# Patient Record
Sex: FEMALE | Race: WHITE | Employment: OTHER | URBAN - METROPOLITAN AREA
[De-identification: names, ages, dates, MRNs, and addresses within clinical notes are randomized per-mention and may not be internally consistent; named-entity substitution may affect disease eponyms.]

---

## 2017-03-28 ENCOUNTER — HOSPITAL ENCOUNTER (OUTPATIENT)
Dept: MAMMOGRAPHY | Age: 72
Discharge: HOME | End: 2017-03-28
Attending: FAMILY MEDICINE
Payer: MEDICARE

## 2017-03-28 DIAGNOSIS — Z12.31 VISIT FOR SCREENING MAMMOGRAM: ICD-10-CM

## 2017-03-28 PROCEDURE — 77067 SCR MAMMO BI INCL CAD: CPT

## 2017-08-24 ENCOUNTER — APPOINTMENT (RX ONLY)
Dept: URBAN - METROPOLITAN AREA CLINIC 349 | Facility: CLINIC | Age: 72
Setting detail: DERMATOLOGY
End: 2017-08-24

## 2017-08-24 DIAGNOSIS — Z85.828 PERSONAL HISTORY OF OTHER MALIGNANT NEOPLASM OF SKIN: ICD-10-CM

## 2017-08-24 DIAGNOSIS — D22 MELANOCYTIC NEVI: ICD-10-CM

## 2017-08-24 DIAGNOSIS — L21.8 OTHER SEBORRHEIC DERMATITIS: ICD-10-CM | Status: INADEQUATELY CONTROLLED

## 2017-08-24 DIAGNOSIS — L82.1 OTHER SEBORRHEIC KERATOSIS: ICD-10-CM

## 2017-08-24 PROBLEM — I10 ESSENTIAL (PRIMARY) HYPERTENSION: Status: ACTIVE | Noted: 2017-08-24

## 2017-08-24 PROBLEM — D22.5 MELANOCYTIC NEVI OF TRUNK: Status: ACTIVE | Noted: 2017-08-24

## 2017-08-24 PROBLEM — L20.84 INTRINSIC (ALLERGIC) ECZEMA: Status: ACTIVE | Noted: 2017-08-24

## 2017-08-24 PROCEDURE — ? COUNSELING

## 2017-08-24 PROCEDURE — ? PRESCRIPTION

## 2017-08-24 PROCEDURE — 99213 OFFICE O/P EST LOW 20 MIN: CPT

## 2017-08-24 RX ORDER — CLOBETASOL PROPIONATE 0.46 MG/ML
SOLUTION TOPICAL
Qty: 1 | Refills: 4 | Status: ERX | COMMUNITY
Start: 2017-08-24

## 2017-08-24 RX ADMIN — CLOBETASOL PROPIONATE: 0.46 SOLUTION TOPICAL at 14:13

## 2017-08-24 ASSESSMENT — LOCATION DETAILED DESCRIPTION DERM
LOCATION DETAILED: LEFT PROXIMAL RADIAL DORSAL FOREARM
LOCATION DETAILED: SUPERIOR THORACIC SPINE
LOCATION DETAILED: LEFT DISTAL CALF
LOCATION DETAILED: RIGHT INFERIOR MEDIAL UPPER BACK
LOCATION DETAILED: RIGHT PROXIMAL DORSAL FOREARM
LOCATION DETAILED: INFERIOR THORACIC SPINE
LOCATION DETAILED: STERNAL NOTCH
LOCATION DETAILED: RIGHT MEDIAL BREAST 3-4:00 REGION
LOCATION DETAILED: RIGHT PROXIMAL CALF
LOCATION DETAILED: LEFT SUPERIOR PARIETAL SCALP
LOCATION DETAILED: MIDDLE STERNUM

## 2017-08-24 ASSESSMENT — LOCATION SIMPLE DESCRIPTION DERM
LOCATION SIMPLE: RIGHT BREAST
LOCATION SIMPLE: RIGHT CALF
LOCATION SIMPLE: UPPER BACK
LOCATION SIMPLE: SCALP
LOCATION SIMPLE: RIGHT FOREARM
LOCATION SIMPLE: RIGHT UPPER BACK
LOCATION SIMPLE: LEFT FOREARM
LOCATION SIMPLE: CHEST
LOCATION SIMPLE: LEFT CALF

## 2017-08-24 ASSESSMENT — LOCATION ZONE DERM
LOCATION ZONE: TRUNK
LOCATION ZONE: LEG
LOCATION ZONE: ARM
LOCATION ZONE: SCALP

## 2018-04-16 PROBLEM — E66.01 OBESITY, MORBID (HCC): Status: ACTIVE | Noted: 2018-04-16

## 2018-05-03 ENCOUNTER — HOSPITAL ENCOUNTER (OUTPATIENT)
Dept: MAMMOGRAPHY | Age: 73
Discharge: HOME | End: 2018-05-03
Attending: FAMILY MEDICINE
Payer: MEDICARE

## 2018-05-03 DIAGNOSIS — Z12.31 VISIT FOR SCREENING MAMMOGRAM: ICD-10-CM

## 2018-05-03 PROCEDURE — 77063 BREAST TOMOSYNTHESIS BI: CPT

## 2019-02-19 ENCOUNTER — HOSPITAL ENCOUNTER (OUTPATIENT)
Dept: SURGERY | Age: 74
Discharge: HOME | End: 2019-02-19
Payer: MEDICARE

## 2019-02-19 ENCOUNTER — HOSPITAL ENCOUNTER (OUTPATIENT)
Dept: PHYSICAL THERAPY | Age: 74
Discharge: HOME | End: 2019-02-19
Payer: MEDICARE

## 2019-02-19 VITALS — OXYGEN SATURATION: 97 %

## 2019-02-19 DIAGNOSIS — R06.83 SNORING: Primary | ICD-10-CM

## 2019-02-19 LAB
ANION GAP SERPL CALC-SCNC: 6 MMOL/L
APPEARANCE UR: CLEAR
APTT PPP: 26.7 SEC (ref 24.7–39.8)
ATRIAL RATE: 64 BPM
BACTERIA SPEC CULT: NORMAL
BACTERIA URNS QL MICRO: 0 /HPF
BASOPHILS # BLD: 0.1 K/UL (ref 0–0.2)
BASOPHILS NFR BLD: 1 % (ref 0–2)
BILIRUB UR QL: ABNORMAL
BUN SERPL-MCNC: 22 MG/DL (ref 8–23)
CALCIUM SERPL-MCNC: 9.3 MG/DL (ref 8.3–10.4)
CALCULATED P AXIS, ECG09: 32 DEGREES
CALCULATED R AXIS, ECG10: 2 DEGREES
CALCULATED T AXIS, ECG11: 23 DEGREES
CASTS URNS QL MICRO: ABNORMAL /LPF
CHLORIDE SERPL-SCNC: 104 MMOL/L (ref 98–107)
CO2 SERPL-SCNC: 33 MMOL/L (ref 21–32)
COLOR UR: YELLOW
CREAT SERPL-MCNC: 1.18 MG/DL (ref 0.6–1)
DIAGNOSIS, 93000: NORMAL
DIFFERENTIAL METHOD BLD: NORMAL
EOSINOPHIL # BLD: 0.2 K/UL (ref 0–0.8)
EOSINOPHIL NFR BLD: 2 % (ref 0.5–7.8)
EPI CELLS #/AREA URNS HPF: ABNORMAL /HPF
ERYTHROCYTE [DISTWIDTH] IN BLOOD BY AUTOMATED COUNT: 13.5 % (ref 11.9–14.6)
GLUCOSE SERPL-MCNC: 119 MG/DL (ref 65–100)
GLUCOSE UR STRIP.AUTO-MCNC: NEGATIVE MG/DL
HCT VFR BLD AUTO: 41.6 % (ref 35.8–46.3)
HGB BLD-MCNC: 13.5 G/DL (ref 11.7–15.4)
HGB UR QL STRIP: NEGATIVE
IMM GRANULOCYTES # BLD AUTO: 0 K/UL (ref 0–0.5)
IMM GRANULOCYTES NFR BLD AUTO: 1 % (ref 0–5)
INR PPP: 1
KETONES UR QL STRIP.AUTO: NEGATIVE MG/DL
LEUKOCYTE ESTERASE UR QL STRIP.AUTO: ABNORMAL
LYMPHOCYTES # BLD: 2.1 K/UL (ref 0.5–4.6)
LYMPHOCYTES NFR BLD: 34 % (ref 13–44)
MCH RBC QN AUTO: 29.3 PG (ref 26.1–32.9)
MCHC RBC AUTO-ENTMCNC: 32.5 G/DL (ref 31.4–35)
MCV RBC AUTO: 90.2 FL (ref 79.6–97.8)
MONOCYTES # BLD: 0.6 K/UL (ref 0.1–1.3)
MONOCYTES NFR BLD: 10 % (ref 4–12)
NEUTS SEG # BLD: 3.2 K/UL (ref 1.7–8.2)
NEUTS SEG NFR BLD: 52 % (ref 43–78)
NITRITE UR QL STRIP.AUTO: NEGATIVE
NRBC # BLD: 0 K/UL (ref 0–0.2)
OTHER OBSERVATIONS,UCOM: ABNORMAL
P-R INTERVAL, ECG05: 154 MS
PH UR STRIP: 6.5 [PH] (ref 5–9)
PLATELET # BLD AUTO: 398 K/UL (ref 150–450)
PMV BLD AUTO: 9.6 FL (ref 9.4–12.3)
POTASSIUM SERPL-SCNC: 4.4 MMOL/L (ref 3.5–5.1)
PROT UR STRIP-MCNC: NEGATIVE MG/DL
PROTHROMBIN TIME: 13.3 SEC (ref 11.7–14.5)
Q-T INTERVAL, ECG07: 420 MS
QRS DURATION, ECG06: 84 MS
QTC CALCULATION (BEZET), ECG08: 433 MS
RBC # BLD AUTO: 4.61 M/UL (ref 4.05–5.2)
RBC #/AREA URNS HPF: ABNORMAL /HPF
SERVICE CMNT-IMP: NORMAL
SODIUM SERPL-SCNC: 143 MMOL/L (ref 136–145)
SP GR UR REFRACTOMETRY: 1.02 (ref 1–1.02)
UROBILINOGEN UR QL STRIP.AUTO: 4 EU/DL (ref 0.2–1)
VENTRICULAR RATE, ECG03: 64 BPM
WBC # BLD AUTO: 6.2 K/UL (ref 4.3–11.1)
WBC URNS QL MICRO: ABNORMAL /HPF

## 2019-02-19 PROCEDURE — 87641 MR-STAPH DNA AMP PROBE: CPT

## 2019-02-19 PROCEDURE — 36415 COLL VENOUS BLD VENIPUNCTURE: CPT

## 2019-02-19 PROCEDURE — 85025 COMPLETE CBC W/AUTO DIFF WBC: CPT

## 2019-02-19 PROCEDURE — 80048 BASIC METABOLIC PNL TOTAL CA: CPT

## 2019-02-19 PROCEDURE — 85610 PROTHROMBIN TIME: CPT

## 2019-02-19 PROCEDURE — 81001 URINALYSIS AUTO W/SCOPE: CPT

## 2019-02-19 PROCEDURE — 97161 PT EVAL LOW COMPLEX 20 MIN: CPT

## 2019-02-19 PROCEDURE — 93005 ELECTROCARDIOGRAM TRACING: CPT | Performed by: ANESTHESIOLOGY

## 2019-02-19 PROCEDURE — 85730 THROMBOPLASTIN TIME PARTIAL: CPT

## 2019-02-19 PROCEDURE — 77030027138 HC INCENT SPIROMETER -A

## 2019-02-19 RX ORDER — CHOLECALCIFEROL (VITAMIN D3) 125 MCG
CAPSULE ORAL
COMMUNITY

## 2019-02-19 NOTE — PERIOP NOTES
Recent Results (from the past 12 hour(s)) CBC WITH AUTOMATED DIFF Collection Time: 02/19/19  9:05 AM  
Result Value Ref Range WBC 6.2 4.3 - 11.1 K/uL  
 RBC 4.61 4.05 - 5.2 M/uL  
 HGB 13.5 11.7 - 15.4 g/dL HCT 41.6 35.8 - 46.3 % MCV 90.2 79.6 - 97.8 FL  
 MCH 29.3 26.1 - 32.9 PG  
 MCHC 32.5 31.4 - 35.0 g/dL  
 RDW 13.5 11.9 - 14.6 % PLATELET 354 632 - 218 K/uL MPV 9.6 9.4 - 12.3 FL ABSOLUTE NRBC 0.00 0.0 - 0.2 K/uL  
 DF AUTOMATED NEUTROPHILS 52 43 - 78 % LYMPHOCYTES 34 13 - 44 % MONOCYTES 10 4.0 - 12.0 % EOSINOPHILS 2 0.5 - 7.8 % BASOPHILS 1 0.0 - 2.0 % IMMATURE GRANULOCYTES 1 0.0 - 5.0 %  
 ABS. NEUTROPHILS 3.2 1.7 - 8.2 K/UL  
 ABS. LYMPHOCYTES 2.1 0.5 - 4.6 K/UL  
 ABS. MONOCYTES 0.6 0.1 - 1.3 K/UL  
 ABS. EOSINOPHILS 0.2 0.0 - 0.8 K/UL  
 ABS. BASOPHILS 0.1 0.0 - 0.2 K/UL  
 ABS. IMM. GRANS. 0.0 0.0 - 0.5 K/UL METABOLIC PANEL, BASIC Collection Time: 02/19/19  9:05 AM  
Result Value Ref Range Sodium 143 136 - 145 mmol/L Potassium 4.4 3.5 - 5.1 mmol/L Chloride 104 98 - 107 mmol/L  
 CO2 33 (H) 21 - 32 mmol/L Anion gap 6 mmol/L Glucose 119 (H) 65 - 100 mg/dL BUN 22 8 - 23 MG/DL Creatinine 1.18 (H) 0.6 - 1.0 MG/DL  
 GFR est AA 58 (L) >60 ml/min/1.73m2 GFR est non-AA 48 ml/min/1.73m2 Calcium 9.3 8.3 - 10.4 MG/DL PROTHROMBIN TIME + INR Collection Time: 02/19/19  9:05 AM  
Result Value Ref Range Prothrombin time 13.3 11.7 - 14.5 sec INR 1.0    
PTT Collection Time: 02/19/19  9:05 AM  
Result Value Ref Range aPTT 26.7 24.7 - 39.8 SEC URINALYSIS W/ RFLX MICROSCOPIC Collection Time: 02/19/19  9:05 AM  
Result Value Ref Range Color YELLOW Appearance CLEAR Specific gravity 1.023 1.001 - 1.023    
 pH (UA) 6.5 5.0 - 9.0 Protein NEGATIVE  NEG mg/dL Glucose NEGATIVE  mg/dL Ketone NEGATIVE  NEG mg/dL Bilirubin SMALL (A) NEG Blood NEGATIVE  NEG Urobilinogen 4.0 (H) 0.2 - 1.0 EU/dL Nitrites NEGATIVE  NEG Leukocyte Esterase TRACE (A) NEG    
 WBC 0-3 0 /hpf  
 RBC 0-3 0 /hpf Epithelial cells 0-3 0 /hpf Bacteria 0 0 /hpf Casts 0-3 0 /lpf Other observations RESULTS VERIFIED MANUALLY    
MSSA/MRSA SC BY PCR, NASAL SWAB Collection Time: 02/19/19 10:17 AM  
Result Value Ref Range Special Requests: NASAL Culture result:     
  SA target not detected. A MRSA NEGATIVE, SA NEGATIVE test result does not preclude MRSA or SA nasal colonization. EKG, 12 LEAD, INITIAL Collection Time: 02/19/19 10:44 AM  
Result Value Ref Range Ventricular Rate 64 BPM  
 Atrial Rate 64 BPM  
 P-R Interval 154 ms QRS Duration 84 ms Q-T Interval 420 ms QTC Calculation (Bezet) 433 ms Calculated P Axis 32 degrees Calculated R Axis 2 degrees Calculated T Axis 23 degrees Diagnosis Normal sinus rhythm Normal ECG No previous ECGs available

## 2019-02-19 NOTE — PERIOP NOTES
Recent Results (from the past 12 hour(s)) CBC WITH AUTOMATED DIFF Collection Time: 02/19/19  9:05 AM  
Result Value Ref Range WBC 6.2 4.3 - 11.1 K/uL  
 RBC 4.61 4.05 - 5.2 M/uL  
 HGB 13.5 11.7 - 15.4 g/dL HCT 41.6 35.8 - 46.3 % MCV 90.2 79.6 - 97.8 FL  
 MCH 29.3 26.1 - 32.9 PG  
 MCHC 32.5 31.4 - 35.0 g/dL  
 RDW 13.5 11.9 - 14.6 % PLATELET 163 029 - 218 K/uL MPV 9.6 9.4 - 12.3 FL ABSOLUTE NRBC 0.00 0.0 - 0.2 K/uL  
 DF AUTOMATED NEUTROPHILS 52 43 - 78 % LYMPHOCYTES 34 13 - 44 % MONOCYTES 10 4.0 - 12.0 % EOSINOPHILS 2 0.5 - 7.8 % BASOPHILS 1 0.0 - 2.0 % IMMATURE GRANULOCYTES 1 0.0 - 5.0 %  
 ABS. NEUTROPHILS 3.2 1.7 - 8.2 K/UL  
 ABS. LYMPHOCYTES 2.1 0.5 - 4.6 K/UL  
 ABS. MONOCYTES 0.6 0.1 - 1.3 K/UL  
 ABS. EOSINOPHILS 0.2 0.0 - 0.8 K/UL  
 ABS. BASOPHILS 0.1 0.0 - 0.2 K/UL  
 ABS. IMM. GRANS. 0.0 0.0 - 0.5 K/UL METABOLIC PANEL, BASIC Collection Time: 02/19/19  9:05 AM  
Result Value Ref Range Sodium 143 136 - 145 mmol/L Potassium 4.4 3.5 - 5.1 mmol/L Chloride 104 98 - 107 mmol/L  
 CO2 33 (H) 21 - 32 mmol/L Anion gap 6 mmol/L Glucose 119 (H) 65 - 100 mg/dL BUN 22 8 - 23 MG/DL Creatinine 1.18 (H) 0.6 - 1.0 MG/DL  
 GFR est AA 58 (L) >60 ml/min/1.73m2 GFR est non-AA 48 ml/min/1.73m2 Calcium 9.3 8.3 - 10.4 MG/DL PROTHROMBIN TIME + INR Collection Time: 02/19/19  9:05 AM  
Result Value Ref Range Prothrombin time 13.3 11.7 - 14.5 sec INR 1.0    
PTT Collection Time: 02/19/19  9:05 AM  
Result Value Ref Range aPTT 26.7 24.7 - 39.8 SEC URINALYSIS W/ RFLX MICROSCOPIC Collection Time: 02/19/19  9:05 AM  
Result Value Ref Range Color YELLOW Appearance CLEAR Specific gravity 1.023 1.001 - 1.023    
 pH (UA) 6.5 5.0 - 9.0 Protein NEGATIVE  NEG mg/dL Glucose NEGATIVE  mg/dL Ketone NEGATIVE  NEG mg/dL Bilirubin SMALL (A) NEG Blood NEGATIVE  NEG Urobilinogen 4.0 (H) 0.2 - 1.0 EU/dL Nitrites NEGATIVE  NEG Leukocyte Esterase TRACE (A) NEG    
 WBC 0-3 0 /hpf  
 RBC 0-3 0 /hpf Epithelial cells 0-3 0 /hpf Bacteria 0 0 /hpf Casts 0-3 0 /lpf Other observations RESULTS VERIFIED MANUALLY    
MSSA/MRSA SC BY PCR, NASAL SWAB Collection Time: 02/19/19 10:17 AM  
Result Value Ref Range Special Requests: NASAL Culture result:     
  SA target not detected. A MRSA NEGATIVE, SA NEGATIVE test result does not preclude MRSA or SA nasal colonization. EKG, 12 LEAD, INITIAL Collection Time: 02/19/19 10:44 AM  
Result Value Ref Range Ventricular Rate 64 BPM  
 Atrial Rate 64 BPM  
 P-R Interval 154 ms QRS Duration 84 ms Q-T Interval 420 ms QTC Calculation (Bezet) 433 ms Calculated P Axis 32 degrees Calculated R Axis 2 degrees Calculated T Axis 23 degrees Diagnosis Normal sinus rhythm Normal ECG No previous ECGs available

## 2019-02-19 NOTE — PROGRESS NOTES
Jared Zepeda : (19 y.o.) [facility] at [address] [address]. [phone] Physical Therapy Prehab Plan of Treatment and Evaluation Summary:2019 ICD-10: Treatment Diagnosis:  
· Pain in Right Knee (M25.561) · Difficulty in walking, Not elsewhere classified (R26.2) Precautions/Allergies:  
Patient has no known allergies. MEDICAL/REFERRING DIAGNOSIS: 
Unilateral primary osteoarthritis, right knee [M17.11] REFERRING PHYSICIAN: Cornelius Wilkes., * DATE OF SURGERY: 3/6/19 Assessment:  
Comments:  Patient presents prior to R TKA. She denies any issues with her L LE. Patient plans on going home at d/c with assist from her spouse. PROBLEM LIST (Impacting functional limitations): Ms. Jordyn Acuna presents with the following right lower extremity(s) problems: 
1. Gait 2. Strength 3. Home Exercise Program 
4. Pain INTERVENTIONS PLANNED: 
1. Home Exercise Program 
2. Educational Discussion TREATMENT PLAN: Effective Dates: 2019 TO 2019. Frequency/Duration: Patient to continue to perform home exercise program at least twice per day up until her surgery. GOALS: (Goals have been discussed and agreed upon with patient.) Discharge Goals: Time Frame: 1 Day 1. Patient will demonstrate independence with a home exercise program designed to increase strength, range of motion, balance, coordination and pain control to minimize functional deficits and optimize patient for total joint replacement. Rehabilitation Potential For Stated Goals: Good Regarding Kat Malone's therapy, I certify that the treatment plan above will be carried out by a therapist or under their direction. Thank you for this referral, 
Avtar Erwin, PT     
    
 
 
HISTORY:  
Present Symptoms: 
Pain Intensity 1: 2 Pain Location 1: Knee Pain Orientation 1: Right History of Present Injury/Illness (Reason for Referral): 
 Medical/Referring Diagnosis: Unilateral primary osteoarthritis, right knee [M17.11] Past Medical History/Comorbidities: Ms. Taj Resendiz  has a past medical history of Depression, Diabetes mellitus ([address] Hypercholesterolemia, Hypertension, Personal history of colonic polyps, and Vitamin D deficiency. Ms. Taj Resendiz  has a past surgical history that includes hx ryan and bso; hx cyst removal; hx lap cholecystectomy; hx  section; hx colonoscopy (2012); and hx breast biopsy (Left). Social History/Living Environment:  
Home Environment: Private residence # Steps to Enter: 4 Rails to Enter: No 
One/Two Story Residence: One story Living Alone: No 
Support Systems: Spouse/Significant Other/Partner Patient Expects to be Discharged to[de-identified] Private residence Current DME Used/Available at Home: Cane, straight, Raised toilet seat, Shower chair, Walker, rollator Tub or Shower Type: Shower Work/Activity: 
Patient is independent; retired Dominant Side: 
RIGHT Current Medications:  See Pre-assessment nursing note Number of Personal Factors/Comorbidities that affect the Plan of Care: 0: LOW COMPLEXITY EXAMINATION:  
ADLs (Current Functional Status):  
Ambulation: 
[x] Independent 
[] Walk Indoors Only 
[] Walk Outdoors [] Use Assistive Device [] Use Wheelchair Only Dressing: 
[x] Independent Requires Assistance from Someone for: 
[] Sock/Shoes 
[] Pants 
[] Everything Bathing/Showering:  
[x] Independent 
[] Requires Assistance from Someone 
[] Sponge Bath Only Household Activities: 
[] Routine house and yard work [x] Light Housework Only 
[] None Observation/Orthostatic Postural Assessment:  
   
ROM/Flexibility:  
Gross Assessment: Yes AROM: Within functional limits(L LE) RLE AROM 
R Knee Flexion: 123 R Knee Extension: 0 Strength:  
Gross Assessment: Yes 
Strength: Within functional limits(L LE 5/5) RLE Strength 
R Hip Flexion: 4 
R Knee Flexion: 4 
R Knee Extension: 4 Functional Mobility:   
Gross Assessment: Yes Coordination: Within functional limits Gait Description (WDL): Exceptions to [facility] Stand to Sit: Independent Sit to Stand: Independent Distance (ft): 500 Feet (ft) Ambulation - Level of Assistance: Independent Gait Abnormalities: Antalgic Balance:   
Sitting: Intact Standing: Intact Body Structures Involved: 1. Joints 2. Muscles Body Functions Affected: 1. Movement Related Activities and Participation Affected: 1. Mobility Number of elements that affect the Plan of Care: 4+: HIGH COMPLEXITY CLINICAL PRESENTATION:  
Presentation: Stable and uncomplicated: LOW COMPLEXITY CLINICAL DECISION MAKING:  
Outcome Measure: Tool Used: Lower Extremity Functional Scale (LEFS) Score:  Initial: 47/80 Most Recent: X/80 (Date: -- ) Interpretation of Score: 20 questions each scored on a 5 point scale with 0 representing \"extreme difficulty or unable to perform\" and 4 representing \"no difficulty\". The lower the score, the greater the functional disability. 80/80 represents no disability. Minimal detectable change is 9 points. Medical Necessity:  
· Ms. Danii Sam is expected to optimize her lower extremity strength and ROM in preparation for joint replacement surgery. Reason for Services/Other Comments: · Achieve baseline assesment of musculoskeletal system, functional mobility and home environment. , educate in PT HEP in preparation for surgery, educate in hospital plan of care. Use of outcome tool(s) and clinical judgement create a POC that gives a: Clear prediction of patient's progress: LOW COMPLEXITY  
TREATMENT:  
Treatment/Session Assessment:  Patient was instructed in PT- HEP to increase strength and ROM in LEs. Answered all questions. · Post session pain:  2/10 · Compliance with Program/Exercises: Will assess as treatment progresses. Total Treatment Duration: PT Patient Time In/Time Out Time In: 0845 Time Out: 0900 Logan Race, PT

## 2019-02-19 NOTE — PERIOP NOTES
Patient verified name and . Order for consent  found in EHR and matches case posting; patient verified. Type 3 surgery, Joint assessment complete. Labs per surgeon: cbc,bmp,pt,ptt ; results within limits. Urinalysis not yet resulted. MSSA pending. Labs per anesthesia protocol: no other needed. EKG today-NSR, normal ekg. MRSA/MSSA swab collected; pharmacy to review and dose antibiotic as appropriate. Hibiclens and instructions to return bottle on DOS given per hospital policy. Patient provided with handouts including Guide to Surgery, Pain Management, Hand Hygiene, Blood Transfusion Education, and [facility] [facility] Brochure. Patient answered medical/surgical history questions at their best of ability. All prior to admission medications documented in [facility]. Original medication prescription bottle not visualized during patient appointment. Patient instructed to hold all vitamins 7 days prior to surgery and NSAIDS 5 days prior to surgery. Prescription medications to hold: decrease aspirin 325 to 81 mg starting 5 days prior to DOS. Patient instructed to continue previous medications as prescribed prior to surgery and to take the following medications the day of surgery according to anesthesia guidelines with a small sip of water: none. Rachael Loose Patient teach back successful and patient demonstrates knowledge of instruction.

## 2019-02-20 PROBLEM — R06.83 SNORING: Status: ACTIVE | Noted: 2019-02-20

## 2019-02-20 NOTE — ADVANCED PRACTICE NURSE
Total Joint Surgery Preoperative Chart Review Patient ID: 
Hiwot Valentin [id number] 
49 y.o. 
1945 Surgeon: Dr. Olga Calle Date of Surgery: 3/6/2019 Procedure: Total Right Knee Arthroplasty Primary Care Physician: Erroll Schlatter, [phone] Specialty Physician(s):   
 
Subjective:  
Hiwot Valentin is a 72 y.o. WHITE OR  female who presents for preoperative evaluation for Total Right Knee arthroplasty. This is a preoperative chart review note based on data collected by the nurse at the surgical Pre-Assessment visit. Past Medical History:  
Diagnosis Date  Anxiety  Arthritis  Depression  Diabetes mellitus ([facility] type 2 per PCP note, oral med only;  pt denies, reports does not check BS  
 Hypercholesterolemia  Hypertension  Morbid obesity ([facility]  Personal history of colonic polyps  Vitamin D deficiency Past Surgical History:  
Procedure Laterality Date  HX BREAST BIOPSY Left   
 ?  (Stereo Bx - negative per pt)  HX  SECTION    
 HX COLONOSCOPY  2012  
 polyp removed, given 3-4 year recall  HX CYST REMOVAL    
 neck  HX LAP CHOLECYSTECTOMY  HX YINA AND BSO Family History Problem Relation Age of Onset  Heart Disease Father  Hypertension Father  Breast Cancer Neg Hx Social History Tobacco Use  Smoking status: Never Smoker  Smokeless tobacco: Never Used Substance Use Topics  Alcohol use: No  
   
Prior to Admission medications Medication Sig Start Date End Date Taking? Authorizing Provider  
cholecalciferol, vitamin D3, (VITAMIN D3) 2,000 unit tab Take  by mouth nightly. Yes Provider, Historical  
losartan (COZAAR) 100 mg tablet Take 1 Tab by mouth daily. Patient taking differently: Take 100 mg by mouth nightly. 18   Erroll Schlatter, MD  
atorvastatin (LIPITOR) 10 mg tablet Take 1 Tab by mouth daily. Patient taking differently: Take 10 mg by mouth nightly. 4/16/18   Loretta Allan MD  
fenofibrate (LOFIBRA) 160 mg tablet Take 1 Tab by mouth daily. Patient taking differently: Take 160 mg by mouth nightly. 4/16/18   Loretta Allan MD  
metFORMIN (GLUCOPHAGE) 500 mg tablet Take 1 Tab by mouth daily (with breakfast). Patient taking differently: Take 500 mg by mouth nightly. 4/16/18   Loretta Allan MD  
citalopram (CELEXA) 20 mg tablet Take 1 Tab by mouth daily. Patient taking differently: Take 20 mg by mouth every evening. 4/16/18   Loretta Allan MD  
hydroCHLOROthiazide (HYDRODIURIL) 25 mg tablet Take 1 Tab by mouth daily. Patient taking differently: Take 25 mg by mouth nightly. 4/16/18   Loretta Allan MD  
calcium 500 mg tab Take  by mouth nightly. Provider, Historical  
aspirin (ASPIRIN) 325 mg tablet Take 325 mg by mouth nightly. Provider, Historical  
 
No Known Allergies Objective:  
 
Physical Exam:  
BP (P) 119/64 (BP 1 Location: Left arm, BP Patient Position: At rest) Pulse (P) 65 Temp (P) 96.4 °F (35.8 °C) Resp (P) 16 Ht (P) 5' (1.524 m) Wt (P) 93.7 kg (206 lb 8 oz) SpO2 (P) 97% BMI (P) 40.33 kg/m²  
  
Neck circumference:  37     cm ECG:   
EKG Results Procedure 720 Value Units Date/Time EKG, 12 LEAD, INITIAL [[id number]] Collected:  02/19/19 1044 Order Status:  Completed Updated:  02/19/19 1709 Ventricular Rate 64 BPM   
  Atrial Rate 64 BPM   
  P-R Interval 154 ms QRS Duration 84 ms Q-T Interval 420 ms QTC Calculation (Bezet) 433 ms Calculated P Axis 32 degrees Calculated R Axis 2 degrees Calculated T Axis 23 degrees Diagnosis --  
  Normal sinus rhythm Normal ECG No previous ECGs available Confirmed by Papo Montilla MD (), [id number] ([id number]) on 2/19/2019 5:08:54 PM 
  
  
 
 
Data Review:  
Labs:  
Results for Makros Cantrell (MRN [id number]) as of 2/20/2019 12:48 Ref.  Range 2/19/2019 09:05  
 Sodium Latest Ref Range: 136 - 145 mmol/L 143 Potassium Latest Ref Range: 3.5 - 5.1 mmol/L 4.4 Chloride Latest Ref Range: 98 - 107 mmol/L 104 CO2 Latest Ref Range: 21 - 32 mmol/L 33 (H) Anion gap Latest Units: mmol/L 6 Glucose Latest Ref Range: 65 - 100 mg/dL 119 (H) BUN Latest Ref Range: 8 - 23 MG/DL 22 Creatinine Latest Ref Range: 0.6 - 1.0 MG/DL 1.18 (H) Calcium Latest Ref Range: 8.3 - 10.4 MG/DL 9.3 GFR est non-AA Latest Units: ml/min/1.73m2 48 GFR est AA Latest Ref Range: >60 ml/min/1.73m2 58 (L) Results for Brandee Silva (MRN [id number]) as of 2/20/2019 12:48 Ref. Range 2/22/2018 10:08 Hemoglobin A1c, (calculated) Latest Ref Range: 4.8 - 5.6 % 5.8 (H) Problem List: 
) Patient Active Problem List  
Diagnosis Code  Essential hypertension with goal blood pressure less than 130/85 I10  Type 2 diabetes mellitus without complication (HCC) D71.0  Pure hypercholesterolemia E78.00  Pure hyperglyceridemia E78.1  Vitamin D deficiency E55.9  Depression F32.9  Obesity, morbid ([address] E66.01  
 Snoring R06.83 Total Joint Surgery Pre-Assessment Recommendations:   
Patient reports the symptoms of snoring, observed apnea and /or excessive daytime sleepiness. Will refer patient for HST based on above assessment. Recommend continuous saturation monitoring hours of sleep, during hospitalization. Signed By: TROY Ceron February 20, 2019

## 2019-03-05 ENCOUNTER — ANESTHESIA EVENT (OUTPATIENT)
Dept: SURGERY | Age: 74
DRG: 470 | End: 2019-03-05
Payer: MEDICARE

## 2019-03-05 NOTE — H&P
[facility]  Pre Operative History and Physical Exam    Patient ID:  Benson Guevara  [id number]  36 y.o.  1945    Today: 2019       Assessment:   1. Arthritis of the right knee        Plan:    1. Proceed with scheduled Procedure(s) (LRB):  KNEE ARTHROPLASTY TOTAL/ RIGHT/ YOVANI/ FNB (Right)            CC:  Right knee pain    HPI:   The patient has end stage arthritis of the right knee. The patient was evaluated and examined during a consultation prior to this office visit. There have been no changes to the patient's orthopedic condition since the initial consultation. The patient has failed previous conservative treatment for this condition including antiinflammatories , and lifestyle modifications. The necessity for joint replacement is present.  The patient will be admitted the day of surgery for Procedure(s) (LRB):  KNEE ARTHROPLASTY TOTAL/ RIGHT/ YOVANI/ FNB (Right)      Past Medical/Surgical History:  Past Medical History:   Diagnosis Date    Anxiety     Arthritis     Depression     Diabetes mellitus ([facility] [address]     type 2 per PCP note, oral med only;  pt denies, reports does not check BS    Hypercholesterolemia     Hypertension     Morbid obesity ([facility] [address]     Personal history of colonic polyps     Vitamin D deficiency      Past Surgical History:   Procedure Laterality Date    HX BREAST BIOPSY Left     ?  (Stereo Bx - negative per pt)    HX  SECTION      HX COLONOSCOPY  2012    polyp removed, given 3-4 year recall    HX CYST REMOVAL      neck    HX LAP CHOLECYSTECTOMY      HX YINA AND BSO          Allergies: No Known Allergies     Physical Exam:   General: NAD, Alert, Oriented, Appears their stated age     [de-identified]: NC/AT, PERRL    Skin: No rashes, lesions or wounds seen      Psych: normal affect      Heart: Regular Rate, Rhythm     Lungs: unlabored respirations, normal breath sounds     Abdomen: Soft and non-distended     Ortho: Pain with limited ROM of the right knee    Neuro: no focal defects, sensation is equal bilaterally     Lymph: no lymphadenopathy     Meds:   No current facility-administered medications for this encounter. Current Outpatient Medications   Medication Sig    cholecalciferol, vitamin D3, (VITAMIN D3) 2,000 unit tab Take  by mouth nightly.  losartan (COZAAR) 100 mg tablet Take 1 Tab by mouth daily. (Patient taking differently: Take 100 mg by mouth nightly.)    atorvastatin (LIPITOR) 10 mg tablet Take 1 Tab by mouth daily. (Patient taking differently: Take 10 mg by mouth nightly.)    fenofibrate (LOFIBRA) 160 mg tablet Take 1 Tab by mouth daily. (Patient taking differently: Take 160 mg by mouth nightly.)    metFORMIN (GLUCOPHAGE) 500 mg tablet Take 1 Tab by mouth daily (with breakfast). (Patient taking differently: Take 500 mg by mouth nightly.)    citalopram (CELEXA) 20 mg tablet Take 1 Tab by mouth daily. (Patient taking differently: Take 20 mg by mouth every evening.)    hydroCHLOROthiazide (HYDRODIURIL) 25 mg tablet Take 1 Tab by mouth daily. (Patient taking differently: Take 25 mg by mouth nightly.)    calcium 500 mg tab Take  by mouth nightly.  aspirin (ASPIRIN) 325 mg tablet Take 325 mg by mouth nightly. Labs:  Office Visit on 02/28/2019   Component Date Value Ref Range Status    WBC (POC) 02/28/2019 7.2  4.1 - 10.9 10^3/ul Final    ABS. LYMPHS (POC) 02/28/2019 2.8  0.6 - 4.1 10^3/ul Final    Mid # (POC) 02/28/2019 0.8  0.0 - 1.8 10^3/ul Final    ABS. GRANS (POC) 02/28/2019 3.6  2.0 - 7.8 10^3/ul Final    LYMPHOCYTES (POC) 02/28/2019 38.7  10.0 - 58.5 % Final    MID% POC 02/28/2019 11.6  0.1 - 24.0 % Final    GRANULOCYTES (POC) 02/28/2019 49.7  37.0 - 92.0 % Final    RBC (POC) 02/28/2019 4.20  4. 20 - 6.30 10^6/ul Final    HGB (POC) 02/28/2019 12.5  12.0 - 18.0 g/dL Final    HCT (POC) 02/28/2019 38.1  37.0 - 51.0 % Final    MCV (POC) 02/28/2019 90.9  80.0 - 97.0 fL Final    MCH (POC) 02/28/2019 29.8 26.0 - 32.0 pg Final    MCHC (POC) 02/28/2019 32.8  31.0 - 36.0 g/dL Final    RDW (POC) 02/28/2019 13.1  11.5 - 14.5 % Final    PLATELET (POC) 24/15/8806 342  140 - 440 10^3/ul Final    MPV (POC) 02/28/2019 6.8  0.0 - 49.9 fL Final    Glucose 02/28/2019 105* 65 - 99 mg/dL Final    BUN 02/28/2019 26  8 - 27 mg/dL Final    Creatinine 02/28/2019 1.08* 0.57 - 1.00 mg/dL Final    GFR est non-AA 02/28/2019 52* >59 mL/min/1.73 Final    GFR est AA 02/28/2019 60  >59 mL/min/1.73 Final    BUN/Creatinine ratio 02/28/2019 24  12 - 28 Final    Sodium 02/28/2019 144  134 - 144 mmol/L Final    Potassium 02/28/2019 5.1  3.5 - 5.2 mmol/L Final    Chloride 02/28/2019 103  96 - 106 mmol/L Final    CO2 02/28/2019 25  20 - 29 mmol/L Final    Calcium 02/28/2019 9.6  8.7 - 10.3 mg/dL Final    Protein, total 02/28/2019 6.7  6.0 - 8.5 g/dL Final    Albumin 02/28/2019 4.1  3.5 - 4.8 g/dL Final    GLOBULIN, TOTAL 02/28/2019 2.6  1.5 - 4.5 g/dL Final    A-G Ratio 02/28/2019 1.6  1.2 - 2.2 Final    Bilirubin, total 02/28/2019 0.4  0.0 - 1.2 mg/dL Final    Alk.  phosphatase 02/28/2019 81  39 - 117 IU/L Final    AST (SGOT) 02/28/2019 24  0 - 40 IU/L Final    ALT (SGPT) 02/28/2019 14  0 - 32 IU/L Final    Hemoglobin A1c 02/28/2019 5.8* 4.8 - 5.6 % Final    Comment:          Prediabetes: 5.7 - 6.4           Diabetes: >6.4           Glycemic control for adults with diabetes: <7.0      Estimated average glucose 02/28/2019 120  mg/dL Final    Cholesterol, total 02/28/2019 143  100 - 199 mg/dL Final    Triglyceride 02/28/2019 191* 0 - 149 mg/dL Final    HDL Cholesterol 02/28/2019 32* >39 mg/dL Final    VLDL, calculated 02/28/2019 38  5 - 40 mg/dL Final    LDL, calculated 02/28/2019 73  0 - 99 mg/dL Final    TSH 02/28/2019 0.968  0.450 - 4.500 uIU/mL Final    Color (UA POC) 02/28/2019 Yellow   Final    Clarity (UA POC) 02/28/2019 Clear   Final    Glucose (UA POC) 02/28/2019 Negative  Negative Final    Bilirubin (UA POC) 02/28/2019 Negative  Negative Final    Ketones (UA POC) 02/28/2019 Negative  Negative Final    Specific gravity (UA POC) 02/28/2019 1.020  1.001 - 1.035 Final    Blood (UA POC) 02/28/2019 Negative  Negative Final    pH (UA POC) 02/28/2019 7.0  4.6 - 8.0 Final    Protein (UA POC) 02/28/2019 Negative  Negative Final    Urobilinogen (UA POC) 02/28/2019 normal  0.2 - 1 Final    Nitrites (UA POC) 02/28/2019 Negative  Negative Final    Leukocyte esterase (UA POC) 02/28/2019 Negative  Negative Final   Hospital Outpatient Visit on 02/19/2019   Component Date Value Ref Range Status    WBC 02/19/2019 6.2  4.3 - 11.1 K/uL Final    RBC 02/19/2019 4.61  4.05 - 5.2 M/uL Final    HGB 02/19/2019 13.5  11.7 - 15.4 g/dL Final    HCT 02/19/2019 41.6  35.8 - 46.3 % Final    MCV 02/19/2019 90.2  79.6 - 97.8 FL Final    MCH 02/19/2019 29.3  26.1 - 32.9 PG Final    MCHC 02/19/2019 32.5  31.4 - 35.0 g/dL Final    RDW 02/19/2019 13.5  11.9 - 14.6 % Final    PLATELET 04/96/0954 041  150 - 450 K/uL Final    MPV 02/19/2019 9.6  9.4 - 12.3 FL Final    ABSOLUTE NRBC 02/19/2019 0.00  0.0 - 0.2 K/uL Final    **Note: Absolute NRBC parameter is now reported with Hemogram**    DF 02/19/2019 AUTOMATED    Final    NEUTROPHILS 02/19/2019 52  43 - 78 % Final    LYMPHOCYTES 02/19/2019 34  13 - 44 % Final    MONOCYTES 02/19/2019 10  4.0 - 12.0 % Final    EOSINOPHILS 02/19/2019 2  0.5 - 7.8 % Final    BASOPHILS 02/19/2019 1  0.0 - 2.0 % Final    IMMATURE GRANULOCYTES 02/19/2019 1  0.0 - 5.0 % Final    ABS. NEUTROPHILS 02/19/2019 3.2  1.7 - 8.2 K/UL Final    ABS. LYMPHOCYTES 02/19/2019 2.1  0.5 - 4.6 K/UL Final    ABS. MONOCYTES 02/19/2019 0.6  0.1 - 1.3 K/UL Final    ABS. EOSINOPHILS 02/19/2019 0.2  0.0 - 0.8 K/UL Final    ABS. BASOPHILS 02/19/2019 0.1  0.0 - 0.2 K/UL Final    ABS. IMM.  GRANS. 02/19/2019 0.0  0.0 - 0.5 K/UL Final    Sodium 02/19/2019 143  136 - 145 mmol/L Final    Potassium 02/19/2019 4.4  3.5 - 5.1 mmol/L Final    Chloride 02/19/2019 104  98 - 107 mmol/L Final    CO2 02/19/2019 33* 21 - 32 mmol/L Final    Anion gap 02/19/2019 6  mmol/L Final    Glucose 02/19/2019 119* 65 - 100 mg/dL Final    Comment: 47 - 60 mg/dl Consistent with, but not fully diagnostic of hypoglycemia. 101 - 125 mg/dl Impaired fasting glucose/consistent with pre-diabetes mellitus  > 126 mg/dl Fasting glucose consistent with overt diabetes mellitus      BUN 02/19/2019 22  8 - 23 MG/DL Final    Creatinine 02/19/2019 1.18* 0.6 - 1.0 MG/DL Final    GFR est AA 02/19/2019 58* >60 ml/min/1.73m2 Final    GFR est non-AA 02/19/2019 48  ml/min/1.73m2 Final    Comment: (NOTE)  Estimated GFR is calculated using the Modification of Diet in Renal   Disease (MDRD) Study equation, reported for both  Americans   (GFRAA) and non- Americans (GFRNA), and normalized to 1.73m2   body surface area. The physician must decide which value applies to   the patient. The MDRD study equation should only be used in   individuals age 25 or older. It has not been validated for the   following: pregnant women, patients with serious comorbid conditions,   or on certain medications, or persons with extremes of body size,   muscle mass, or nutritional status.  Calcium 02/19/2019 9.3  8.3 - 10.4 MG/DL Final    Special Requests: 02/19/2019 NASAL    Final    Culture result: 02/19/2019 SA target not detected. A MRSA NEGATIVE, SA NEGATIVE test result does not preclude MRSA or SA nasal colonization.     Final    Prothrombin time 02/19/2019 13.3  11.7 - 14.5 sec Final    INR 02/19/2019 1.0    Final    Comment: Suggested therapeutic INR range:  Venous thrombosis and embolus  2.0-3.0  Prosthetic heart valve         2.5-3.5  ** Note new reference range and method **      aPTT 02/19/2019 26.7  24.7 - 39.8 SEC Final    Comment: Heparin Therapeutic Range = 74 - 123 seconds  In addition to factor deficiency, monitoring heparin therapy, etc., evaluation of a prolonged aPTT result should include consideration of preanalytic variables such as heparin flush contamination, specimen integrity issues, etc.      Color 02/19/2019 YELLOW    Final    Appearance 02/19/2019 CLEAR    Final    Specific gravity 02/19/2019 1.023  1.001 - 1.023   Final    pH (UA) 02/19/2019 6.5  5.0 - 9.0   Final    Protein 02/19/2019 NEGATIVE   NEG mg/dL Final    Glucose 02/19/2019 NEGATIVE   mg/dL Final    Ketone 02/19/2019 NEGATIVE   NEG mg/dL Final    Bilirubin 02/19/2019 SMALL* NEG   Final    Positive, unable to confirm with Ictotest.    Blood 02/19/2019 NEGATIVE   NEG   Final    Urobilinogen 02/19/2019 4.0* 0.2 - 1.0 EU/dL Final    Nitrites 02/19/2019 NEGATIVE   NEG   Final    Leukocyte Esterase 02/19/2019 TRACE* NEG   Final    WBC 02/19/2019 0-3  0 /hpf Final    RBC 02/19/2019 0-3  0 /hpf Final    Epithelial cells 02/19/2019 0-3  0 /hpf Final    Bacteria 02/19/2019 0  0 /hpf Final    Casts 02/19/2019 0-3  0 /lpf Final    Comment: HYALINE  0-3  GRANULAR      Other observations 02/19/2019 RESULTS VERIFIED MANUALLY    Final    Ventricular Rate 02/19/2019 64  BPM Final    Atrial Rate 02/19/2019 64  BPM Final    P-R Interval 02/19/2019 154  ms Final    QRS Duration 02/19/2019 84  ms Final    Q-T Interval 02/19/2019 420  ms Final    QTC Calculation (Bezet) 02/19/2019 433  ms Final    Calculated P Axis 02/19/2019 32  degrees Final    Calculated R Axis 02/19/2019 2  degrees Final    Calculated T Axis 02/19/2019 23  degrees Final    Diagnosis 02/19/2019    Final                    Value:Normal sinus rhythm  Normal ECG  No previous ECGs available  Confirmed by Leopoldo Crawford MD (), GEOVANI KOLB ([id number]) on 2/19/2019 5:08:54 PM                   Patient Active Problem List   Diagnosis Code    Essential hypertension with goal blood pressure less than 130/85 I10    Type 2 diabetes mellitus without complication (HCC) B13.5    Pure hypercholesterolemia E78.00    Pure hyperglyceridemia E78.1    Vitamin D deficiency E55.9    Depression F32.9    Obesity, morbid (HCC) E66.01    Snoring R06.83         Signed By: CHRISTOPHER Wild  March 5, 2019

## 2019-03-06 ENCOUNTER — ANESTHESIA (OUTPATIENT)
Dept: SURGERY | Age: 74
DRG: 470 | End: 2019-03-06
Payer: MEDICARE

## 2019-03-06 ENCOUNTER — HOSPITAL ENCOUNTER (INPATIENT)
Age: 74
LOS: 2 days | Discharge: HOME HEALTH | DRG: 470 | End: 2019-03-08
Attending: ORTHOPAEDIC SURGERY | Admitting: ORTHOPAEDIC SURGERY
Payer: MEDICARE

## 2019-03-06 ENCOUNTER — HOME HEALTH ADMISSION (OUTPATIENT)
Dept: HOME HEALTH SERVICES | Facility: HOME HEALTH | Age: 74
End: 2019-03-06

## 2019-03-06 DIAGNOSIS — M17.11 PRIMARY OSTEOARTHRITIS OF RIGHT KNEE: ICD-10-CM

## 2019-03-06 DIAGNOSIS — E66.01 OBESITY, MORBID (HCC): ICD-10-CM

## 2019-03-06 DIAGNOSIS — Z96.651 STATUS POST TOTAL KNEE REPLACEMENT, RIGHT: Primary | ICD-10-CM

## 2019-03-06 PROBLEM — M19.90 OSTEOARTHRITIS: Status: ACTIVE | Noted: 2019-03-06

## 2019-03-06 LAB
ABO + RH BLD: NORMAL
BLOOD GROUP ANTIBODIES SERPL: NORMAL
GLUCOSE BLD STRIP.AUTO-MCNC: 119 MG/DL (ref 65–100)
GLUCOSE BLD STRIP.AUTO-MCNC: 296 MG/DL (ref 65–100)
HGB BLD-MCNC: 10.6 G/DL (ref 11.7–15.4)
SPECIMEN EXP DATE BLD: NORMAL

## 2019-03-06 PROCEDURE — 74011250636 HC RX REV CODE- 250/636

## 2019-03-06 PROCEDURE — 77030031139 HC SUT VCRL2 J&J -A: Performed by: ORTHOPAEDIC SURGERY

## 2019-03-06 PROCEDURE — 77030020263 HC SOL INJ SOD CL0.9% LFCR 1000ML

## 2019-03-06 PROCEDURE — 76010010054 HC POST OP PAIN BLOCK: Performed by: ORTHOPAEDIC SURGERY

## 2019-03-06 PROCEDURE — 74011000250 HC RX REV CODE- 250: Performed by: ORTHOPAEDIC SURGERY

## 2019-03-06 PROCEDURE — 74011250637 HC RX REV CODE- 250/637: Performed by: PHYSICIAN ASSISTANT

## 2019-03-06 PROCEDURE — 77030018673: Performed by: ORTHOPAEDIC SURGERY

## 2019-03-06 PROCEDURE — 77030002912 HC SUT ETHBND J&J -A: Performed by: ORTHOPAEDIC SURGERY

## 2019-03-06 PROCEDURE — 85018 HEMOGLOBIN: CPT

## 2019-03-06 PROCEDURE — 76942 ECHO GUIDE FOR BIOPSY: CPT | Performed by: ORTHOPAEDIC SURGERY

## 2019-03-06 PROCEDURE — 77030007880 HC KT SPN EPDRL BBMI -B: Performed by: ANESTHESIOLOGY

## 2019-03-06 PROCEDURE — 77030035643 HC BLD SAW OSC PRECIS STRY -C: Performed by: ORTHOPAEDIC SURGERY

## 2019-03-06 PROCEDURE — 77030006720 HC BLD PAT RMR ZIMM -B: Performed by: ORTHOPAEDIC SURGERY

## 2019-03-06 PROCEDURE — C1776 JOINT DEVICE (IMPLANTABLE): HCPCS | Performed by: ORTHOPAEDIC SURGERY

## 2019-03-06 PROCEDURE — 77030020782 HC GWN BAIR PAWS FLX 3M -B: Performed by: ANESTHESIOLOGY

## 2019-03-06 PROCEDURE — 77030018836 HC SOL IRR NACL ICUM -A: Performed by: ORTHOPAEDIC SURGERY

## 2019-03-06 PROCEDURE — 74011250636 HC RX REV CODE- 250/636: Performed by: ANESTHESIOLOGY

## 2019-03-06 PROCEDURE — 65270000029 HC RM PRIVATE

## 2019-03-06 PROCEDURE — 74011000258 HC RX REV CODE- 258: Performed by: ORTHOPAEDIC SURGERY

## 2019-03-06 PROCEDURE — 77030008467 HC STPLR SKN COVD -B: Performed by: ORTHOPAEDIC SURGERY

## 2019-03-06 PROCEDURE — 77030034849: Performed by: ORTHOPAEDIC SURGERY

## 2019-03-06 PROCEDURE — 94760 N-INVAS EAR/PLS OXIMETRY 1: CPT

## 2019-03-06 PROCEDURE — 77030002966 HC SUT PDS J&J -A: Performed by: ORTHOPAEDIC SURGERY

## 2019-03-06 PROCEDURE — 77030036688 HC BLNKT CLD THER S2SG -B

## 2019-03-06 PROCEDURE — 77030037364 HC TIB INST CR  DISP STRY -C: Performed by: ORTHOPAEDIC SURGERY

## 2019-03-06 PROCEDURE — 77030003602 HC NDL NRV BLK BBMI -B: Performed by: ANESTHESIOLOGY

## 2019-03-06 PROCEDURE — 0SRC0JA REPLACEMENT OF RIGHT KNEE JOINT WITH SYNTHETIC SUBSTITUTE, UNCEMENTED, OPEN APPROACH: ICD-10-PCS | Performed by: ORTHOPAEDIC SURGERY

## 2019-03-06 PROCEDURE — 77030037363 HC FEM INST CR  DISP STRY -C: Performed by: ORTHOPAEDIC SURGERY

## 2019-03-06 PROCEDURE — 77030013708 HC HNDPC SUC IRR PULS STRY –B: Performed by: ORTHOPAEDIC SURGERY

## 2019-03-06 PROCEDURE — 76010000162 HC OR TIME 1.5 TO 2 HR INTENSV-TIER 1: Performed by: ORTHOPAEDIC SURGERY

## 2019-03-06 PROCEDURE — 99221 1ST HOSP IP/OBS SF/LOW 40: CPT | Performed by: PHYSICAL MEDICINE & REHABILITATION

## 2019-03-06 PROCEDURE — 94762 N-INVAS EAR/PLS OXIMTRY CONT: CPT

## 2019-03-06 PROCEDURE — 74011636637 HC RX REV CODE- 636/637: Performed by: HOSPITALIST

## 2019-03-06 PROCEDURE — 86900 BLOOD TYPING SEROLOGIC ABO: CPT

## 2019-03-06 PROCEDURE — 76210000006 HC OR PH I REC 0.5 TO 1 HR: Performed by: ORTHOPAEDIC SURGERY

## 2019-03-06 PROCEDURE — 82962 GLUCOSE BLOOD TEST: CPT

## 2019-03-06 PROCEDURE — 77030003665 HC NDL SPN BBMI -A: Performed by: ANESTHESIOLOGY

## 2019-03-06 PROCEDURE — 74011000250 HC RX REV CODE- 250

## 2019-03-06 PROCEDURE — 77030035236 HC SUT PDS STRATFX BARB J&J -B: Performed by: ORTHOPAEDIC SURGERY

## 2019-03-06 PROCEDURE — 74011250636 HC RX REV CODE- 250/636: Performed by: PHYSICIAN ASSISTANT

## 2019-03-06 PROCEDURE — 36415 COLL VENOUS BLD VENIPUNCTURE: CPT

## 2019-03-06 PROCEDURE — 74011250636 HC RX REV CODE- 250/636: Performed by: ORTHOPAEDIC SURGERY

## 2019-03-06 PROCEDURE — 77030019557 HC ELECTRD VES SEAL MEDT -F: Performed by: ORTHOPAEDIC SURGERY

## 2019-03-06 PROCEDURE — 77030012935 HC DRSG AQUACEL BMS -B: Performed by: ORTHOPAEDIC SURGERY

## 2019-03-06 PROCEDURE — 76060000035 HC ANESTHESIA 2 TO 2.5 HR: Performed by: ORTHOPAEDIC SURGERY

## 2019-03-06 DEVICE — COMPNT FEM CR TRIATHLN 3 R PA --: Type: IMPLANTABLE DEVICE | Site: KNEE | Status: FUNCTIONAL

## 2019-03-06 DEVICE — COMPONENT PAT DIA32MM THK10MM SUPERIOR/INFERIOR KNEE: Type: IMPLANTABLE DEVICE | Site: KNEE | Status: FUNCTIONAL

## 2019-03-06 DEVICE — IMPLANTABLE DEVICE: Type: IMPLANTABLE DEVICE | Site: KNEE | Status: FUNCTIONAL

## 2019-03-06 DEVICE — BASEPLATE TIB SZ 3 AP44MM ML67MM KNEE TRITANIUM 4 CRUCFRM: Type: IMPLANTABLE DEVICE | Site: KNEE | Status: FUNCTIONAL

## 2019-03-06 RX ORDER — SODIUM CHLORIDE, SODIUM LACTATE, POTASSIUM CHLORIDE, CALCIUM CHLORIDE 600; 310; 30; 20 MG/100ML; MG/100ML; MG/100ML; MG/100ML
75 INJECTION, SOLUTION INTRAVENOUS CONTINUOUS
Status: DISCONTINUED | OUTPATIENT
Start: 2019-03-06 | End: 2019-03-06 | Stop reason: HOSPADM

## 2019-03-06 RX ORDER — ROPIVACAINE HYDROCHLORIDE 2 MG/ML
INJECTION, SOLUTION EPIDURAL; INFILTRATION; PERINEURAL AS NEEDED
Status: DISCONTINUED | OUTPATIENT
Start: 2019-03-06 | End: 2019-03-06 | Stop reason: HOSPADM

## 2019-03-06 RX ORDER — NALOXONE HYDROCHLORIDE 0.4 MG/ML
.2-.4 INJECTION, SOLUTION INTRAMUSCULAR; INTRAVENOUS; SUBCUTANEOUS
Status: DISCONTINUED | OUTPATIENT
Start: 2019-03-06 | End: 2019-03-08 | Stop reason: HOSPADM

## 2019-03-06 RX ORDER — TRANEXAMIC ACID 100 MG/ML
INJECTION, SOLUTION INTRAVENOUS AS NEEDED
Status: DISCONTINUED | OUTPATIENT
Start: 2019-03-06 | End: 2019-03-06 | Stop reason: HOSPADM

## 2019-03-06 RX ORDER — ROPIVACAINE HYDROCHLORIDE 5 MG/ML
INJECTION, SOLUTION EPIDURAL; INFILTRATION; PERINEURAL
Status: COMPLETED | OUTPATIENT
Start: 2019-03-06 | End: 2019-03-06

## 2019-03-06 RX ORDER — HYDROMORPHONE HYDROCHLORIDE 2 MG/1
2 TABLET ORAL
Qty: 40 TAB | Refills: 0 | Status: SHIPPED | OUTPATIENT
Start: 2019-03-06 | End: 2019-04-05

## 2019-03-06 RX ORDER — AMOXICILLIN 250 MG
2 CAPSULE ORAL DAILY
Status: DISCONTINUED | OUTPATIENT
Start: 2019-03-07 | End: 2019-03-08 | Stop reason: HOSPADM

## 2019-03-06 RX ORDER — DEXTROSE 40 %
15 GEL (GRAM) ORAL AS NEEDED
Status: DISCONTINUED | OUTPATIENT
Start: 2019-03-06 | End: 2019-03-08 | Stop reason: HOSPADM

## 2019-03-06 RX ORDER — ONDANSETRON 2 MG/ML
4 INJECTION INTRAMUSCULAR; INTRAVENOUS
Status: DISCONTINUED | OUTPATIENT
Start: 2019-03-06 | End: 2019-03-08 | Stop reason: HOSPADM

## 2019-03-06 RX ORDER — HYDROMORPHONE HYDROCHLORIDE 2 MG/1
2 TABLET ORAL
Status: DISCONTINUED | OUTPATIENT
Start: 2019-03-06 | End: 2019-03-08 | Stop reason: HOSPADM

## 2019-03-06 RX ORDER — ACETAMINOPHEN 500 MG
1000 TABLET ORAL EVERY 6 HOURS
Status: DISCONTINUED | OUTPATIENT
Start: 2019-03-07 | End: 2019-03-08 | Stop reason: HOSPADM

## 2019-03-06 RX ORDER — ONDANSETRON 2 MG/ML
INJECTION INTRAMUSCULAR; INTRAVENOUS AS NEEDED
Status: DISCONTINUED | OUTPATIENT
Start: 2019-03-06 | End: 2019-03-06 | Stop reason: HOSPADM

## 2019-03-06 RX ORDER — HYDROMORPHONE HYDROCHLORIDE 2 MG/ML
0.5 INJECTION, SOLUTION INTRAMUSCULAR; INTRAVENOUS; SUBCUTANEOUS
Status: DISCONTINUED | OUTPATIENT
Start: 2019-03-06 | End: 2019-03-06 | Stop reason: HOSPADM

## 2019-03-06 RX ORDER — SODIUM CHLORIDE 0.9 % (FLUSH) 0.9 %
5-40 SYRINGE (ML) INJECTION EVERY 8 HOURS
Status: DISCONTINUED | OUTPATIENT
Start: 2019-03-06 | End: 2019-03-06 | Stop reason: HOSPADM

## 2019-03-06 RX ORDER — CELECOXIB 200 MG/1
200 CAPSULE ORAL EVERY 12 HOURS
Status: DISCONTINUED | OUTPATIENT
Start: 2019-03-06 | End: 2019-03-08 | Stop reason: HOSPADM

## 2019-03-06 RX ORDER — EPHEDRINE SULFATE 50 MG/ML
INJECTION, SOLUTION INTRAVENOUS AS NEEDED
Status: DISCONTINUED | OUTPATIENT
Start: 2019-03-06 | End: 2019-03-06 | Stop reason: HOSPADM

## 2019-03-06 RX ORDER — DEXTROSE 50 % IN WATER (D50W) INTRAVENOUS SYRINGE
25-50 AS NEEDED
Status: DISCONTINUED | OUTPATIENT
Start: 2019-03-06 | End: 2019-03-08 | Stop reason: HOSPADM

## 2019-03-06 RX ORDER — BUPIVACAINE HYDROCHLORIDE 7.5 MG/ML
INJECTION, SOLUTION INTRASPINAL
Status: COMPLETED | OUTPATIENT
Start: 2019-03-06 | End: 2019-03-06

## 2019-03-06 RX ORDER — SODIUM CHLORIDE 0.9 % (FLUSH) 0.9 %
5-40 SYRINGE (ML) INJECTION AS NEEDED
Status: DISCONTINUED | OUTPATIENT
Start: 2019-03-06 | End: 2019-03-08 | Stop reason: HOSPADM

## 2019-03-06 RX ORDER — MIDAZOLAM HYDROCHLORIDE 1 MG/ML
2 INJECTION, SOLUTION INTRAMUSCULAR; INTRAVENOUS ONCE
Status: COMPLETED | OUTPATIENT
Start: 2019-03-06 | End: 2019-03-06

## 2019-03-06 RX ORDER — KETOROLAC TROMETHAMINE 30 MG/ML
INJECTION, SOLUTION INTRAMUSCULAR; INTRAVENOUS AS NEEDED
Status: DISCONTINUED | OUTPATIENT
Start: 2019-03-06 | End: 2019-03-06 | Stop reason: HOSPADM

## 2019-03-06 RX ORDER — DEXAMETHASONE SODIUM PHOSPHATE 4 MG/ML
INJECTION, SOLUTION INTRA-ARTICULAR; INTRALESIONAL; INTRAMUSCULAR; INTRAVENOUS; SOFT TISSUE AS NEEDED
Status: DISCONTINUED | OUTPATIENT
Start: 2019-03-06 | End: 2019-03-06 | Stop reason: HOSPADM

## 2019-03-06 RX ORDER — HYDROCHLOROTHIAZIDE 25 MG/1
25 TABLET ORAL
Status: DISCONTINUED | OUTPATIENT
Start: 2019-03-06 | End: 2019-03-08 | Stop reason: HOSPADM

## 2019-03-06 RX ORDER — OXYCODONE AND ACETAMINOPHEN 10; 325 MG/1; MG/1
1 TABLET ORAL AS NEEDED
Status: DISCONTINUED | OUTPATIENT
Start: 2019-03-06 | End: 2019-03-06 | Stop reason: HOSPADM

## 2019-03-06 RX ORDER — MIDAZOLAM HYDROCHLORIDE 1 MG/ML
INJECTION, SOLUTION INTRAMUSCULAR; INTRAVENOUS AS NEEDED
Status: DISCONTINUED | OUTPATIENT
Start: 2019-03-06 | End: 2019-03-06 | Stop reason: HOSPADM

## 2019-03-06 RX ORDER — LOSARTAN POTASSIUM 50 MG/1
100 TABLET ORAL
Status: DISCONTINUED | OUTPATIENT
Start: 2019-03-06 | End: 2019-03-08 | Stop reason: HOSPADM

## 2019-03-06 RX ORDER — SODIUM CHLORIDE 0.9 % (FLUSH) 0.9 %
5-40 SYRINGE (ML) INJECTION EVERY 8 HOURS
Status: DISCONTINUED | OUTPATIENT
Start: 2019-03-06 | End: 2019-03-08 | Stop reason: HOSPADM

## 2019-03-06 RX ORDER — DIPHENHYDRAMINE HCL 25 MG
25 CAPSULE ORAL
Status: DISCONTINUED | OUTPATIENT
Start: 2019-03-06 | End: 2019-03-08 | Stop reason: HOSPADM

## 2019-03-06 RX ORDER — ENOXAPARIN SODIUM 100 MG/ML
40 INJECTION SUBCUTANEOUS DAILY
Status: DISCONTINUED | OUTPATIENT
Start: 2019-03-07 | End: 2019-03-08 | Stop reason: HOSPADM

## 2019-03-06 RX ORDER — NEOMYCIN AND POLYMYXIN B SULFATES 40; 200000 MG/ML; [USP'U]/ML
SOLUTION IRRIGATION AS NEEDED
Status: DISCONTINUED | OUTPATIENT
Start: 2019-03-06 | End: 2019-03-06 | Stop reason: HOSPADM

## 2019-03-06 RX ORDER — FENOFIBRATE 160 MG/1
160 TABLET ORAL
Status: DISCONTINUED | OUTPATIENT
Start: 2019-03-06 | End: 2019-03-08 | Stop reason: HOSPADM

## 2019-03-06 RX ORDER — OXYCODONE HYDROCHLORIDE 5 MG/1
5 TABLET ORAL
Status: DISCONTINUED | OUTPATIENT
Start: 2019-03-06 | End: 2019-03-06 | Stop reason: HOSPADM

## 2019-03-06 RX ORDER — SODIUM CHLORIDE 9 MG/ML
100 INJECTION, SOLUTION INTRAVENOUS CONTINUOUS
Status: DISPENSED | OUTPATIENT
Start: 2019-03-06 | End: 2019-03-08

## 2019-03-06 RX ORDER — SODIUM CHLORIDE 0.9 % (FLUSH) 0.9 %
5-40 SYRINGE (ML) INJECTION AS NEEDED
Status: DISCONTINUED | OUTPATIENT
Start: 2019-03-06 | End: 2019-03-06 | Stop reason: HOSPADM

## 2019-03-06 RX ORDER — CEFAZOLIN SODIUM/WATER 2 G/20 ML
2 SYRINGE (ML) INTRAVENOUS ONCE
Status: COMPLETED | OUTPATIENT
Start: 2019-03-06 | End: 2019-03-06

## 2019-03-06 RX ORDER — DEXAMETHASONE SODIUM PHOSPHATE 100 MG/10ML
10 INJECTION INTRAMUSCULAR; INTRAVENOUS ONCE
Status: ACTIVE | OUTPATIENT
Start: 2019-03-07 | End: 2019-03-08

## 2019-03-06 RX ORDER — METFORMIN HYDROCHLORIDE 500 MG/1
500 TABLET ORAL
Status: DISCONTINUED | OUTPATIENT
Start: 2019-03-06 | End: 2019-03-08 | Stop reason: HOSPADM

## 2019-03-06 RX ORDER — CEFAZOLIN SODIUM/WATER 2 G/20 ML
2 SYRINGE (ML) INTRAVENOUS EVERY 8 HOURS
Status: COMPLETED | OUTPATIENT
Start: 2019-03-06 | End: 2019-03-07

## 2019-03-06 RX ORDER — PROPOFOL 10 MG/ML
INJECTION, EMULSION INTRAVENOUS
Status: DISCONTINUED | OUTPATIENT
Start: 2019-03-06 | End: 2019-03-06 | Stop reason: HOSPADM

## 2019-03-06 RX ORDER — GUAIFENESIN 100 MG/5ML
81 LIQUID (ML) ORAL DAILY
COMMUNITY
End: 2019-03-08

## 2019-03-06 RX ORDER — FENTANYL CITRATE 50 UG/ML
100 INJECTION, SOLUTION INTRAMUSCULAR; INTRAVENOUS ONCE
Status: COMPLETED | OUTPATIENT
Start: 2019-03-06 | End: 2019-03-06

## 2019-03-06 RX ORDER — ACETAMINOPHEN 10 MG/ML
1000 INJECTION, SOLUTION INTRAVENOUS ONCE
Status: COMPLETED | OUTPATIENT
Start: 2019-03-06 | End: 2019-03-06

## 2019-03-06 RX ORDER — INSULIN LISPRO 100 [IU]/ML
INJECTION, SOLUTION INTRAVENOUS; SUBCUTANEOUS
Status: DISCONTINUED | OUTPATIENT
Start: 2019-03-06 | End: 2019-03-08 | Stop reason: HOSPADM

## 2019-03-06 RX ORDER — ASPIRIN 81 MG/1
81 TABLET ORAL EVERY 12 HOURS
Status: DISCONTINUED | OUTPATIENT
Start: 2019-03-06 | End: 2019-03-08 | Stop reason: HOSPADM

## 2019-03-06 RX ORDER — HYDROMORPHONE HYDROCHLORIDE 2 MG/ML
1 INJECTION, SOLUTION INTRAMUSCULAR; INTRAVENOUS; SUBCUTANEOUS
Status: DISCONTINUED | OUTPATIENT
Start: 2019-03-06 | End: 2019-03-08 | Stop reason: HOSPADM

## 2019-03-06 RX ORDER — ROPIVACAINE HYDROCHLORIDE 2 MG/ML
INJECTION, SOLUTION EPIDURAL; INFILTRATION; PERINEURAL
Status: COMPLETED | OUTPATIENT
Start: 2019-03-06 | End: 2019-03-06

## 2019-03-06 RX ADMIN — ROPIVACAINE HYDROCHLORIDE 40 MG: 2 INJECTION, SOLUTION EPIDURAL; INFILTRATION; PERINEURAL at 13:32

## 2019-03-06 RX ADMIN — HYDROMORPHONE HYDROCHLORIDE 2 MG: 2 TABLET ORAL at 21:52

## 2019-03-06 RX ADMIN — INSULIN LISPRO 3 UNITS: 100 INJECTION, SOLUTION INTRAVENOUS; SUBCUTANEOUS at 22:00

## 2019-03-06 RX ADMIN — EPHEDRINE SULFATE 10 MG: 50 INJECTION, SOLUTION INTRAVENOUS at 14:17

## 2019-03-06 RX ADMIN — SODIUM CHLORIDE, SODIUM LACTATE, POTASSIUM CHLORIDE, AND CALCIUM CHLORIDE 75 ML/HR: 600; 310; 30; 20 INJECTION, SOLUTION INTRAVENOUS at 10:02

## 2019-03-06 RX ADMIN — MIDAZOLAM 1 MG: 1 INJECTION INTRAMUSCULAR; INTRAVENOUS at 13:30

## 2019-03-06 RX ADMIN — SODIUM CHLORIDE, SODIUM LACTATE, POTASSIUM CHLORIDE, AND CALCIUM CHLORIDE: 600; 310; 30; 20 INJECTION, SOLUTION INTRAVENOUS at 14:15

## 2019-03-06 RX ADMIN — Medication 10 ML: at 22:00

## 2019-03-06 RX ADMIN — FENOFIBRATE 160 MG: 160 TABLET ORAL at 21:52

## 2019-03-06 RX ADMIN — Medication 2 G: at 21:50

## 2019-03-06 RX ADMIN — TRANEXAMIC ACID 1000 MG: 100 INJECTION, SOLUTION INTRAVENOUS at 13:44

## 2019-03-06 RX ADMIN — SODIUM CHLORIDE 100 ML/HR: 900 INJECTION, SOLUTION INTRAVENOUS at 17:00

## 2019-03-06 RX ADMIN — ASPIRIN 81 MG: 81 TABLET ORAL at 21:51

## 2019-03-06 RX ADMIN — LOSARTAN POTASSIUM 100 MG: 50 TABLET ORAL at 21:51

## 2019-03-06 RX ADMIN — CELECOXIB 200 MG: 200 CAPSULE ORAL at 21:52

## 2019-03-06 RX ADMIN — FENTANYL CITRATE 50 MCG: 50 INJECTION INTRAMUSCULAR; INTRAVENOUS at 13:30

## 2019-03-06 RX ADMIN — BUPIVACAINE HYDROCHLORIDE 9 MG: 7.5 INJECTION, SOLUTION INTRASPINAL at 13:52

## 2019-03-06 RX ADMIN — PROPOFOL 160 MCG/KG/MIN: 10 INJECTION, EMULSION INTRAVENOUS at 13:55

## 2019-03-06 RX ADMIN — MIDAZOLAM HYDROCHLORIDE 1 MG: 1 INJECTION, SOLUTION INTRAMUSCULAR; INTRAVENOUS at 14:24

## 2019-03-06 RX ADMIN — ACETAMINOPHEN 1000 MG: 10 INJECTION, SOLUTION INTRAVENOUS at 18:15

## 2019-03-06 RX ADMIN — ACETAMINOPHEN 1000 MG: 500 TABLET, FILM COATED ORAL at 23:30

## 2019-03-06 RX ADMIN — EPHEDRINE SULFATE 10 MG: 50 INJECTION, SOLUTION INTRAVENOUS at 14:05

## 2019-03-06 RX ADMIN — ROPIVACAINE HYDROCHLORIDE 10 ML: 5 INJECTION, SOLUTION EPIDURAL; INFILTRATION; PERINEURAL at 13:32

## 2019-03-06 RX ADMIN — METFORMIN HYDROCHLORIDE 500 MG: 500 TABLET ORAL at 21:52

## 2019-03-06 RX ADMIN — Medication 1 AMPULE: at 21:51

## 2019-03-06 RX ADMIN — ONDANSETRON 4 MG: 2 INJECTION INTRAMUSCULAR; INTRAVENOUS at 13:46

## 2019-03-06 RX ADMIN — DEXAMETHASONE SODIUM PHOSPHATE 10 MG: 4 INJECTION, SOLUTION INTRA-ARTICULAR; INTRALESIONAL; INTRAMUSCULAR; INTRAVENOUS; SOFT TISSUE at 13:46

## 2019-03-06 RX ADMIN — HYDROCHLOROTHIAZIDE 25 MG: 25 TABLET ORAL at 21:51

## 2019-03-06 RX ADMIN — MIDAZOLAM HYDROCHLORIDE 1 MG: 1 INJECTION, SOLUTION INTRAMUSCULAR; INTRAVENOUS at 14:14

## 2019-03-06 RX ADMIN — Medication 2 G: at 13:41

## 2019-03-06 NOTE — ANESTHESIA PROCEDURE NOTES
Peripheral Block    Start time: 3/6/2019 1:30 PM  End time: 3/6/2019 1:32 PM  Performed by: Bayron Arteaga MD  Authorized by: Bayron Arteaga MD       Pre-procedure: Indications: at surgeon's request and post-op pain management    Preanesthetic Checklist: patient identified, risks and benefits discussed, site marked, timeout performed, anesthesia consent given and patient being monitored    Timeout Time: 13:30          Block Type:   Block Type:   Adductor canal  Laterality:  Right  Monitoring:  Standard ASA monitoring, continuous pulse ox, frequent vital sign checks, heart rate, oxygen and responsive to questions  Injection Technique:  Single shot  Procedures: ultrasound guided    Patient Position: supine  Prep: chlorhexidine    Location:  Mid thigh  Needle Type:  Stimuplex  Needle Gauge:  21 G  Needle Localization:  Anatomical landmarks and ultrasound guidance    Assessment:  Number of attempts:  1  Injection Assessment:  Incremental injection every 5 mL, local visualized surrounding nerve on ultrasound, negative aspiration for blood, no intravascular symptoms, ultrasound image on chart and no paresthesia  Patient tolerance:  Patient tolerated the procedure well with no immediate complications  NOTE: No 3.0% Ropivacaine was administered in this nerve block

## 2019-03-06 NOTE — PERIOP NOTES
TRANSFER - OUT REPORT:    Verbal report given to [facility] RN(name) on Lavinia Tyler  being transferred to PRE-OP(unit) for routine progression of care       Report consisted of patients Situation, Background, Assessment and   Recommendations(SBAR). Information from the following report(s) SBAR, MAR and Recent Results was reviewed with the receiving nurse. Lines:   Peripheral IV 03/06/19 Left Forearm (Active)   Site Assessment Clean, dry, & intact 3/6/2019  9:53 AM   Phlebitis Assessment 0 3/6/2019  9:53 AM   Infiltration Assessment 0 3/6/2019  9:53 AM   Dressing Status Clean, dry, & intact 3/6/2019  9:53 AM   Dressing Type Tape;Transparent 3/6/2019  9:53 AM   Hub Color/Line Status Pink; Infusing 3/6/2019  9:53 AM   Action Taken Blood drawn 3/6/2019  9:53 AM        Opportunity for questions and clarification was provided.       Patient transported with:   [id number]

## 2019-03-06 NOTE — PERIOP NOTES
TRANSFER - IN REPORT:    Verbal report received from [address] on [place]  being received from ortho for routine progression of care      Report consisted of patients Situation, Background, Assessment and   Recommendations(SBAR). Information from the following report(s) SBAR was reviewed with the receiving nurse. Opportunity for questions and clarification was provided. Assessment completed upon patients arrival to unit and care assumed.

## 2019-03-06 NOTE — OP NOTES
[facility]  Cementless Total Knee Arthroplasty: Posterior Cruciate Retaining     Patient:Hedy Jordan   : 1945  Medical Record [id number]  Pre-operative Diagnosis:  Primary osteoarthritis of right knee [M17.11]  Post-operative Diagnosis: Osteoarthritis of right knee  Location: [address] [place] [address]  Surgeon: Layla Barrera MD   Assistant: Joya Brown    Anesthesia: Spinal and FNB    Procedure:Procedure(s) (LRB):  KNEE ARTHROPLASTY TOTAL/ RIGHT (Right)   The complexity of the total joint surgery requires the use of a first assistant for positioning, retraction and expertise in closure. Tourniquet Time: 0 minutes  EBL: 250 cc  Findings: severe degenerative arthritis, patellar osteophytes, posterior femoral osteophytes   BMI: Body mass index is 40.43 kg/m². Kary Matos was brought to the operating room and positioned on the operating table. She was anesthestized with anesthesia. A darnell catheter was placed preoperatively and IV antibiotics was administered. Prior to the incision being made a timeout was called identifying the patient, procedure ,operative side and surgeon The operative leg was prepped and draped in the usual sterile manner. An anterior longitudinal incision was accomplished just medial to the tibial tubercle and extending approximal 6 centimeters proximal to the superior pole of the patella. A medial parapatellar capsular incision was performed. The medial capsular flap was elevated around to the insertion of the semimembranous tendon. The patella was everted and the knee flexed and externally rotated. The medial and external menisci were excised. The lateral half of the fat pad excised and the patella femoral ligament was released. The anterior cruciate ligament was resect and the posterior cruciate ligament was retained. Using extramedullary instrumentation, the tibial cut was accomplished with appropriate posterior slope.       The distal femur was addressed. A drill hole was made above the intracondylar notch. Using appropriate intramedullary instrumentation,a five degree valgus distal cut was accomplished. The femur was sized. The anterior and posterior cuts were then made about the distal femur. The osteophytes were removed from the tibial and femoral surfaces. The flexion and extension gaps were assessed with the appropriate spacer blocks. Additional surgical procedures included: none. The flexion and extension gaps were deemed appropriately balanced. The appropriate cutting blocks were then utilized to perform the anterior, posterior and chamfer cuts, with appropriate lateral translation accomplished for the patellofemoral groove. Approximately 9 mm of bone was removed from the high side of the tibia. The tibia was sized. .  The tibial base plate was pinned into place with the appropriate external rotation and stem site prepared. A preliminary range of motion was accomplished. The  Patient was found to obtain full extension as well as appropriate flexion. The patient's ligaments were stable in flexion and extension to medial and lateral stressing and the alignment was through the appropriate mechanical axis. The patella was then everted. The bone was resect to accommodate the three peg patella button. A trial reduction revealed appropriate tracking through the patellofemoral groove with no lateral retinacular release being accomplished. All trial components were removed. The real implants were opened: Sizes listed below. The knee was irrigated. There were no femoral deficiencies. There were no tibial deficiencies. No augmentation was utilized. The permanent cementless Tibial and Femoral components were impacted into place. The cementless  patella component was then pressed in place. Elmo Pal knee was placed through range of motion and noted to be stable as mentioned above with the trail components. The wound was dry, therefore no drain was used. The operative knee was injected with 60 cc of Naropin, 10 cc's of morphine and 1 cc of 30 mg of Toradol. The knee was then soaked with a diluted betadine solution for approximately 3 min. This was then thoroughly irrigated. The capsular layer was closed using a #1 PDS suture. Then, 1 gram (100 mg/ml) of Transexamic Acid was injected into the joint space. The subcutaneous layers were closed using 2-0 Stratafix. Finally the skin was closed using 3-0 Vicryl and skin staples, which were applied in occlusive fashion and sterile bandage applied. An Iceman cryo pad was applied on the operative leg. Sponge count and needle counts were correct. Cortez Cid left the operating room     Implants:   Implant Name Type Inv.  Item Serial No.  Lot No. LRB No. Used   COMPNT FEM CR TRIATHLN 3 R PA --  - [id number]  COMPNT FEM CR TRIATHLN 3 R PA --  [id number] [employer] [facility] [id number] Right 1   BASEPLT TIB PC TRITNM SZ 3 -- TRIATHLON - [id number]  BASEPLT TIB PC TRITNM SZ 3 -- TRIATHLON [id number] [employer] HOW [id number] Right 1   PAT ASYM MTL-BK 10MM SZ A32 -- TRIATHLON - [id number]  PAT ASYM MTL-BK 10MM SZ A32 -- TRIATHLON [id number] [employer] [facility] [id number] Right 1   INSERT TIB CR TRIATHLN 3 9MM --  - [id number]  INSERT TIB CR TRIATHLN 3 9MM --  [id number] [employer] HOW [id number] Right 1         Signed By: Kristen Rodriguez MD   3/6/2019,  3:08 PM

## 2019-03-06 NOTE — PROGRESS NOTES
Care Management Interventions  Mode of Transport at Discharge: Self  Transition of Care Consult (CM Consult): [address]: Yes  Physical Therapy Consult: Yes  Occupational Therapy Consult: Yes  Current Support Network: Lives with Spouse  Confirm Follow Up Transport: Family  Plan discussed with Pt/Family/Caregiver: Yes  Freedom of Choice Offered: Yes  Discharge Location  Discharge Placement: Home with home health    Patient is a 72y.o. year old female admitted for Right TKA . Patient lives with Her spouse and plans to return home on discharge. Order received to arrange home health. Patient without preference towards agency. Referral sent to [facility]. Patient hopes to go home on Thurs POD # 1. Patient requesting we arrange a walker and bedside commode. Referral sent to [facility] who will deliver to the hospital room prior to discharge. Will follow until discharge.

## 2019-03-06 NOTE — PROGRESS NOTES
TRANSFER - IN REPORT:    Verbal report received from Spencer Sheehan RN on Jeison Billy  being received from PACU for routine post - op      Report consisted of patients Situation, Background, Assessment and   Recommendations(SBAR). Information from the following report(s) SBAR, Intake/Output and MAR was reviewed with the receiving nurse. Opportunity for questions and clarification was provided. Assessment completed upon patients arrival to unit and care assumed. Oriented to room, bed controls, and how to order meals. No complaints. Moving feet. Aquacel dry and intact to R knee with iceman. SCDs and yellow gripper socks to feet. Instructed not to get up without staff to assist. Using IS. To call for pain medication when needed.

## 2019-03-06 NOTE — H&P
The patient has end stage arthritis of the right knee. The patient was see and examined and there are no changes to the patient's orthopedic condition. They have tried conservative treatment for this condition; including antiinflammatories and lifestyle modifications and have failed. The necessity for the joint replacement is still present, and the H&P from the office is still current. The patient will be admitted today for Procedure(s) (LRB):  KNEE ARTHROPLASTY TOTAL/ RIGHT/ YOVANI/ FNB (Right) .

## 2019-03-06 NOTE — ANESTHESIA PREPROCEDURE EVALUATION
Anesthetic History               Review of Systems / Medical History      Pulmonary  Within defined limits                 Neuro/Psych         Psychiatric history    Comments: anxiety Cardiovascular    Hypertension              Exercise tolerance: >4 METS     GI/Hepatic/Renal         Renal disease: CRI       Endo/Other    Diabetes: type 2    Morbid obesity and arthritis     Other Findings            Physical Exam    Airway  Mallampati: II  TM Distance: > 6 cm  Neck ROM: normal range of motion   Mouth opening: Normal     Cardiovascular    Rhythm: regular           Dental    Dentition: Caps/crowns     Pulmonary                 Abdominal  GI exam deferred       Other Findings            Anesthetic Plan    ASA: 3  Anesthesia type: spinal - femoral single shot      Post-op pain plan if not by surgeon: peripheral nerve block single    Induction: Intravenous  Anesthetic plan and risks discussed with: Patient

## 2019-03-06 NOTE — PERIOP NOTES
Betadine lavage:  17.5cc of betadine lot # [id number], exp. Date 06/2020,  in 500cc of . 9NS Lot # [id number], exp.  Date : 11/01/2021

## 2019-03-06 NOTE — ANESTHESIA PROCEDURE NOTES
Spinal Block    Start time: 3/6/2019 1:58 PM  End time: 3/6/2019 1:52 PM  Performed by: Miryam Schroeder MD  Authorized by: Miryam Schroeder MD     Pre-procedure: Indications: primary anesthetic  Preanesthetic Checklist: patient identified, risks and benefits discussed, anesthesia consent, site marked, patient being monitored and timeout performed    Timeout Time: 13:48          Spinal Block:   Patient Position:  Seated  Prep Region:  Lumbar  Prep: chlorhexidine and patient draped      Location:  L2-3  Technique:  Single shot        Needle:   Needle Type:  Pencan  Needle Gauge:  25 G  Attempts:  3      Events: CSF confirmed, no blood with aspiration and paresthesia        Assessment:  Insertion:  Uncomplicated  Patient tolerance:  Patient tolerated the procedure well with no immediate complications  Failed attempts at apparent L3-4 and L2-3 midline.  Success paramedian at L2-3?  + paresthesia--resolved before injection

## 2019-03-06 NOTE — CONSULTS
Hospitalist Consult    This is a 72-year-old female with a past medical history of diabetes, hypertension, hyperlipidemia admitted to the hospital for right total knee arthroplasty for end-stage arthritis of the right knee. At home patient is on losartan 100 mg once daily, hydrochlorothiazide 25 mg once daily,Metformin 500 mg daily currently those medications are resumed blood pressure is stable, will order for low-dose sliding scale insulin    I have reviewed his current medications, vital signs, labs as listed below      There is no active medical issue which needs to be addressed at this time. I will sign off. Please call with any questions or concerns. Thank you for the consult.     Patient will not be charged for this encounter. Prior to Admission medications    Medication Sig Start Date End Date Taking? Authorizing Provider   aspirin 81 mg chewable tablet Take 81 mg by mouth daily. Yes Provider, Historical   HYDROmorphone (DILAUDID) 2 mg tablet Take 1 Tab by mouth every four (4) hours as needed for Pain for up to 30 days. Max Daily Amount: 12 mg. 3/6/19 4/5/19 Yes CHRISTOPHER Stafford   cholecalciferol, vitamin D3, (VITAMIN D3) 2,000 unit tab Take  by mouth nightly. Yes Provider, Historical   losartan (COZAAR) 100 mg tablet Take 1 Tab by mouth daily. Patient taking differently: Take 100 mg by mouth nightly. 11/28/18  Yes Timothy Abad MD   atorvastatin (LIPITOR) 10 mg tablet Take 1 Tab by mouth daily. Patient taking differently: Take 10 mg by mouth nightly. 4/16/18  Yes Timothy Abad MD   fenofibrate (LOFIBRA) 160 mg tablet Take 1 Tab by mouth daily. Patient taking differently: Take 160 mg by mouth nightly. 4/16/18  Yes Timothy Abad MD   metFORMIN (GLUCOPHAGE) 500 mg tablet Take 1 Tab by mouth daily (with breakfast). Patient taking differently: Take 500 mg by mouth nightly. 4/16/18  Yes Timothy Abad MD   citalopram (CELEXA) 20 mg tablet Take 1 Tab by mouth daily.   Patient taking differently: Take 20 mg by mouth every evening. 4/16/18  Yes Naomi Trotter MD   hydroCHLOROthiazide (HYDRODIURIL) 25 mg tablet Take 1 Tab by mouth daily. Patient taking differently: Take 25 mg by mouth nightly. 4/16/18  Yes Naomi Trotter MD   aspirin (ASPIRIN) 325 mg tablet Take 325 mg by mouth nightly. Yes Provider, Historical   calcium 500 mg tab Take  by mouth nightly.     Provider, Historical         Current Facility-Administered Medications:     fenofibrate (LOFIBRA) tablet 160 mg, 160 mg, Oral, QHS, Perico Medellin, PA    hydroCHLOROthiazide (HYDRODIURIL) tablet 25 mg, 25 mg, Oral, QHS, CHRISTOPHER Real    losartan (COZAAR) tablet 100 mg, 100 mg, Oral, QHS, CHRISTOPHER Real    metFORMIN (GLUCOPHAGE) tablet 500 mg, 500 mg, Oral, QHS, Perico Medellin, PA    alcohol 62% (NOZIN) nasal  1 Ampule, 1 Ampule, Topical, Q12H, CHRISTOPHER Real    0.9% sodium chloride infusion, 100 mL/hr, IntraVENous, CONTINUOUS, CHRISTOPHER Real    sodium chloride (NS) flush 5-40 mL, 5-40 mL, IntraVENous, Q8H, CHRISTOPHER Real    sodium chloride (NS) flush 5-40 mL, 5-40 mL, IntraVENous, PRN, CHRISTOPHER Real    ceFAZolin (ANCEF) 2 g/20 mL in sterile water IV syringe, 2 g, IntraVENous, Q8H, CHRISTOPHER Real    acetaminophen (OFIRMEV) infusion 1,000 mg, 1,000 mg, IntraVENous, ONCE, Perico Medellin [place]    [START ON 3/7/2019] acetaminophen (TYLENOL) tablet 1,000 mg, 1,000 mg, Oral, Q6H, CHRISTOPHER Real    celecoxib (CELEBREX) capsule 200 mg, 200 mg, Oral, Q12H, CHRISTOPHER Real    HYDROmorphone (DILAUDID) tablet 2 mg, 2 mg, Oral, Q4H PRN, CHRISTOPHER Real    HYDROmorphone (PF) (DILAUDID) injection 1 mg, 1 mg, IntraVENous, Q3H PRN, CHRISTOPHER Real    naloxone [facility]) injection 0.2-0.4 mg, 0.2-0.4 mg, IntraVENous, Q10MIN PRN, CHRISTOPHER Real    [START ON 3/7/2019] dexamethasone (DECADRON) injection 10 mg, 10 mg, IntraVENous, ONCE, Merla Butt, PA    ondansetron (ZOFRAN) injection 4 mg, 4 mg, IntraVENous, Q4H PRN, CHRISTOPHER Mirza    diphenhydrAMINE (BENADRYL) capsule 25 mg, 25 mg, Oral, Q4H PRN, CHRISTOPHER Mirza    [START ON 3/7/2019] senna-docusate (PERICOLACE) 8.6-50 mg per tablet 2 Tab, 2 Tab, Oral, DAILY, CHRISTOPHER Mirza    aspirin delayed-release tablet 81 mg, 81 mg, Oral, Q12H, CHRISTOPHER Mirza    [START ON 3/7/2019] enoxaparin (LOVENOX) injection 40 mg, 40 mg, SubCUTAneous, DAILY, CHRISTOPHER Mirza    Visit Vitals  /72   Pulse 66   Temp 97.9 °F (36.6 °C)   Resp 18   Ht 5' (1.524 m)   Wt 93.9 kg (207 lb 0.2 oz)   SpO2 98%   Breastfeeding?  No   BMI 40.43 kg/m²        Recent Results (from the past 24 hour(s))   TYPE & SCREEN    Collection Time: 03/06/19 10:00 AM   Result Value Ref Range    Crossmatch Expiration 03/09/2019     ABO/Rh(D) Belen Killer POSITIVE     Antibody screen NEG    GLUCOSE, POC    Collection Time: 03/06/19 10:12 AM   Result Value Ref Range    Glucose (POC) 119 (H) 65 - 100 mg/dL

## 2019-03-06 NOTE — PERIOP NOTES
Teach back method used in review of Hibiclens usage preop/postop, TB screening, pain management goals, falls precautions and use of Nozin for prevention of staph infections. Incentive spirometer reviewed and pt reached TOTAL TIDAL VOLUME 1500 ML OBSERVED   in preop holding.

## 2019-03-06 NOTE — CONSULTS
Physical Medicine & Rehabilitation Note-consult    Patient: Franky Esquivel MRN: [id number]  SSN: [id number]    YOB: 1945  Age: 72 y.o. Sex: female      Admit Date: 3/6/2019  Admitting Physician: Yesica Rowe MD    Medical Decision Making/Plan/Recommend: Gait impairment and functional deficits following right total knee arthroplasty. Discussed rehab options and goals with patient. Patient is progressing well, and shows no major barriers to progress. Anticipate patient will reach short term goals for safe discharge home. Patient will benefit from continued skilled rehab through [facility] PT. Recommend sub acute rehab at [facility]. Continue PT, OT for active/assisted/passive ROM, strengthening, mobility, transfers, gait training. Will follow progress. Patient is expected to make cotinued functional gains. Continued rehab at home via [facility] PT would be reasonable and necessary. Continue PT, OT for active/assisted/passive right TKA ROM, strengthening, mobility, transfers, gait training. Will follow progress. Chief Complaint : Gait dysfunction secondary to below. Admit Diagnosis: Primary osteoarthritis of right knee [M17.11]; Osteoarthritis of right knee [M17.11]; Osteoarthritis [M19.90]  right total knee arthroplasty    3/6/2019  Pain  DVT risk  Post op hemorrhagic anemia  Hypertension  Diabetes mellitus ([address]  Morbid obesity ([address]  Acute Rehab Dx:  Gait impairment  Mobility and ambulation deficits  Self Care/ADL deficits    Medical Dx:  Past Medical History:   Diagnosis Date    Anxiety     Arthritis     Depression     Diabetes mellitus ([address]     type 2 per PCP note, oral med only;  pt denies, reports does not check BS    Hypercholesterolemia     Hypertension     Morbid obesity ([address]     Personal history of colonic polyps     Status post total knee replacement, right 3/6/2019    Vitamin D deficiency      Subjective:       HPI: Franky Esquivel is a 72 y.o. female patient at [address] [address] who was admitted on 3/6/2019  by Lexi Murray MD with below mentioned medical history, is being seen for Physical Medicine and Rehabilitation consult. Ayo Romeo who had right knee pain/end stage DJD underwent a right total knee arthroplasty per Dr. Lexi Murray MD on 3/6/2019. Post op patient has negotiated acute therapy sessions without major barriers. Anticipate continued steady functional gains to allow for safe home discharge with [facility] PT. Patient still shows significant functional deficits due to weakness, right knee pain, decreased RO. Patient has been independent with ambulation, prior to admission, limited by right knee pain. Previous Functional Level:  independent    Current Level of Function:   bed mobility - min A, transfers - min A x2, decreased balance , ambulation - 5' with RW and min A x2. Family History   Problem Relation Age of Onset    Heart Disease Father     Hypertension Father     Breast Cancer Neg Hx       Social History     Tobacco Use    Smoking status: Never Smoker    Smokeless tobacco: Never Used   Substance Use Topics    Alcohol use: No     Past Surgical History:   Procedure Laterality Date    HX BREAST BIOPSY Left     ?  (Stereo Bx - negative per pt)    HX  SECTION      HX COLONOSCOPY  2012    polyp removed, given 3-4 year recall    HX CYST REMOVAL      neck    HX LAP CHOLECYSTECTOMY      HX YINA AND BSO        Prior to Admission medications    Medication Sig Start Date End Date Taking? Authorizing Provider   aspirin 81 mg chewable tablet Take 81 mg by mouth daily. Yes Provider, Historical   HYDROmorphone (DILAUDID) 2 mg tablet Take 1 Tab by mouth every four (4) hours as needed for Pain for up to 30 days. Max Daily Amount: 12 mg. 3/6/19 4/5/19 Yes CHRISTOPHER Gregorio   cholecalciferol, vitamin D3, (VITAMIN D3) 2,000 unit tab Take  by mouth nightly.    Yes Provider, Historical   losartan (COZAAR) 100 mg tablet Take 1 Tab by mouth daily. Patient taking differently: Take 100 mg by mouth nightly. 18  Yes Evy Brown MD   atorvastatin (LIPITOR) 10 mg tablet Take 1 Tab by mouth daily. Patient taking differently: Take 10 mg by mouth nightly. 18  Yes Evy Brown MD   fenofibrate (LOFIBRA) 160 mg tablet Take 1 Tab by mouth daily. Patient taking differently: Take 160 mg by mouth nightly. 18  Yes Evy Brown MD   metFORMIN (GLUCOPHAGE) 500 mg tablet Take 1 Tab by mouth daily (with breakfast). Patient taking differently: Take 500 mg by mouth nightly. 18  Yes Evy Brown MD   citalopram (CELEXA) 20 mg tablet Take 1 Tab by mouth daily. Patient taking differently: Take 20 mg by mouth every evening. 18  Yes Evy Brown MD   hydroCHLOROthiazide (HYDRODIURIL) 25 mg tablet Take 1 Tab by mouth daily. Patient taking differently: Take 25 mg by mouth nightly. 18  Yes Evy Brown MD   aspirin (ASPIRIN) 325 mg tablet Take 325 mg by mouth nightly. Yes Provider, Historical   calcium 500 mg tab Take  by mouth nightly. Provider, Historical     No Known Allergies     Review of Systems: +right knee pain, +antalgic gait. Denies chest pain, shortness of breath, cough, headache, visual problems, abdominal pain, dysurea, calf pain. Pertinent positives are as noted in the medical records and unremarkable otherwise. Objective:     Vitals:  Blood pressure 106/65, pulse 67, temperature 97.8 °F (36.6 °C), resp. rate 18, height 5' (1.524 m), weight 207 lb 0.2 oz (93.9 kg), SpO2 97 %, not currently breastfeeding. Temp (24hrs), Av.2 °F (36.8 °C), Min:97.8 °F (36.6 °C), Max:98.8 °F (37.1 °C)    BMI (calculated): 40.4 (19 0813)   Intake and Output:   1901 -  0700  In: 18 [P.O.:480; I.V.:1000]  Out: 2200 [Urine:2000]    Physical Exam:   General: Alert and age appropriately oriented. No acute cardio respiratory distress.    HEENT: Normocephalic, no conjunctival pallor, no scleral icterus  Oral mucosa moist without cyanosis, no JVD   Lungs: CTA   Heart: Regular rate and rhythm, S1, S2   No  murmurs, clicks, rub or gallops   Abdomen: Soft, non-tender, non-distended. Genitourinary: defered   Neuromuscular:      Grossly no focal motor deficits. Right knee extension strong  Right ankle dorsiflexion 5/5  Right ankle plantarflexion 5/5  No sensory deficits distally BLE to soft touch. Skin/extremity: No calf tenderness BLE. No rashes, no marginal erythema.                                                                                          Labs/Studies:  Recent Results (from the past 72 hour(s))   TYPE & SCREEN    Collection Time: 03/06/19 10:00 AM   Result Value Ref Range    Crossmatch Expiration 03/09/2019     ABO/Rh(D) O POSITIVE     Antibody screen NEG    GLUCOSE, POC    Collection Time: 03/06/19 10:12 AM   Result Value Ref Range    Glucose (POC) 119 (H) 65 - 100 mg/dL   HEMOGLOBIN    Collection Time: 03/06/19  7:01 PM   Result Value Ref Range    HGB 10.6 (L) 11.7 - 15.4 g/dL   GLUCOSE, POC    Collection Time: 03/06/19  8:21 PM   Result Value Ref Range    Glucose (POC) 296 (H) 65 - 100 mg/dL   HEMOGLOBIN    Collection Time: 03/07/19  4:28 AM   Result Value Ref Range    HGB 9.8 (L) 11.7 - 27.3 g/dL   METABOLIC PANEL, BASIC    Collection Time: 03/07/19  4:28 AM   Result Value Ref Range    Sodium 138 136 - 145 mmol/L    Potassium 3.4 (L) 3.5 - 5.1 mmol/L    Chloride 103 98 - 107 mmol/L    CO2 27 21 - 32 mmol/L    Anion gap 8 mmol/L    Glucose 131 (H) 65 - 100 mg/dL    BUN 21 8 - 23 MG/DL    Creatinine 1.08 (H) 0.6 - 1.0 MG/DL    GFR est AA >60 >60 ml/min/1.73m2    GFR est non-AA 53 ml/min/1.73m2    Calcium 8.3 8.3 - 10.4 MG/DL   GLUCOSE, POC    Collection Time: 03/07/19  7:12 AM   Result Value Ref Range    Glucose (POC) 135 (H) 65 - 100 mg/dL       Functional Assessment:  Reviewed participation and progress in therapies      Bed Mobility  Supine to Sit: Stand-by assistance;Contact guard assistance (03/07/19 0810)  Scooting: Contact guard assistance (03/07/19 0810)   Balance  Sitting: Intact (03/07/19 0810)  Standing: Pull to stand; With support (03/07/19 0810)               Bed/Mat Mobility  Supine to Sit: Stand-by assistance;Contact guard assistance (03/07/19 0810)  Sit to Stand: Minimum assistance (03/07/19 0810)  Bed to Chair: Minimum assistance (03/07/19 0810)  Scooting: Contact guard assistance (03/07/19 0810)     Ambulation:       Impression/Plan:     Principal Problem:    Status post total knee replacement, right (3/6/2019)    Active Problems:    Osteoarthritis of right knee (3/6/2019)      Osteoarthritis (3/6/2019)        Current Facility-Administered Medications   Medication Dose Route Frequency Provider Last Rate Last Dose    fenofibrate (LOFIBRA) tablet 160 mg  160 mg Oral QHS Janusz Dongola, PA   160 mg at 03/06/19 2152    hydroCHLOROthiazide (HYDRODIURIL) tablet 25 mg  25 mg Oral QHS Janusz Dongola, PA   25 mg at 03/06/19 2151    losartan (COZAAR) tablet 100 mg  100 mg Oral QHS Janusz Dongola, PA   100 mg at 03/06/19 2151    metFORMIN (GLUCOPHAGE) tablet 500 mg  500 mg Oral QHS Janusz Dongola, PA   500 mg at 03/06/19 2152    alcohol 62% (NOZIN) nasal  1 Ampule  1 Ampule Topical Q12H Janusz Dongola, PA   1 Ampule at 03/07/19 0856    0.9% sodium chloride infusion  100 mL/hr IntraVENous CONTINUOUS Janusz Dongola,  mL/hr at 03/06/19 1700 100 mL/hr at 03/06/19 1700    sodium chloride (NS) flush 5-40 mL  5-40 mL IntraVENous Q8H Janusz Dongola, PA   10 mL at 03/06/19 2200    sodium chloride (NS) flush 5-40 mL  5-40 mL IntraVENous PRN Janusz Dongola, PA        acetaminophen (TYLENOL) tablet 1,000 mg  1,000 mg Oral Q6H Janusz Dongola, PA   1,000 mg at 03/07/19 8601    celecoxib (CELEBREX) capsule 200 mg  200 mg Oral Q12H CHRISTOPHER [place]   200 mg at 03/07/19 0857    HYDROmorphone (DILAUDID) tablet 2 mg  2 mg Oral Q4H PRN CHRISTOPHER Peñaloza   2 mg at 03/07/19 3264    HYDROmorphone (PF) (DILAUDID) injection 1 mg  1 mg IntraVENous Q3H PRN CHRISTOPHER Peñaloza        naloxone [facility]) injection 0.2-0.4 mg  0.2-0.4 mg IntraVENous Q10MIN PRN CHRISTOPHER Peñaloza        dexamethasone (DECADRON) injection 10 mg  10 mg IntraVENous Deeanna Jarvis, Junnie Boast, [place]        ondansetron [facility]) injection 4 mg  4 mg IntraVENous Q4H PRN CHRISTOPHER Peñaloza        diphenhydrAMINE (BENADRYL) capsule 25 mg  25 mg Oral Q4H PRN CHRISTOPHER Peñaloza        senna-docusate (PERICOLACE) 8.6-50 mg per tablet 2 Tab  2 Tab Oral DAILY CHRISTOPHER Peñaloza   2 Tab at 03/07/19 6774    aspirin delayed-release tablet 81 mg  81 mg Oral Q12H CHRISTOPHER Peñaloza   81 mg at 03/07/19 0857    enoxaparin (LOVENOX) injection 40 mg  40 mg SubCUTAneous DAILY CHRISTOPHER Peñaloza   40 mg at 03/07/19 2917    insulin lispro (HUMALOG) injection   SubCUTAneous AC&HS Arley Irwin MD   3 Units at 03/06/19 2200    dextrose 40% (GLUTOSE) oral gel 1 Tube  15 g Oral PRN Arley Irwin MD        glucagon (GLUCAGEN) injection 1 mg  1 mg IntraMUSCular PRN Russ Ulrich MD        dextrose (D50W) injection syrg 12.5-25 g  25-50 mL IntraVENous PRN Arley Irwin MD            Recommendations:  Plan for home discharge with [facility] PT. Continue Acute Rehab Program.  Coordination of rehab/medical care. Counseling of Physical Medicine & Rehab care issues management. Monitoring and management of rehab conditions per the plan of care/orders. Rehabilitation Management/ Medical Management: 1. Devices:Walkers, Type: Rolling Walker  2. Consult:Rehab team including PT, OT,  and . 3. Disposition Rehab-discussed with patient. 4. Thigh-high or knee-high RUSTAM's when out of bed. 5. DVT Prophylaxis - aspirin 81mg bid x 30days. 6. Incentive spirometer Q1H while awake  7. Post op hemorrhagic anemia- monitor. 8. Activity: WBAT RLE  9. Planned Labs: CBC,BMP  10.  Pain Control: Continue with scheduled tylenol, celebrex and  PRN meds. Continue current management. 11. Wound Care: Keep right TKA wound clean and dry and reinforce dressing PRN. May remove Aquacel 1 week post op ad replace with new one. Remove staples 12-14 post surgery, when incision appears appropriately closed and apply benzoin and 1/2\" steristrips. Follow up with ORTHO per instructions. Thank you for the opportunity to participate in the care of this patient.     Signed By: Viktoriya Xie MD

## 2019-03-06 NOTE — PROGRESS NOTES
[facility]  Face to Face Encounter    Patients Name: Luc Stephens    YOB: 1945    Ordering Physician:  Kimber Eller    Primary Diagnosis: Primary osteoarthritis of right knee [M17.11]  Osteoarthritis of right knee [M17.11]  Osteoarthritis [M19.90]  S/p right TKA    Date of Face to Face:   3/6/2019                                  Face to Face Encounter findings are related to primary reason for home care:   yes. 1. I certify that the patient needs intermittent care as follows: physical therapy: gait/stair training    2. I certify that this patient is homebound, that is: 1) patient requires the use of a walker device, special transportation, or assistance of another to leave the home; or 2) patient's condition makes leaving the home medically contraindicated; and 3) patient has a normal inability to leave the home and leaving the home requires considerable and taxing effort. Patient may leave the home for infrequent and short duration for medical reasons, and occasional absences for non-medical reasons. Homebound status is due to the following functional limitations: Patient's ambulation limited secondary to severe pain and requires the use of an assistive device and the assistance of a caregiver for safe completion. Patient with strength and ROM deficits limiting ambulation endurance requiring the use of an assistive device and the assistance of a caregiver. Patient deemed temporarily homebound secondary to increased risk for infection when leaving home and going out into the community. 3. I certify that this patient is under my care and that I, or a nurse practitioner or [facility], or clinical nurse specialist, or certified nurse midwife, working with me, had a Face-to-Face Encounter that meets the physician Face-to-Face Encounter requirements.   The following are the clinical findings from the [address] [place] [address] encounter that support the need for skilled services and is a summary of the encounter: see hospital chart      Constance Dawkins, POP  3/6/2019      THE FOLLOWING TO BE COMPLETED BY THE COMMUNITY PHYSICIAN:    I concur with the findings described above from the F2F encounter that this patient is homebound and in need of a skilled service.     Certifying Physician: _____________________________________      Printed Certifying Physician Name: _____________________________________    Date: _________________

## 2019-03-06 NOTE — PERIOP NOTES
TRANSFER - OUT REPORT:    Verbal report given to [facility] RN(name) on Idris Snider  being transferred to [facility](unit) for routine progression of care       Report consisted of patients Situation, Background, Assessment and   Recommendations(SBAR). Information from the following report(s) SBAR, Kardex, OR Summary, Intake/Output, MAR and Cardiac Rhythm NSR was reviewed with the receiving nurse. Lines:   Peripheral IV 03/06/19 Left Forearm (Active)   Site Assessment Clean, dry, & intact 3/6/2019  3:40 PM   Phlebitis Assessment 0 3/6/2019  3:40 PM   Infiltration Assessment 0 3/6/2019  3:40 PM   Dressing Status Clean, dry, & intact 3/6/2019  3:40 PM   Dressing Type Transparent;Tape 3/6/2019  3:40 PM   Hub Color/Line Status Pink; Infusing 3/6/2019  3:40 PM   Action Taken Blood drawn 3/6/2019  9:53 AM        Opportunity for questions and clarification was provided.       Patient transported with:   room air

## 2019-03-06 NOTE — PROGRESS NOTES
03/06/19 1656   Oxygen Therapy   O2 Sat (%) 98 %   Pulse via Oximetry 71 beats per minute   O2 Device Room air   Pre-Treatment   Breath Sounds Bilateral Clear   Incentive Spirometry Treatment   Actual Volume (ml) 2000 ml   Number of Attempts 505 Jolynn Maldonado Notes Reviewed. Pt working on IS. Pt encouraged to do 10 breaths per hour while awake on IS. Good NPC. No respiratory distress noted at this time. No complications noted at this time.  C/s no number at bedside

## 2019-03-07 LAB
ANION GAP SERPL CALC-SCNC: 8 MMOL/L
BUN SERPL-MCNC: 21 MG/DL (ref 8–23)
CALCIUM SERPL-MCNC: 8.3 MG/DL (ref 8.3–10.4)
CHLORIDE SERPL-SCNC: 103 MMOL/L (ref 98–107)
CO2 SERPL-SCNC: 27 MMOL/L (ref 21–32)
CREAT SERPL-MCNC: 1.08 MG/DL (ref 0.6–1)
GLUCOSE BLD STRIP.AUTO-MCNC: 135 MG/DL (ref 65–100)
GLUCOSE BLD STRIP.AUTO-MCNC: 141 MG/DL (ref 65–100)
GLUCOSE BLD STRIP.AUTO-MCNC: 150 MG/DL (ref 65–100)
GLUCOSE BLD STRIP.AUTO-MCNC: 164 MG/DL (ref 65–100)
GLUCOSE SERPL-MCNC: 131 MG/DL (ref 65–100)
HGB BLD-MCNC: 9.8 G/DL (ref 11.7–15.4)
MM INDURATION POC: 0 MM (ref 0–5)
POTASSIUM SERPL-SCNC: 3.4 MMOL/L (ref 3.5–5.1)
PPD POC: NORMAL NEGATIVE
SODIUM SERPL-SCNC: 138 MMOL/L (ref 136–145)

## 2019-03-07 PROCEDURE — 65270000029 HC RM PRIVATE

## 2019-03-07 PROCEDURE — 97161 PT EVAL LOW COMPLEX 20 MIN: CPT

## 2019-03-07 PROCEDURE — 74011250636 HC RX REV CODE- 250/636: Performed by: PHYSICIAN ASSISTANT

## 2019-03-07 PROCEDURE — 80048 BASIC METABOLIC PNL TOTAL CA: CPT

## 2019-03-07 PROCEDURE — 97110 THERAPEUTIC EXERCISES: CPT

## 2019-03-07 PROCEDURE — 85018 HEMOGLOBIN: CPT

## 2019-03-07 PROCEDURE — 97535 SELF CARE MNGMENT TRAINING: CPT

## 2019-03-07 PROCEDURE — 97150 GROUP THERAPEUTIC PROCEDURES: CPT

## 2019-03-07 PROCEDURE — 74011250637 HC RX REV CODE- 250/637: Performed by: PHYSICIAN ASSISTANT

## 2019-03-07 PROCEDURE — 36415 COLL VENOUS BLD VENIPUNCTURE: CPT

## 2019-03-07 PROCEDURE — 97116 GAIT TRAINING THERAPY: CPT

## 2019-03-07 PROCEDURE — 94760 N-INVAS EAR/PLS OXIMETRY 1: CPT

## 2019-03-07 PROCEDURE — 82962 GLUCOSE BLOOD TEST: CPT

## 2019-03-07 PROCEDURE — 97165 OT EVAL LOW COMPLEX 30 MIN: CPT

## 2019-03-07 RX ADMIN — HYDROMORPHONE HYDROCHLORIDE 2 MG: 2 TABLET ORAL at 02:11

## 2019-03-07 RX ADMIN — HYDROMORPHONE HYDROCHLORIDE 2 MG: 2 TABLET ORAL at 18:12

## 2019-03-07 RX ADMIN — FENOFIBRATE 160 MG: 160 TABLET ORAL at 22:03

## 2019-03-07 RX ADMIN — Medication 5 ML: at 12:20

## 2019-03-07 RX ADMIN — SENNOSIDES AND DOCUSATE SODIUM 2 TABLET: 8.6; 5 TABLET ORAL at 08:57

## 2019-03-07 RX ADMIN — CELECOXIB 200 MG: 200 CAPSULE ORAL at 21:59

## 2019-03-07 RX ADMIN — Medication 1 AMPULE: at 22:05

## 2019-03-07 RX ADMIN — Medication 1 AMPULE: at 08:56

## 2019-03-07 RX ADMIN — LOSARTAN POTASSIUM 100 MG: 50 TABLET ORAL at 22:04

## 2019-03-07 RX ADMIN — ACETAMINOPHEN 1000 MG: 500 TABLET, FILM COATED ORAL at 12:19

## 2019-03-07 RX ADMIN — HYDROMORPHONE HYDROCHLORIDE 2 MG: 2 TABLET ORAL at 22:04

## 2019-03-07 RX ADMIN — Medication 10 ML: at 22:00

## 2019-03-07 RX ADMIN — METFORMIN HYDROCHLORIDE 500 MG: 500 TABLET ORAL at 22:03

## 2019-03-07 RX ADMIN — ASPIRIN 81 MG: 81 TABLET ORAL at 21:59

## 2019-03-07 RX ADMIN — CELECOXIB 200 MG: 200 CAPSULE ORAL at 08:57

## 2019-03-07 RX ADMIN — ACETAMINOPHEN 1000 MG: 500 TABLET, FILM COATED ORAL at 06:27

## 2019-03-07 RX ADMIN — HYDROMORPHONE HYDROCHLORIDE 2 MG: 2 TABLET ORAL at 06:27

## 2019-03-07 RX ADMIN — ENOXAPARIN SODIUM 40 MG: 40 INJECTION SUBCUTANEOUS at 08:56

## 2019-03-07 RX ADMIN — HYDROMORPHONE HYDROCHLORIDE 2 MG: 2 TABLET ORAL at 12:19

## 2019-03-07 RX ADMIN — ASPIRIN 81 MG: 81 TABLET ORAL at 08:57

## 2019-03-07 RX ADMIN — ACETAMINOPHEN 1000 MG: 500 TABLET, FILM COATED ORAL at 18:12

## 2019-03-07 RX ADMIN — Medication 2 G: at 06:27

## 2019-03-07 NOTE — PROGRESS NOTES
2019         Post Op day: 1 Day Post-OpProcedure(s) (LRB):  KNEE ARTHROPLASTY TOTAL/ RIGHT (Right)      Admit Date: 3/6/2019  Admit Diagnosis: Primary osteoarthritis of right knee [M17.11]  Osteoarthritis of right knee [M17.11]  Osteoarthritis [M19.90]    LAB:    Recent Results (from the past 24 hour(s))   TYPE & SCREEN    Collection Time: 19 10:00 AM   Result Value Ref Range    Crossmatch Expiration 2019     ABO/Rh(D) Ana Rase POSITIVE     Antibody screen NEG    GLUCOSE, POC    Collection Time: 19 10:12 AM   Result Value Ref Range    Glucose (POC) 119 (H) 65 - 100 mg/dL   HEMOGLOBIN    Collection Time: 19  7:01 PM   Result Value Ref Range    HGB 10.6 (L) 11.7 - 15.4 g/dL   GLUCOSE, POC    Collection Time: 19  8:21 PM   Result Value Ref Range    Glucose (POC) 296 (H) 65 - 100 mg/dL   HEMOGLOBIN    Collection Time: 19  4:28 AM   Result Value Ref Range    HGB 9.8 (L) 11.7 - 73.2 g/dL   METABOLIC PANEL, BASIC    Collection Time: 19  4:28 AM   Result Value Ref Range    Sodium 138 136 - 145 mmol/L    Potassium 3.4 (L) 3.5 - 5.1 mmol/L    Chloride 103 98 - 107 mmol/L    CO2 27 21 - 32 mmol/L    Anion gap 8 mmol/L    Glucose 131 (H) 65 - 100 mg/dL    BUN 21 8 - 23 MG/DL    Creatinine 1.08 (H) 0.6 - 1.0 MG/DL    GFR est AA >60 >60 ml/min/1.73m2    GFR est non-AA 53 ml/min/1.73m2    Calcium 8.3 8.3 - 10.4 MG/DL   GLUCOSE, POC    Collection Time: 19  7:12 AM   Result Value Ref Range    Glucose (POC) 135 (H) 65 - 100 mg/dL     Vital Signs:    Patient Vitals for the past 8 hrs:   BP Temp Pulse Resp SpO2   19 0649 106/65 97.8 °F (36.6 °C) 67 18 97 %   19 0418 113/65 98 °F (36.7 °C) (!) 57 18 95 %     Temp (24hrs), Av.1 °F (36.7 °C), Min:97.5 °F (36.4 °C), Max:98.8 °F (37.1 °C)    Body mass index is 40.43 kg/m².   Pain Control:   Pain Assessment  Pain Scale 1: Numeric (0 - 10)  Pain Intensity 1: 0  Pain Location 1: Knee  Pain Orientation 1: Right  Pain Description 1: Intermittent  Pain Intervention(s) 1: Medication (see MAR)    Subjective: Doing well, No complaints, No SOB, No Chest Pain, No Nausea or Vomiting, numbness in fingers of hand     Objective: Vital Signs are Stable, No Acute Distress, Alert and Oriented, Dressing is Dry,  Neurovascular exam is normal except right hand. She has numbness in median and ulnar distribution. Motor function intact. PT/OT:            Assistive Device: Walker (comment)                Weight Bearing Status: WBAT    Meds:  [unfilled]  [unfilled]  [unfilled]    Assessment:   Patient Active Problem List   Diagnosis Code    Essential hypertension with goal blood pressure less than 130/85 I10    Type 2 diabetes mellitus without complication (HCC) G46.1    Pure hypercholesterolemia E78.00    Pure hyperglyceridemia E78.1    Vitamin D deficiency E55.9    Depression F32.9    Obesity, morbid ([address] E66.01    Snoring R06.83    Osteoarthritis of right knee M17.11    Osteoarthritis M19.90    Status post total knee replacement, right Z96.651             Plan: Continue Physical Therapy, Monitor  Hbg, complaint of some numbness in right  hand. Carpal tunnel vs cervical disc.         Signed By: Moise Arora MD

## 2019-03-07 NOTE — PROGRESS NOTES
Problem: Mobility Impaired (Adult and Pediatric)  Goal: *Acute Goals and Plan of Care (Insert Text)  GOALS (1-4 days):  (1.)Ms. Radha Garza will move from supine to sit and sit to supine  in bed with SUPERVISION. (2.)Ms. Radha Garza will transfer from bed to chair and chair to bed with SUPERVISION using the least restrictive device. (3.)Ms. Malone will ambulate with SUPERVISION for 200 feet with the least restrictive device. (4.)Ms. Malone will ambulate up/down 4 steps without a railing with MINIMAL ASSIST with cane. (5.)Ms. Radha Garza will increase right knee ROM to 5°-80°.  ________________________________________________________________________________________________      PHYSICAL THERAPY [facility] tKa: Initial Assessment, Treatment Day: Day of Assessment, AM 3/7/2019  INPATIENT: Hospital Day: 2  [place] / [place] / Product Type: Medicare /      NAME/AGE/GENDER: Charisse Chamberlain is a 72 y.o. female   PRIMARY DIAGNOSIS:  Primary osteoarthritis of right knee [M17.11]   Procedure(s) and Anesthesia Type:     * KNEE ARTHROPLASTY TOTAL/ RIGHT - Spinal (Right)  ICD-10: Treatment Diagnosis:    · Pain in Right Knee (M25.561)  · Stiffness of Right Knee, Not elsewhere classified (M25.661)  · Difficulty in walking, Not elsewhere classified (R26.2)      ASSESSMENT:     Ms. Radha Garza presents s/p R TKA. Patient demonstrates decreased R LE strength and ROM and decreased independence with mobility. Patient would benefit from therapy to address these deficits. She will return home at d/c with assist from her spouse. This section established at most recent assessment   PROBLEM LIST (Impairments causing functional limitations):  1. Decreased Strength  2. Decreased ADL/Functional Activities  3. Decreased Transfer Abilities  4. Decreased Ambulation Ability/Technique  5. Decreased Balance  6. Increased Pain  7. Decreased Flexibility/Joint Mobility  8. Edema/Girth  9.  Decreased [place] with Home Exercise Program   INTERVENTIONS PLANNED: (Benefits and precautions of physical therapy have been discussed with the patient.)  1. Bed Mobility  2. Gait Training  3. Home Exercise Program (HEP)  4. Therapeutic Exercise/Strengthening  5. Transfer Training  6. Range of Motion: active/assisted/passive  7. Therapeutic Activities  8. Group Therapy     TREATMENT PLAN: Frequency/Duration: Follow patient BID for duration of hospital stay to address above goals. Rehabilitation Potential For Stated Goals: Good     RECOMMENDED REHABILITATION/EQUIPMENT: (at time of discharge pending progress): Continue Skilled Therapy and Home Health: Physical Therapy. HISTORY:   History of Present Injury/Illness (Reason for Referral):  R TKA  Past Medical History/Comorbidities:   Ms. Eli Ro  has a past medical history of Anxiety, Arthritis, Depression, Diabetes mellitus ([address] Hypercholesterolemia, Hypertension, Morbid obesity ([facility] [address] Personal history of colonic polyps, Status post total knee replacement, right (3/6/2019), and Vitamin D deficiency. Ms. Eli Ro  has a past surgical history that includes hx ryan and bso; hx cyst removal; hx lap cholecystectomy; hx  section; hx colonoscopy (2012); and hx breast biopsy (Left). Social History/Living Environment:   Home Environment: Private residence  # Steps to Enter: 4  Rails to Enter: No  One/Two Story Residence: One story  Living Alone: No  Support Systems: Spouse/Significant Other/Partner  Patient Expects to be Discharged to[de-identified] Private residence  Current DME Used/Available at Home: Cane, straight, Raised toilet seat, Shower chair, Walker, rollator  Tub or Shower Type: Shower  Prior Level of Function/Work/Activity:  Independent   Number of Personal Factors/Comorbidities that affect the Plan of Care: 0: LOW COMPLEXITY   EXAMINATION:   Most Recent Physical Functioning:      Gross Assessment  AROM: Within functional limits(L LE)  Strength:  Within functional limits(L LE) RLE Strength  R Hip Flexion: 2+  R Knee Flexion: 2+  R Knee Extension: 3-    Bed Mobility  Supine to Sit: Stand-by assistance;Contact guard assistance  Scooting: Contact guard assistance    Transfers  Sit to Stand: Stand-by assistance  Stand to Sit: Stand-by assistance  Bed to Chair: Stand-by assistance    Balance  Sitting: Intact  Standing: Pull to stand; With support              Weight Bearing Status  Right Side Weight Bearing: As tolerated  Distance (ft): 240 Feet (ft)  Ambulation - Level of Assistance: Stand-by assistance;Contact guard assistance  Assistive Device: Walker, rolling  Speed/[place]: Pace decreased (<100 feet/min)  Step Length: Left shortened;Right shortened  Stance: Right decreased  Gait Abnormalities: Antalgic  Interventions: Safety awareness training;Verbal cues     Braces/Orthotics: none    Right Knee Cold  Type: Cryocuff      Body Structures Involved:  1. Joints  2. Muscles Body Functions Affected:  1. Movement Related Activities and Participation Affected:  1. Mobility  2. Self Care   Number of elements that affect the Plan of Care: 4+: HIGH COMPLEXITY   CLINICAL PRESENTATION:   Presentation: Stable and uncomplicated: LOW COMPLEXITY   CLINICAL DECISION MAKIN[address] [place], [place] [id number] AM-PAC 6 Clicks   Basic Mobility Inpatient Short Form  How much difficulty does the patient currently have. .. Unable A Lot A Little None   1. Turning over in bed (including adjusting bedclothes, sheets and blankets)? [] 1   [] 2   [x] 3   [] 4   2. Sitting down on and standing up from a chair with arms ( e.g., wheelchair, bedside commode, etc.)   [] 1   [] 2   [x] 3   [] 4   3. Moving from lying on back to sitting on the side of the bed? [] 1   [] 2   [x] 3   [] 4   How much help from another person does the patient currently need. .. Total A Lot A Little None   4. Moving to and from a bed to a chair (including a wheelchair)? [] 1   [] 2   [x] 3   [] 4   5. Need to walk in hospital room?    [] 1   [] 2 [x] 3   [] 4   6. Climbing 3-5 steps with a railing? [] 1   [] 2   [x] 3   [] 4   © 2007, Trustees of [address] [place] [address] [address] [id number], under license to [employer]. All rights reserved        Score:  Initial: 18 Most Recent: X (Date: -- )    Interpretation of Tool:  Represents activities that are increasingly more difficult (i.e. Bed mobility, Transfers, Gait). Medical Necessity:     · Patient is expected to demonstrate progress in strength, range of motion, balance and coordination to increase independence with mobility and ADLs. Reason for Services/Other Comments:  · Patient continues to require skilled intervention due to decreased R LE strength and ROM and decreased independence with mobility s/p TKA. Use of outcome tool(s) and clinical judgement create a POC that gives a: Clear prediction of patient's progress: LOW COMPLEXITY            TREATMENT:   (In addition to Assessment/Re-Assessment sessions the following treatments were rendered)     Pre-treatment Symptoms/Complaints:  Patient ready for PT. Pain: Initial:   Pain Intensity 1: 2  Pain Location 1: Knee  Pain Orientation 1: Right  Pain Intervention(s) 1: Ambulation/Increased Activity, Cold pack, Exercise  Post Session:  2/10     Gait Training ( ):  Gait training to improve and/or restore physical functioning as related to mobility, balance and coordination. Ambulated 240 Feet (ft) with Stand-by assistance;Contact guard assistance using a Walker, rolling and minimal Safety awareness training;Verbal cues related to their stance phase and stride length to promote proper body alignment. Therapeutic Exercise: (15 Minutes):  Exercises per grid below to improve mobility and strength. Required minimal verbal cues to promote proper body alignment. Progressed range and repetitions as indicated.       Date:  3/7/19 Date:   Date:     ACTIVITY/EXERCISE AM PM AM PM AM PM   GROUP THERAPY  []  []  []  []  []  []   Ankle Pumps 15        Quad Sets 15        Gluteal Sets 15        Hip ABd/ADduction 15        Straight Leg Raises 15        Knee Slides 15        Short Arc Quads 15        Long Arc Quads         Chair Slides                  B = bilateral; AA = active assistive; A = active; P = passive      Treatment/Session Assessment:     Response to Treatment:  Patient participated well. Moving very well with good demonstration of all exercises. Education:  [x] Home Exercises  [x] Fall Precautions  [] Hip Precautions [] D/C Instruction Review  [] Knee/Hip Prosthesis Review  [x] Walker Management/Safety [] Adaptive Equipment as Needed       Interdisciplinary Collaboration:   o Physical Therapist  o Registered Nurse    After treatment position/precautions:   o Up in chair  o Bed/Chair-wheels locked  o Caregiver at bedside  o Call light within reach    Compliance with Program/Exercises: Will assess as treatment progresses. Recommendations/Intent for next treatment session:  Treatment next visit will focus on increasing Ms. Malone's independence with bed mobility, transfers, gait training, strength/ROM exercises, modalities for pain, and patient education.       Total Treatment Duration:  PT Patient Time In/Time Out  Time In: 0915  Time Out: 1710 [facility],

## 2019-03-07 NOTE — PROGRESS NOTES
Problem: Self Care Deficits Care Plan (Adult)  Goal: *Acute Goals and Plan of Care (Insert Text)  GOALS:   DISCHARGE GOALS (in preparation for going home/rehab):  3 days  1. Ms. Abiodun Smith will perform one lower body dressing activity with minimal assistance required to demonstrate improved functional mobility and safety. GOAL MET 3/7/2019    2. Ms. Abiodun Smith will perform one lower body bathing activity with minimal assistance required to demonstrate improved functional mobility and safety. GOAL MET 3/7/2019    3. Ms. Abiodun Smith will perform toileting/toilet transfer with contact guard assistance to demonstrate improved functional mobility and safety. GOAL MET 3/7/2019    4. Ms. Abiodun Smith will perform shower transfer with contact guard assistance to demonstrate improved functional mobility and safety. GOAL MET 3/7/2019        JOINT CAMP OCCUPATIONAL THERAPY TKA: Daily Note, Discharge, Treatment Day: 1st and AM 3/7/2019  INPATIENT: Hospital Day: 2  [place] / Product Type: Medicare /      NAME/AGE/GENDER: Melodie Aldrich is a 72 y.o. female   PRIMARY DIAGNOSIS:  Primary osteoarthritis of right knee [M17.11]   Procedure(s) and Anesthesia Type:     * KNEE ARTHROPLASTY TOTAL/ RIGHT - Spinal (Right)  ICD-10: Treatment Diagnosis:    · Pain in Right Knee (M25.561)  · Stiffness of Right Knee, Not elsewhere classified (M25.661)  · Other lack of cordination (R27.8)      ASSESSMENT:     Ms. Abiodun Smith is s/p right TKA and presents with decreased weight bearing on right LE and decreased independence with functional mobility and activities of daily living as compared to baseline level of function and safety. Patient would benefit from skilled Occupational Therapy to maximize independence and safety with self-care task and functional mobility. Pt would also benefit from education on adaptive equipment and safety precautions in preparation for going home.  Patient transferred supine to sit and took steps to recliner. Educated on [employer], discharge plan and home safety recommendations. Answered all questions. OT to return later this morning for full ADL session. Patient has some impaired sensation in R hand md aware per patient report. 3/7/19 1018am.  Ms. Guevara is s/p right TKA and presents with decreased weight bearing on right LE and decreased independence with functional mobility and activities of daily living. Patient completed shower and dressing as charter below in ADL grid and is ambulating with rolling walker and CGA to stand by assist.  Patient has met 4/4 goals and plans to return home with good family support. Family able to provide patient with appropriate level of assistance at this time. OT reviewed safety precautions throughout session and therapy schedule for the remainder of today. Patient instructed to call for assistance when needing to get up from recliner and all needs in reach. Patient verbalized understanding of call light. This section established at most recent assessment   PROBLEM LIST (Impairments causing functional limitations):  1. Decreased Strength  2. Decreased ADL/Functional Activities  3. Decreased Transfer Abilities  4. Increased Pain  5. Increased Fatigue  6. Decreased Flexibility/Joint Mobility  7. Decreased Knowledge of Precautions   INTERVENTIONS PLANNED: (Benefits and precautions of occupational therapy have been discussed with the patient.)  1. Activities of daily living training  2. Adaptive equipment training  3. Balance training  4. Clothing management  5. Donning&doffing training  6. Theraputic activity     TREATMENT PLAN: Frequency/Duration: Follow patient 1 times to address above goals. Rehabilitation Potential For Stated Goals: Good     RECOMMENDED REHABILITATION/EQUIPMENT: (at time of discharge pending progress): Continue Skilled Therapy. OCCUPATIONAL PROFILE AND HISTORY:   History of Present Injury/Illness (Reason for Referral):   Pt presents this date s/p (right) TKA. Past Medical History/Comorbidities:   Ms. Marcell Yeh  has a past medical history of Anxiety, Arthritis, Depression, Diabetes mellitus ([facility] [address] Hypercholesterolemia, Hypertension, Morbid obesity ([facility] [address] Personal history of colonic polyps, Status post total knee replacement, right (3/6/2019), and Vitamin D deficiency. Ms. Marcell Yeh  has a past surgical history that includes hx ryan and bso; hx cyst removal; hx lap cholecystectomy; hx  section; hx colonoscopy (2012); and hx breast biopsy (Left). Social History/Living Environment:   Home Environment: Private residence  # Steps to Enter: 4  Rails to Enter: No  One/Two Story Residence: One story  Living Alone: No  Support Systems: Spouse/Significant Other/Partner  Patient Expects to be Discharged to[de-identified] Private residence  Current DME Used/Available at Home: Cane, straight, Raised toilet seat, Shower chair, Walker, rollator  Tub or Shower Type: Shower  Prior Level of Function/Work/Activity:  Mod I with st. cane     Number of Personal Factors/Comorbidities that affect the Plan of Care: Brief history (0):  LOW COMPLEXITY   ASSESSMENT OF OCCUPATIONAL PERFORMANCE[de-identified]   Most Recent Physical Functioning:   Balance  Sitting: Intact  Standing: With support; Without support       Gross Assessment  AROM: Within functional limits(L LE)  Strength: Within functional limits(L LE)            Coordination  Fine Motor Skills-Upper: Left Intact; Right Intact  Gross Motor Skills-Upper: Left Intact; Right Intact         Mental Status  Neurologic State: Alert; Appropriate for age  Orientation Level: Appropriate for age  Cognition: Appropriate decision making; Appropriate for age attention/concentration; Appropriate safety awareness; Follows commands  Perception: Appears intact  Perseveration: No perseveration noted  Safety/Judgement: Awareness of environment; Fall prevention          RLE Strength  R Hip Flexion: 2+  R Knee Flexion: 2+  R Knee Extension: 3-     Basic ADLs (From Assessment) Complex ADLs (From Assessment)   Basic ADL  Feeding: Supervision  Oral Facial Hygiene/Grooming: Supervision  Bathing: Moderate assistance  Type of Bath: Chlorhexidine (CHG), Shower  Upper Body Dressing: Supervision  Lower Body Dressing: Moderate assistance  Toileting: Minimum assistance     Grooming/Bathing/Dressing Activities of Daily Living   Grooming  Grooming Assistance: Supervision/set up  Washing Face: Supervision/set-up  Washing Hands: Supervision/set-up Cognitive Retraining  Safety/Judgement: Awareness of environment; Fall prevention   Upper Body Bathing  Bathing Assistance: Supervision/set-up  Position Performed: Seated in chair  Adaptive Equipment: Grab bar; Shower chair     Lower Body Bathing  Bathing Assistance: Minimum assistance  Perineal  : Stand-by assistance;Contact guard assistance  Position Performed: Seated in chair;Standing  Adaptive Equipment: Grab bar  Lower Body : Minimum assistance(min assist with drying feet)  Position Performed: Seated in chair;Standing  Adaptive Equipment: Grab bar; Shower chair Toileting  Toileting Assistance: Stand-by assistance;Contact guard assistance  Bladder Hygiene: Stand-by assistance;Contact guard assistance  Clothing Management: Contact guard assistance  Adaptive Equipment: Elevated seat   Upper Body Dressing Assistance  Dressing Assistance: Supervision/set-up  Bra: Supervision/set-up  Hospital Gown: Supervision/ set-up  Pullover Shirt: Supervision/set-up Functional Transfers  Bathroom Mobility: Stand-by assistance  Toilet Transfer : Contact guard assistance  Shower Transfer: Contact guard assistance   Lower Body Dressing Assistance  Dressing Assistance: Minimum assistance  Underpants: Stand-by assistance  Pants With Elastic Waist: Stand-by assistance  Socks: Minimum assistance Bed/Mat Mobility  Supine to Sit: Stand-by assistance;Contact guard assistance  Sit to Stand: Stand-by assistance  Bed to Chair: Stand-by assistance  Scooting: Contact guard assistance         Physical Skills Involved:  1. Range of Motion  2. Balance  3. Strength Cognitive Skills Affected (resulting in the inability to perform in a timely and safe manner): 1. none Psychosocial Skills Affected:  1. none   Number of elements that affect the Plan of Care: 1-3:  LOW COMPLEXITY   CLINICAL DECISION MAKIN[address] [place] [address] AM-PAC 6 Clicks   Daily Activity Inpatient Short Form  How much help from another person does the patient currently need. .. Total A Lot A Little None   1. Putting on and taking off regular lower body clothing? [] 1   [] 2   [x] 3   [] 4   2. Bathing (including washing, rinsing, drying)? [] 1   [] 2   [x] 3   [] 4   3. Toileting, which includes using toilet, bedpan or urinal?   [] 1   [] 2   [x] 3   [] 4   4. Putting on and taking off regular upper body clothing? [] 1   [] 2   [] 3   [x] 4   5. Taking care of personal grooming such as brushing teeth? [] 1   [] 2   [] 3   [x] 4   6. Eating meals? [] 1   [] 2   [] 3   [x] 4   © , Trustees of [address] [place] [address], under license to [employer]. All rights reserved     Score:  Initial: 19 Most Recent: 21 discharge 3/7/19    Interpretation of Tool:  Represents activities that are increasingly more difficult (i.e. Bed mobility, Transfers, Gait). ·     Use of outcome tool(s) and clinical judgement create a POC that gives a: LOW COMPLEXITY            TREATMENT:   (In addition to Assessment/Re-Assessment sessions the following treatments were rendered)     Pre-treatment Symptoms/Complaints:   Pain: Initial:   Pain Intensity 1: 0  Post Session:  0/10 rest, iceman inplace     Self Care: (32): Procedure(s) (per grid) utilized to improve and/or restore self-care/home management as related to dressing, bathing, toileting and grooming. Required minimal visual, verbal and tactile cueing to facilitate activities of daily living skills and compensatory activities.        Treatment/Session Assessment:     Response to Treatment:  Tolerated well, plans to go home tomorrow. Education:  [] Home Exercises  [x] Fall Precautions  [] Hip Precautions [] Going Home Video  [x] Knee/Hip Prosthesis Review  [x] Walker Management/Safety [x] Adaptive Equipment as Needed       Interdisciplinary Collaboration:   o Occupational Therapist  o Registered Nurse    After treatment position/precautions:   o Up in chair  o Bed/Chair-wheels locked  o Call light within reach  o RN notified  o Family at bedside     Compliance with Program/Exercises: Compliant all of the time. Recommendations/Intent for next treatment session:  Pt doing well all goals met and will do well at home with support from spouse. Patient will be discharged home with home health PT. No further Occupational Therapy warranted, will discharge Occupational Therapy services.         Total Treatment Duration:32  OT Patient Time In/Time Out  Time In: 3331  Time Out: Jovani [address], OT

## 2019-03-07 NOTE — PROGRESS NOTES
Problem: Mobility Impaired (Adult and Pediatric)  Goal: *Acute Goals and Plan of Care (Insert Text)  GOALS (1-4 days):  (1.)Ms. Manisha Williamson will move from supine to sit and sit to supine  in bed with SUPERVISION. (2.)Ms. Manisha Williamson will transfer from bed to chair and chair to bed with SUPERVISION using the least restrictive device. (3.)Ms. Malone will ambulate with SUPERVISION for 200 feet with the least restrictive device. (4.)Ms. Malone will ambulate up/down 4 steps without a railing with MINIMAL ASSIST with cane. (5.)Ms. Manisha Williamson will increase right knee ROM to 5°-80°.  ________________________________________________________________________________________________      PHYSICAL THERAPY [facility] tKa: Daily Note, PM 3/7/2019  INPATIENT: Hospital Day: 2  [place] / [place] / Product Type: Medicare /      NAME/AGE/GENDER: Dionte Wyman is a 72 y.o. female   PRIMARY DIAGNOSIS:  Primary osteoarthritis of right knee [M17.11]   Procedure(s) and Anesthesia Type:     * KNEE ARTHROPLASTY TOTAL/ RIGHT - Spinal (Right)  ICD-10: Treatment Diagnosis:    · Pain in Right Knee (M25.561)  · Stiffness of Right Knee, Not elsewhere classified (M25.661)  · Difficulty in walking, Not elsewhere classified (R26.2)      ASSESSMENT:     Ms. Manisha Williamson presents s/p R TKA. Patient demonstrates decreased R LE strength and ROM and decreased independence with mobility. Patient would benefit from therapy to address these deficits. She will return home at d/c with assist from her spouse. Patient participated well with group session. Complaint of hand pain on R with walker-reports she hurt it when pulling herself up in the bed last night. Good knee ROM-flexion and extension. This section established at most recent assessment   PROBLEM LIST (Impairments causing functional limitations):  1. Decreased Strength  2. Decreased ADL/Functional Activities  3. Decreased Transfer Abilities  4.  Decreased Ambulation Ability/Technique  5. Decreased Balance  6. Increased Pain  7. Decreased Flexibility/Joint Mobility  8. Edema/Girth  9. Decreased [place] with Home Exercise Program   INTERVENTIONS PLANNED: (Benefits and precautions of physical therapy have been discussed with the patient.)  1. Bed Mobility  2. Gait Training  3. Home Exercise Program (HEP)  4. Therapeutic Exercise/Strengthening  5. Transfer Training  6. Range of Motion: active/assisted/passive  7. Therapeutic Activities  8. Group Therapy     TREATMENT PLAN: Frequency/Duration: Follow patient BID for duration of hospital stay to address above goals. Rehabilitation Potential For Stated Goals: Good     RECOMMENDED REHABILITATION/EQUIPMENT: (at time of discharge pending progress): Continue Skilled Therapy and Home Health: Physical Therapy. HISTORY:   History of Present Injury/Illness (Reason for Referral):  R TKA  Past Medical History/Comorbidities:   Ms. Hillary Agudelo  has a past medical history of Anxiety, Arthritis, Depression, Diabetes mellitus ([facility] [address] Hypercholesterolemia, Hypertension, Morbid obesity (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 Ms. Hillary Agudelo  has a past surgical history that includes hx ryan and bso; hx cyst removal; hx lap cholecystectomy; hx  section; hx colonoscopy (2012); and hx breast biopsy (Left).   Social History/Living Environment:   Home Environment: Private residence  # Steps to Enter: 4  Rails to Enter: No  One/Two Story Residence: One story  Living Alone: No  Support Systems: Spouse/Significant Other/Partner  Patient Expects to be Discharged to[de-identified] Private residence  Current DME Used/Available at Home: Cane, straight, Raised toilet seat, Shower chair, Walker, rollator  Tub or Shower Type: Shower  Prior Level of Function/Work/Activity:  Independent   Number of Personal Factors/Comorbidities that affect the Plan of Care: 0: LOW COMPLEXITY EXAMINATION:   Most Recent Physical Functioning:      Gross Assessment  AROM: Within functional limits(L LE)  Strength: Within functional limits(L LE)        RLE AROM  R Knee Flexion: 79  R Knee Extension: 0       RLE Strength  R Hip Flexion: 2+  R Knee Flexion: 2+  R Knee Extension: 3-    Bed Mobility  Supine to Sit: Stand-by assistance;Contact guard assistance  Scooting: Contact guard assistance    Transfers  Sit to Stand: Stand-by assistance  Stand to Sit: Stand-by assistance  Bed to Chair: Stand-by assistance    Balance  Sitting: Intact  Standing: With support              Weight Bearing Status  Right Side Weight Bearing: As tolerated  Distance (ft): 234 Feet (ft)  Ambulation - Level of Assistance: Stand-by assistance  Assistive Device: Walker, rolling  Speed/[place]ashaun: Pace decreased (<100 feet/min)  Step Length: Left shortened;Right shortened  Stance: Right decreased  Gait Abnormalities: Antalgic  Interventions: Safety awareness training;Verbal cues     Braces/Orthotics: none    Right Knee Cold  Type: Cryocuff      Body Structures Involved:  1. Joints  2. Muscles Body Functions Affected:  1. Movement Related Activities and Participation Affected:  1. Mobility  2. Self Care   Number of elements that affect the Plan of Care: 4+: HIGH COMPLEXITY   CLINICAL PRESENTATION:   Presentation: Stable and uncomplicated: LOW COMPLEXITY   CLINICAL DECISION MAKIN [facility] [address] [id number] AM-PAC 6 Clicks   Basic Mobility Inpatient Short Form  How much difficulty does the patient currently have. .. Unable A Lot A Little None   1. Turning over in bed (including adjusting bedclothes, sheets and blankets)? [] 1   [] 2   [x] 3   [] 4   2. Sitting down on and standing up from a chair with arms ( e.g., wheelchair, bedside commode, etc.)   [] 1   [] 2   [x] 3   [] 4   3. Moving from lying on back to sitting on the side of the bed? [] 1   [] 2   [x] 3   [] 4   How much help from another person does the patient currently need. .. Total A Lot A Little None   4. Moving to and from a bed to a chair (including a wheelchair)? [] 1   [] 2   [x] 3   [] 4   5. Need to walk in hospital room? [] 1   [] 2   [x] 3   [] 4   6. Climbing 3-5 steps with a railing? [] 1   [] 2   [x] 3   [] 4   © 2007, Trustees of [facility] [employer], under license to [employer]. All rights reserved        Score:  Initial: 18 Most Recent: X (Date: -- )    Interpretation of Tool:  Represents activities that are increasingly more difficult (i.e. Bed mobility, Transfers, Gait). Medical Necessity:     · Patient is expected to demonstrate progress in strength, range of motion, balance and coordination to increase independence with mobility and ADLs. Reason for Services/Other Comments:  · Patient continues to require skilled intervention due to decreased R LE strength and ROM and decreased independence with mobility s/p TKA. Use of outcome tool(s) and clinical judgement create a POC that gives a: Clear prediction of patient's progress: LOW COMPLEXITY            TREATMENT:   (In addition to Assessment/Re-Assessment sessions the following treatments were rendered)     Pre-treatment Symptoms/Complaints:  Patient ready for group session. Pain: Initial:   Pain Intensity 1: 3  Pain Location 1: Knee  Pain Orientation 1: Right  Pain Intervention(s) 1: Ambulation/Increased Activity, Cold pack, Exercise  Post Session:  3/10     Gait Training (15 Minutes):  Gait training to improve and/or restore physical functioning as related to mobility, balance and coordination. Ambulated 234 Feet (ft) with Stand-by assistance using a Walker, rolling and minimal Safety awareness training;Verbal cues related to their stance phase and stride length to promote proper body alignment. Therapeutic Exercise: (45 Minutes(group)):  Exercises per grid below to improve mobility and strength. Required minimal verbal cues to promote proper body alignment.   Progressed range and repetitions as indicated. Date:  3/7/19 Date:   Date:     ACTIVITY/EXERCISE AM PM AM PM AM PM   GROUP THERAPY  []  []  []  []  []  []   Ankle Pumps 15 15       Quad Sets 15 15       Gluteal Sets 15 15       Hip ABd/ADduction 15 15       Straight Leg Raises 15 15       Knee Slides 15 15       Short Arc Quads 15 15       Long Arc Quads         Chair Slides  15                B = bilateral; AA = active assistive; A = active; P = passive      Treatment/Session Assessment:     Response to Treatment:  Patient participated well with group session. Complaint of R hand pain more than knee pain. Education:  [x] Home Exercises  [x] Fall Precautions  [] Hip Precautions [] D/C Instruction Review  [x] Knee/Hip Prosthesis Review  [x] Walker Management/Safety [] Adaptive Equipment as Needed       Interdisciplinary Collaboration:   o Physical Therapist  o Registered Nurse  o Rehabilitation Attendant    After treatment position/precautions:   o Up in chair  o Bed/Chair-wheels locked  o Caregiver at bedside  o Call light within reach    Compliance with Program/Exercises: Will assess as treatment progresses. Recommendations/Intent for next treatment session:  Treatment next visit will focus on increasing Ms. Faisal's independence with bed mobility, transfers, gait training, strength/ROM exercises, modalities for pain, and patient education.       Total Treatment Duration:  PT Patient Time In/Time Out  Time In: 1300  Time Out: [address] [facility] [place]

## 2019-03-07 NOTE — PROGRESS NOTES
Shift assessment completed. Pt is alert & oriented x4. Able to verbalize needs. Resting quietly with no distress noted. Dressing to right knee is clean, dry and intact. Golden Overall in place. Neurovascular and peripheral vascular checks WNL. Patient is able to dorsi/planter flex bilaterally with +2 pedal pulses. Velasquez draining clear yellow urine to bag. Incentive spirometry at bedside. Patient encouraged to use hourly x 10 repetitions. Pain is being managed with Dilaudid, patient tolerating well. Bed locked and lowered. Call light within reach. Patient instructed to call for assistance. Pt verbalizes understanding. Will monitor.

## 2019-03-07 NOTE — PROGRESS NOTES
Follow up from initial assessment    SOB evaluated:None noted    Breath Sounds: Clear    IS patient effort:Good  Patient achieved:2500 Ml/sec    Patient placed on continuous monitor QHS.

## 2019-03-07 NOTE — PROGRESS NOTES
Got up to recliner with therapy assisting. NV checks WDL. Aquacel dry and intact to R knee with iceman. No complaints.

## 2019-03-07 NOTE — PROGRESS NOTES
Remains in recliner. Has had ice pack to R hand at times today, has some pain but no swelling. iceman to knee. NV checks WDL. Medicated for pain with dilaudid po and tylenol again.  in room.

## 2019-03-07 NOTE — PROGRESS NOTES
Problem: Self Care Deficits Care Plan (Adult)  Goal: *Acute Goals and Plan of Care (Insert Text)  GOALS:   DISCHARGE GOALS (in preparation for going home/rehab):  3 days  1. Ms. Miriam Cordero will perform one lower body dressing activity with minimal assistance required to demonstrate improved functional mobility and safety. 2.  Ms. Miriam Cordero will perform one lower body bathing activity with minimal assistance required to demonstrate improved functional mobility and safety. 3.  Ms. Miriam Cordero will perform toileting/toilet transfer with contact guard assistance to demonstrate improved functional mobility and safety. 4.  Ms. Miriam Cordero will perform shower transfer with contact guard assistance to demonstrate improved functional mobility and safety. JOINT CAMP OCCUPATIONAL THERAPY TKA: Initial Assessment and AM 3/7/2019  INPATIENT: Hospital Day: 2  [place] / Product Type: Medicare /      NAME/AGE/GENDER: Kary Matos is a 72 y.o. female   PRIMARY DIAGNOSIS:  Primary osteoarthritis of right knee [M17.11]   Procedure(s) and Anesthesia Type:     * KNEE ARTHROPLASTY TOTAL/ RIGHT - Spinal (Right)  ICD-10: Treatment Diagnosis:    · Pain in Right Knee (M25.561)  · Stiffness of Right Knee, Not elsewhere classified (M25.661)  · Other lack of cordination (R27.8)      ASSESSMENT:     Ms. Miriam Cordero is s/p right TKA and presents with decreased weight bearing on right LE and decreased independence with functional mobility and activities of daily living as compared to baseline level of function and safety. Patient would benefit from skilled Occupational Therapy to maximize independence and safety with self-care task and functional mobility. Pt would also benefit from education on adaptive equipment and safety precautions in preparation for going home. Patient transferred supine to sit and took steps to recliner. Educated on [employer], discharge plan and home safety recommendations.  Answered all questions. OT to return later this morning for full ADL session. Patient has some impaired sensation in R hand md aware per patient report. This section established at most recent assessment   PROBLEM LIST (Impairments causing functional limitations):  1. Decreased Strength  2. Decreased ADL/Functional Activities  3. Decreased Transfer Abilities  4. Increased Pain  5. Increased Fatigue  6. Decreased Flexibility/Joint Mobility  7. Decreased Knowledge of Precautions   INTERVENTIONS PLANNED: (Benefits and precautions of occupational therapy have been discussed with the patient.)  1. Activities of daily living training  2. Adaptive equipment training  3. Balance training  4. Clothing management  5. Donning&doffing training  6. Theraputic activity     TREATMENT PLAN: Frequency/Duration: Follow patient 1 times to address above goals. Rehabilitation Potential For Stated Goals: Good     RECOMMENDED REHABILITATION/EQUIPMENT: (at time of discharge pending progress): Continue Skilled Therapy. OCCUPATIONAL PROFILE AND HISTORY:   History of Present Injury/Illness (Reason for Referral): Pt presents this date s/p (right) TKA. Past Medical History/Comorbidities:   Ms. 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 Ms. Bonnie Prakash  has a past surgical history that includes hx ryan and bso; hx cyst removal; hx lap cholecystectomy; hx  section; hx colonoscopy (2012); and hx breast biopsy (Left).   Social History/Living Environment:   Home Environment: Private residence  # Steps to Enter: 3  One/Two Story Residence: One story  Living Alone: No  Support Systems: Family member(s)  Patient Expects to be Discharged to[de-identified] Private residence  Current DME Used/Available at Home: Cane, straight, Raised toilet seat, Shower chair  Tub or Shower Type: Shower  Prior Level of Function/Work/Activity:  Mod I with st. cane     Number of Personal Factors/Comorbidities that affect the Plan of Care: Brief history (0):  LOW COMPLEXITY   ASSESSMENT OF OCCUPATIONAL PERFORMANCE[de-identified]   Most Recent Physical Functioning:   Balance  Sitting: Intact  Standing: Pull to stand; With support                    Coordination  Fine Motor Skills-Upper: Left Intact; Right Intact  Gross Motor Skills-Upper: Left Intact; Right Intact         Mental Status  Neurologic State: Alert; Appropriate for age  Orientation Level: Appropriate for age  Cognition: Appropriate decision making; Appropriate for age attention/concentration; Appropriate safety awareness; Follows commands  Perception: Appears intact  Perseveration: No perseveration noted  Safety/Judgement: Awareness of environment; Fall prevention                Basic ADLs (From Assessment) Complex ADLs (From Assessment)   Basic ADL  Feeding: Supervision  Oral Facial Hygiene/Grooming: Supervision  Bathing: Moderate assistance  Upper Body Dressing: Supervision  Lower Body Dressing: Moderate assistance  Toileting: Minimum assistance     Grooming/Bathing/Dressing Activities of Daily Living     Cognitive Retraining  Safety/Judgement: Awareness of environment; Fall prevention                 Functional Transfers  Toilet Transfer : Minimum assistance  Shower Transfer: Minimum assistance     Bed/Mat Mobility  Supine to Sit: Stand-by assistance;Contact guard assistance  Sit to Stand: Minimum assistance  Bed to Chair: Minimum assistance  Scooting: Contact guard assistance         Physical Skills Involved:  1. Range of Motion  2. Balance  3. Strength Cognitive Skills Affected (resulting in the inability to perform in a timely and safe manner):   1. none Psychosocial Skills Affected:  1. none   Number of elements that affect the Plan of Care: 1-3:  LOW COMPLEXITY   CLINICAL DECISION MAKING:   [employer] AM-PAC 7 Clicks   Daily Activity Inpatient Short Form  How much help from another person does the patient currently need. .. Total A Lot A Little None   1. Putting on and taking off regular lower body clothing? [] 1   [x] 2   [] 3   [] 4   2. Bathing (including washing, rinsing, drying)? [] 1   [x] 2   [] 3   [] 4   3. Toileting, which includes using toilet, bedpan or urinal?   [] 1   [] 2   [x] 3   [] 4   4. Putting on and taking off regular upper body clothing? [] 1   [] 2   [] 3   [x] 4   5. Taking care of personal grooming such as brushing teeth? [] 1   [] 2   [] 3   [x] 4   6. Eating meals? [] 1   [] 2   [] 3   [x] 4   © 2007, Trustees of [address] [place] [address], under license to [employer]. All rights reserved     Score:  Initial: 19 Most Recent: X (Date: -- )    Interpretation of Tool:  Represents activities that are increasingly more difficult (i.e. Bed mobility, Transfers, Gait). Medical Necessity:     · Patient is expected to demonstrate progress in balance, coordination and functional technique to decrease assistance required with self care and functional mobiltiy and improve safety during self care and functional mobiltiy. Reason for Services/Other Comments:  · Patient continues to require skilled intervention due to s/p R TKA. Use of outcome tool(s) and clinical judgement create a POC that gives a: LOW COMPLEXITY            TREATMENT:   (In addition to Assessment/Re-Assessment sessions the following treatments were rendered)     Pre-treatment Symptoms/Complaints:   Pain: Initial:   Pain Intensity 1: 0  Post Session:  0/10 rest, iceman inplace     Self Care: (16): Procedure(s) (per grid) utilized to improve and/or restore self-care/home management as related to dressing, bathing, toileting, grooming and home safety and OT poc. Required minimal visual, verbal and tactile cueing to facilitate activities of daily living skills and compensatory activities.   Assessment/Reassessment (10)    Treatment/Session Assessment:     Response to Treatment:  Tolerated well, plans to go home tomorrow. Education:  [] Home Exercises  [x] Fall Precautions  [] Hip Precautions [] Going Home Video  [x] Knee/Hip Prosthesis Review  [x] Walker Management/Safety [x] Adaptive Equipment as Needed       Interdisciplinary Collaboration:   o Occupational Therapist  o Registered Nurse    After treatment position/precautions:   o Up in chair  o Bed/Chair-wheels locked  o Call light within reach  o RN notified  o Family at bedside     Compliance with Program/Exercises: Compliant all of the time. Recommendations/Intent for next treatment session:  Treatment next visit will focus on increasing Ms. Malone's independence with bed mobility, transfers, self care, functional mobility, modalities for pain, and patient education.       Total Treatment Duration:16  OT Patient Time In/Time Out  Time In: 3706  Time Out: 17[address] [facility],

## 2019-03-07 NOTE — INTERDISCIPLINARY ROUNDS
Interdisciplinary team rounds were held 3/7/2019 with the following team members:Care Management, Nursing, Occupational Therapy and Physical Therapy and the patient. Plan of Care options were discussed with the team and the patient. The patient would benefit from a screening and/or visit from [facility].

## 2019-03-08 VITALS
HEART RATE: 67 BPM | TEMPERATURE: 97.9 F | WEIGHT: 207.01 LBS | RESPIRATION RATE: 18 BRPM | OXYGEN SATURATION: 95 % | HEIGHT: 60 IN | DIASTOLIC BLOOD PRESSURE: 77 MMHG | BODY MASS INDEX: 40.64 KG/M2 | SYSTOLIC BLOOD PRESSURE: 130 MMHG

## 2019-03-08 LAB
GLUCOSE BLD STRIP.AUTO-MCNC: 126 MG/DL (ref 65–100)
HGB BLD-MCNC: 9.5 G/DL (ref 11.7–15.4)
MM INDURATION POC: 0 MM (ref 0–5)
PPD POC: NORMAL NEGATIVE

## 2019-03-08 PROCEDURE — 97116 GAIT TRAINING THERAPY: CPT

## 2019-03-08 PROCEDURE — 97150 GROUP THERAPEUTIC PROCEDURES: CPT

## 2019-03-08 PROCEDURE — 82962 GLUCOSE BLOOD TEST: CPT

## 2019-03-08 PROCEDURE — 74011250637 HC RX REV CODE- 250/637: Performed by: PHYSICIAN ASSISTANT

## 2019-03-08 PROCEDURE — 85018 HEMOGLOBIN: CPT

## 2019-03-08 PROCEDURE — 74011250636 HC RX REV CODE- 250/636: Performed by: PHYSICIAN ASSISTANT

## 2019-03-08 PROCEDURE — 36415 COLL VENOUS BLD VENIPUNCTURE: CPT

## 2019-03-08 RX ADMIN — HYDROMORPHONE HYDROCHLORIDE 2 MG: 2 TABLET ORAL at 11:38

## 2019-03-08 RX ADMIN — ASPIRIN 81 MG: 81 TABLET ORAL at 08:01

## 2019-03-08 RX ADMIN — SENNOSIDES AND DOCUSATE SODIUM 2 TABLET: 8.6; 5 TABLET ORAL at 08:01

## 2019-03-08 RX ADMIN — ENOXAPARIN SODIUM 40 MG: 40 INJECTION SUBCUTANEOUS at 08:00

## 2019-03-08 RX ADMIN — ACETAMINOPHEN 1000 MG: 500 TABLET, FILM COATED ORAL at 00:07

## 2019-03-08 RX ADMIN — CELECOXIB 200 MG: 200 CAPSULE ORAL at 08:01

## 2019-03-08 RX ADMIN — ACETAMINOPHEN 1000 MG: 500 TABLET, FILM COATED ORAL at 11:38

## 2019-03-08 RX ADMIN — ACETAMINOPHEN 1000 MG: 500 TABLET, FILM COATED ORAL at 06:33

## 2019-03-08 RX ADMIN — Medication 1 AMPULE: at 08:01

## 2019-03-08 RX ADMIN — HYDROMORPHONE HYDROCHLORIDE 2 MG: 2 TABLET ORAL at 06:33

## 2019-03-08 RX ADMIN — HYDROMORPHONE HYDROCHLORIDE 2 MG: 2 TABLET ORAL at 02:04

## 2019-03-08 NOTE — PROGRESS NOTES
Shift assessment completed. Pt is alert & oriented x4. Able to verbalize needs. Resting quietly with no distress noted. Dressing to right knee is clean, dry and intact. Catheryn Parrot in place. Neurovascular and peripheral vascular checks WNL. Incentive Spirometry at bedside. Patient encouraged to use hourly x 10 repetitions. Patient voiding clear yellow urine without difficulty. Pain is being managed with Dilaudid with patient tolerating well. Bed low and locked. Call light within reach. Patient instructed to call for assistance. Pt verbalizes understanding. Will monitor.

## 2019-03-08 NOTE — PROGRESS NOTES
Sitting up in bed. Aquacel to R knee intact with scant drainage. Iceman to knee. NV checks WDL. No complaints.  in room.

## 2019-03-08 NOTE — PROGRESS NOTES
Problem: Mobility Impaired (Adult and Pediatric)  Goal: *Acute Goals and Plan of Care (Insert Text)  GOALS (1-4 days):  (1.)Ms. Conchita Bence will move from supine to sit and sit to supine  in bed with SUPERVISION. (2.)Ms. Conchita Bence will transfer from bed to chair and chair to bed with SUPERVISION using the least restrictive device. (3.)Ms. Malone will ambulate with SUPERVISION for 200 feet with the least restrictive device. (4.)Ms. Malone will ambulate up/down 4 steps without a railing with MINIMAL ASSIST with cane. (5.)Ms. Conchita Bence will increase right knee ROM to 5°-80°.  ________________________________________________________________________________________________      PHYSICAL THERAPY [facility]: Daily Note, AM 3/8/2019  INPATIENT: Hospital Day: 3  [place] / [place] / Product Type: Medicare /      NAME/AGE/GENDER: Jeison Billy is a 72 y.o. female   PRIMARY DIAGNOSIS:  Primary osteoarthritis of right knee [M17.11]   Procedure(s) and Anesthesia Type:     * KNEE ARTHROPLASTY TOTAL/ RIGHT - Spinal (Right)  ICD-10: Treatment Diagnosis:    · Pain in Right Knee (M25.561)  · Stiffness of Right Knee, Not elsewhere classified (M25.661)  · Difficulty in walking, Not elsewhere classified (R26.2)      ASSESSMENT:     Ms. Conchita Bence presents s/p R TKA. Patient demonstrates decreased R LE strength and ROM and decreased independence with mobility. Patient would benefit from therapy to address these deficits. She will return home at d/c with assist from her spouse. Patient supine on contact and agreeable to therapy. Worked on bed mobility, sit to stand and gait training in the stubbs. In gym practiced stairs, went up and down 3 twice with verbal cues. Both pt and spouse feel good with the stairs. Then worked on TKA exercises with verbal cues progressing with repetitions. Reviewed HEP along with frequency and use of ice. Pt rolled back to her room in chair, ice in place and needs in reach.  Pt plans to go home today, HHPT to follow up. This section established at most recent assessment   PROBLEM LIST (Impairments causing functional limitations):  1. Decreased Strength  2. Decreased ADL/Functional Activities  3. Decreased Transfer Abilities  4. Decreased Ambulation Ability/Technique  5. Decreased Balance  6. Increased Pain  7. Decreased Flexibility/Joint Mobility  8. Edema/Girth  9. Decreased [place] with Home Exercise Program   INTERVENTIONS PLANNED: (Benefits and precautions of physical therapy have been discussed with the patient.)  1. Bed Mobility  2. Gait Training  3. Home Exercise Program (HEP)  4. Therapeutic Exercise/Strengthening  5. Transfer Training  6. Range of Motion: active/assisted/passive  7. Therapeutic Activities  8. Group Therapy     TREATMENT PLAN: Frequency/Duration: Follow patient BID for duration of hospital stay to address above goals. Rehabilitation Potential For Stated Goals: Good     RECOMMENDED REHABILITATION/EQUIPMENT: (at time of discharge pending progress): Continue Skilled Therapy and Home Health: Physical Therapy. HISTORY:   History of Present Injury/Illness (Reason for Referral):  R TKA  Past Medical History/Comorbidities:   Ms. 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 Ms. Eli Ro  has a past surgical history that includes hx ryan and bso; hx cyst removal; hx lap cholecystectomy; hx  section; hx colonoscopy (2012); and hx breast biopsy (Left).   Social History/Living Environment:   Home Environment: Private residence  # Steps to Enter: 4  Rails to Enter: No  One/Two Story Residence: One story  Living Alone: No  Support Systems: Spouse/Significant Other/Partner  Patient Expects to be Discharged to[de-identified] Private residence  Current DME Used/Available at Home: Cane, straight, Raised toilet seat, Shower chair, Walker, rollator  Tub or Shower Type: Shower  Prior Level of Function/Work/Activity:  Independent   Number of Personal Factors/Comorbidities that affect the Plan of Care: 0: LOW COMPLEXITY   EXAMINATION:   Most Recent Physical Functioning:               RLE AROM  R Knee Flexion: 77  R Knee Extension: 3            Bed Mobility  Supine to Sit: Supervision;Stand-by assistance  Sit to Supine: (stayed up in chair)    Transfers  Sit to Stand: Stand-by assistance  Stand to Sit: Stand-by assistance    Balance  Sitting: Intact  Standing: Intact; With support         Gait Training: Yes    Weight Bearing Status  Right Side Weight Bearing: As tolerated  Distance (ft): 234 Feet (ft)  Ambulation - Level of Assistance: Stand-by assistance  Assistive Device: Walker, rolling  Speed/[place]: Pace decreased (<100 feet/min)  Step Length: Left shortened  Stance: Right decreased  Gait Abnormalities: Antalgic  Number of Stairs Trained: 3(up and down 3 twice)  Stairs - Level of Assistance: Contact guard assistance  Rail Use: Both  Interventions: Safety awareness training;Verbal cues     Braces/Orthotics: none    Right Knee Cold  Type: Cryocuff      Body Structures Involved:  1. Joints  2. Muscles Body Functions Affected:  1. Movement Related Activities and Participation Affected:  1. Mobility  2. Self Care   Number of elements that affect the Plan of Care: 4+: HIGH COMPLEXITY   CLINICAL PRESENTATION:   Presentation: Stable and uncomplicated: LOW COMPLEXITY   CLINICAL DECISION MAKING:   [employer] 6 Clicks   Basic Mobility Inpatient Short Form  How much difficulty does the patient currently have. .. Unable A Lot A Little None   1. Turning over in bed (including adjusting bedclothes, sheets and blankets)? [] 1   [] 2   [x] 3   [] 4   2.   Sitting down on and standing up from a chair with arms ( e.g., wheelchair, bedside commode, etc.)   [] 1   [] 2 [x] 3   [] 4   3. Moving from lying on back to sitting on the side of the bed? [] 1   [] 2   [x] 3   [] 4   How much help from another person does the patient currently need. .. Total A Lot A Little None   4. Moving to and from a bed to a chair (including a wheelchair)? [] 1   [] 2   [x] 3   [] 4   5. Need to walk in hospital room? [] 1   [] 2   [x] 3   [] 4   6. Climbing 3-5 steps with a railing? [] 1   [] 2   [x] 3   [] 4   © 2007, Trustees of [address] [place] [address] [facility], under license to [employer]. All rights reserved        Score:  Initial: 18 Most Recent: X (Date: -- )    Interpretation of Tool:  Represents activities that are increasingly more difficult (i.e. Bed mobility, Transfers, Gait). Medical Necessity:     · Patient is expected to demonstrate progress in strength, range of motion, balance and coordination to increase independence with mobility and ADLs. Reason for Services/Other Comments:  · Patient continues to require skilled intervention due to decreased R LE strength and ROM and decreased independence with mobility s/p TKA. Use of outcome tool(s) and clinical judgement create a POC that gives a: Clear prediction of patient's progress: LOW COMPLEXITY            TREATMENT:   (In addition to Assessment/Re-Assessment sessions the following treatments were rendered)     Pre-treatment Symptoms/Complaints:  Patient ready for group session. Pain: Initial:      Post Session:  3/10     Gait Training (15 Minutes):  Gait training to improve and/or restore physical functioning as related to mobility, balance and coordination. Ambulated 234 Feet (ft) with Stand-by assistance using a Walker, rolling and minimal Safety awareness training;Verbal cues related to their stance phase and stride length to promote proper body alignment. Therapeutic Exercise: (30 Minutes(group)):  Exercises per grid below to improve mobility and strength. Required minimal verbal cues to promote proper body alignment. Progressed range and repetitions as indicated. Date:  3/7/19 Date:  3/8/19 Date:     ACTIVITY/EXERCISE AM PM AM PM AM PM   GROUP THERAPY  []  []  [x]  []  []  []   Ankle Pumps 15 15 20      Quad Sets 15 15 20      Gluteal Sets 15 15 20      Hip ABd/ADduction 15 15 20      Straight Leg Raises 15 15 20      Knee Slides 15 15 20      Short Arc Quads 15 15 20      Long Arc Quads         Chair Slides  15 20               B = bilateral; AA = active assistive; A = active; P = passive      Treatment/Session Assessment:     Response to Treatment:  Patient tolerated well, did well with stairs     Education:  [x] Home Exercises  [x] Fall Precautions   [] D/C Instruction Review  [x] Knee/Hip Prosthesis Review  [x] Walker Management/Safety [] Adaptive Equipment as Needed       Interdisciplinary Collaboration:   o Registered Nurse  o Rehabilitation Attendant    After treatment position/precautions:   o Up in chair  o Bed/Chair-wheels locked  o Caregiver at bedside  o Call light within reach  o Family at bedside    Compliance with Program/Exercises: Compliant all of the time. Recommendations/Intent for next treatment session:  Treatment next visit will focus on increasing Ms. Faisal's independence with bed mobility, transfers, gait training, strength/ROM exercises, modalities for pain, and patient education.       Total Treatment Duration:  PT Patient Time In/Time Out  Time In: 0930  Time Out: [id number] PT

## 2019-03-08 NOTE — DISCHARGE INSTRUCTIONS
[id number] [facility]   Patient Discharge Instructions    Idris Snider / [id number] : 1945    Admitted 3/6/2019 Discharged: 3/6/2019     IF YOU HAVE ANY PROBLEMS ONCE YOU ARE AT HOME CALL THE FOLLOWING NUMBERS:   Main office number: [phone]      Medications    · The medications you are to continue on are listed on the medication reconciliation sheet. · Narcotic pain medications as well as supplemental iron can cause constipation. If this occurs try stopping the narcotic pain medication and/or the iron. · It is important that you take the medication exactly as they are prescribed. · Medications which increase your risk of blood clots are listed to stop for 5 weeks after surgery as well as medications or supplements which increase your risk of bleeding complications. · Keep your medication in the bottles provided by the pharmacist and keep a list of the medication names, dosages, and times to be taken in your wallet. · Do not take other medications without consulting your doctor. Important Information    Do NOT smoke as this will greatly increase your risk of infection! Resume your prehospital diet. If you have excessive nausea or vomitting call your doctor's office     Leg swelling and warmth is normal for 6 months after surgery. If you experience swelling in your leg elevate you leg while laying down with your toes above your heart. If you have sudden onset severe swelling with leg pain call our office. The stitches deep inside take approximately 6 months to dissolve. There will be sharp shooting, stinging and burning pain. This is normal and will resolve between 3-6 months after surgery. Difficulty sleeping is normal following total Knee and Hip replacement. You may try melatonin, an over-the-counter sleep aid or benadryl to help with sleep. Most patients will resume sleeping through the night 8 weeks after surgery. Home Physical Therapy is arranged.  Home [facility] will contact you within 48 hrs of discharge that you have chosen. If you have not received a call within this time frame please contact that provider you chose. You should be given this information before you leave the hospital.     You are at a risk for falls. Use the rolling walker when walking. Patients who have had a joint replacement should not drive if they are still taking narcotic pain mediation during the daytime hours. Most patients wean themselves off of pain medication within 2-5 weeks after surgery. When to Call the office    - If you have a temperature greater then 101  - Uncontrolled vomiting   - Loose control of your bladder or bowel function  - Are unable to bear any weight   - Need a pain medication refill       DISCHARGE SUMMARY from Nurse    The following personal items collected during your admission are returned to you:   Dental Appliance: Dental Appliances: None  Vision: Visual Aid: Glasses  Hearing Aid:   na  Jewelry: Jewelry: None  Clothing: Clothing: Other (comment)  Other Valuables: Other Valuables: Cell Phone, Wallet(PT'S  HAS VALUABLES)  Valuables sent to safe:   na    PATIENT INSTRUCTIONS:    After general anesthesia or intravenous sedation, for 24 hours or while taking prescription Narcotics:  · Limit your activities  · Do not drive and operate hazardous machinery  · Do not make important personal or business decisions  · Do  not drink alcoholic beverages  · If you have not urinated within 8 hours after discharge, please contact your surgeon on call.     Report the following to your surgeon:  · Excessive pain, swelling, redness or odor of or around the surgical area  · Temperature over 101  · Nausea and vomiting lasting longer than 4 hours or if unable to take medications  · Any signs of decreased circulation or nerve impairment to extremity: change in color, persistent  numbness, tingling, coldness or increase pain  · Any questions, call office @ [phone]      Keep scheduled follow up appointment. If need to change, call office @ [phone]. *  Please give a list of your current medications to your Primary Care Provider. *  Please update this list whenever your medications are discontinued, doses are      changed, or new medications (including over-the-counter products) are added. *  Please carry medication information at all times in case of emergency situations. Patient Education        Total Knee Replacement: What to Expect at Home  Your Recovery    When you leave the hospital, you should be able to move around with a walker or crutches. But you will need someone to help you at home for the next few weeks or until you have more energy and can move around better. If there is no one to help you at home, you may go to a rehabilitation center. You will go home with a bandage and stitches, staples, tissue glue, or tape strips. Change the bandage as your doctor tells you to. If you have stitches or staples, your doctor will remove them 10 to 21 days after your surgery. Glue or tape strips will fall off on their own over time. You may still have some mild pain, and the area may be swollen for 3 to 6 months after surgery. Your knee will continue to improve for 6 to 12 months. You will probably use a walker for 1 to 3 weeks and then use crutches. When you are ready, you can use a cane. You will probably be able to walk on your own in 4 to 8 weeks. You will need to do months of physical rehabilitation (rehab) after a knee replacement. Rehab will help you strengthen the muscles of the knee and help you regain movement. After you recover, your artificial knee will allow you to do normal daily activities with less pain or no pain at all. You may be able to hike, dance, ride a bike, and play golf. Talk to your doctor about whether you can do more strenuous activities. Always tell your caregivers that you have an artificial knee.   How long it will take to walk on your own, return to normal activities, and go back to work depends on your health and how well your rehabilitation (rehab) program goes. The better you do with your rehab exercises, the quicker you will get your strength and movement back. This care sheet gives you a general idea about how long it will take for you to recover. But each person recovers at a different pace. Follow the steps below to get better as quickly as possible. How can you care for yourself at home? Activity    · Rest when you feel tired. You may take a nap, but do not stay in bed all day. When you sit, use a chair with arms. You can use the arms to help you stand up.     · Work with your physical therapist to find the best way to exercise. You may be able to take frequent, short walks using crutches or a walker. What you can do as your knee heals will depend on whether your new knee is cemented or uncemented. You may not be able to do certain things for a while if your new knee is uncemented.     · After your knee has healed enough, you can do more strenuous activities with caution. ? You can golf, but use a golf cart, and do not wear shoes with spikes. ? You can bike on a flat road or on a stationary bike. Avoid biking up hills. ? Your doctor may suggest that you stay away from activities that put stress on your knee. These include tennis or badminton, squash or racquetball, contact sports like football, jumping (such as in basketball), jogging, or running. ? Avoid activities where you might fall. These include horseback riding, skiing, and mountain biking.     · Do not sit for more than 1 hour at a time. Get up and walk around for a while before you sit again. If you must sit for a long time, prop up your leg with a chair or footstool. This will help you avoid swelling.     · Ask your doctor when you can drive again.  It may take up to 8 weeks after knee replacement surgery before it is safe for you to drive.     · When you get into a car, sit on the edge of the seat. Then pull in your legs, and turn to face the front.     · You should be able to do many everyday activities 3 to 6 weeks after your surgery. You will probably need to take 4 to 16 weeks off from work. When you can go back to work depends on the type of work you do and how you feel.     · Ask your doctor when it is okay for you to have sex.     · Do not lift anything heavier than 10 pounds and do not lift weights for 12 weeks. Diet    · By the time you leave the hospital, you should be eating your normal diet. If your stomach is upset, try bland, low-fat foods like plain rice, broiled chicken, toast, and yogurt. Your doctor may suggest that you take iron and vitamin supplements.     · Drink plenty of fluids (unless your doctor tells you not to).   · Eat healthy foods, and watch your portion sizes. Try to stay at your ideal weight. Too much weight puts more stress on your new knee.     · You may notice that your bowel movements are not regular right after your surgery. This is common. Try to avoid constipation and straining with bowel movements. You may want to take a fiber supplement every day. If you have not had a bowel movement after a couple of days, ask your doctor about taking a mild laxative. Medicines    · Your doctor will tell you if and when you can restart your medicines. He or she will also give you instructions about taking any new medicines.     · If you take blood thinners, such as warfarin (Coumadin), clopidogrel (Plavix), or aspirin, be sure to talk to your doctor. He or she will tell you if and when to start taking those medicines again. Make sure that you understand exactly what your doctor wants you to do.     · Your doctor may give you a blood-thinning medicine to prevent blood clots. If you take a blood thinner, be sure you get instructions about how to take your medicine safely. Blood thinners can cause serious bleeding problems.  This medicine could be in pill form or as a shot (injection). If a shot is necessary, your doctor will tell you how to do this.     · Be safe with medicines. Take pain medicines exactly as directed. ? If the doctor gave you a prescription medicine for pain, take it as prescribed. ? If you are not taking a prescription pain medicine, ask your doctor if you can take an over-the-counter medicine. ? Plan to take your pain medicine 30 minutes before exercises. It is easier to prevent pain before it starts than to stop it once it has started.     · If you think your pain medicine is making you sick to your stomach:  ? Take your medicine after meals (unless your doctor has told you not to). ? Ask your doctor for a different pain medicine.     · If your doctor prescribed antibiotics, take them as directed. Do not stop taking them just because you feel better. You need to take the full course of antibiotics. Incision care    · If your doctor told you how to care for your cut (incision), follow your doctor's instructions. You will have a dressing over the cut. A dressing helps the incision heal and protects it. Your doctor will tell you how to take care of this.     · If you did not get instructions, follow this general advice:  ? If you have strips of tape on the cut the doctor made, leave the tape on for a week or until it falls off.  ? If you have stitches or staples, your doctor will tell you when to come back to have them removed. ? If you have skin adhesive on the cut, leave it on until it falls off. Skin adhesive is also called glue or liquid stitches. ? Change the bandage every day. ? Wash the area daily with warm water, and pat it dry. Don't use hydrogen peroxide or alcohol. They can slow healing. ? You may cover the area with a gauze bandage if it oozes fluid or rubs against clothing. ? You may shower 24 to 48 hours after surgery. Pat the incision dry.  Don't swim or take a bath for the first 2 weeks, or until your doctor tells you it is okay. Exercise    · Your rehab program will give you a number of exercises to do to help you get back your knee's range of motion and strength. Always do them as your therapist tells you. Ice and elevation    · For pain and swelling, put ice or a cold pack on the area for 10 to 20 minutes at a time. Put a thin cloth between the ice and your skin. Other instructions    · Continue to wear your support stockings as your doctor says. These help to prevent blood clots. The length of time that you will have to wear them depends on your activity level and the amount of swelling.     · You have metal pieces in your knee. These may set off some airport metal detectors. Carry a medical alert card that says you have an artificial joint, just in case. Follow-up care is a key part of your treatment and safety. Be sure to make and go to all appointments, and call your doctor if you are having problems. It's also a good idea to know your test results and keep a list of the medicines you take. When should you call for help? Call 911 anytime you think you may need emergency care. For example, call if:    · You passed out (lost consciousness).     · You have severe trouble breathing.     · You have sudden chest pain and shortness of breath, or you cough up blood.    Call your doctor now or seek immediate medical care if:    · You have signs of infection, such as:  ? Increased pain, swelling, warmth, or redness. ? Red streaks leading from the incision. ? Pus draining from the incision. ? A fever.     · You have signs of a blood clot, such as:  ? Pain in your calf, back of the knee, thigh, or groin. ?  Redness and swelling in your leg or groin.     · Your incision comes open and begins to bleed, or the bleeding increases.     · You have pain that does not get better after you take pain medicine.    Watch closely for changes in your health, and be sure to contact your doctor if:    · You do not have a bowel movement after taking a laxative. Where can you learn more? Go to http://sushma-latha.info/. Enter W988 in the search box to learn more about \"Total Knee Replacement: What to Expect at Home. \"  Current as of: September 20, 2018  Content Version: 11.9  © 3051-9237 [employer]. Care instructions adapted under license by [employer] (which disclaims liability or warranty for this information). If you have questions about a medical condition or this instruction, always ask your healthcare professional. [address] any warranty or liability for your use of this information. These are general instructions for a healthy lifestyle:    No smoking/ No tobacco products/ Avoid exposure to second hand smoke    Surgeon General's Warning:  Quitting smoking now greatly reduces serious risk to your health. Obesity, smoking, and sedentary lifestyle greatly increases your risk for illness    A healthy diet, regular physical exercise & weight monitoring are important for maintaining a healthy lifestyle    You may be retaining fluid if you have a history of heart failure or if you experience any of the following symptoms:  Weight gain of 3 pounds or more overnight or 5 pounds in a week, increased swelling in our hands or feet or shortness of breath while lying flat in bed. Please call your doctor as soon as you notice any of these symptoms; do not wait until your next office visit. Recognize signs and symptoms of STROKE:    F-face looks uneven    A-arms unable to move or move even    S-speech slurred or non-existent    T-time-call 911 as soon as signs and symptoms begin-DO NOT go       Back to bed or wait to see if you get better-TIME IS BRAIN. The discharge information has been reviewed with the patient. The patient verbalized understanding.       Information obtained by :  I understand that if any problems occur once I am at home I am to contact my physician. I understand and acknowledge receipt of the instructions indicated above.                                                                                                                                            Physician's or R.N.'s Signature                                                                  Date/Time                                                                                                                                              Patient or Representative Signature                                                          Date/Time

## 2019-03-08 NOTE — PROGRESS NOTES
Has been to group therapy. Medicated for pain with dilaudid po and tylenol. Given prescription for dilaudid and instructed how to take. Has follow up appt already scheduled with Dr Dorene Barros. Home therapy to see pt. Instructed to leave bandage in place and call doctor if having fever, excessive drainage, numbness or other problems. I have reviewed discharge instructions with the patient and spouse. The patient and spouse verbalized understanding. Taken to car via wheelchair. Discharged home with .

## 2019-03-08 NOTE — PROGRESS NOTES
2019         Post Op day: 2 Days Post-OpProcedure(s) (LRB):  KNEE ARTHROPLASTY TOTAL/ RIGHT (Right)      Admit Date: 3/6/2019  Admit Diagnosis: Primary osteoarthritis of right knee [M17.11]  Osteoarthritis of right knee [M17.11]  Osteoarthritis [M19.90]    LAB:    Recent Results (from the past 24 hour(s))   PLEASE READ & DOCUMENT PPD TEST IN 24 HRS    Collection Time: 19  9:10 AM   Result Value Ref Range    PPD  Negative    mm Induration 0 0 - 5 mm   GLUCOSE, POC    Collection Time: 19 11:40 AM   Result Value Ref Range    Glucose (POC) 164 (H) 65 - 100 mg/dL   GLUCOSE, POC    Collection Time: 19  4:26 PM   Result Value Ref Range    Glucose (POC) 141 (H) 65 - 100 mg/dL   GLUCOSE, POC    Collection Time: 19  8:34 PM   Result Value Ref Range    Glucose (POC) 150 (H) 65 - 100 mg/dL   HEMOGLOBIN    Collection Time: 19  4:21 AM   Result Value Ref Range    HGB 9.5 (L) 11.7 - 15.4 g/dL   GLUCOSE, POC    Collection Time: 19  6:37 AM   Result Value Ref Range    Glucose (POC) 126 (H) 65 - 100 mg/dL     Vital Signs:    Patient Vitals for the past 8 hrs:   BP Temp Pulse Resp SpO2   19 0704 130/77 97.9 °F (36.6 °C) 67 18 95 %   19 0408 129/76 98 °F (36.7 °C) 64 17 93 %   19 0000 135/78 98 °F (36.7 °C) 60 17 95 %     Temp (24hrs), Av °F (36.7 °C), Min:97.4 °F (36.3 °C), Max:98.4 °F (36.9 °C)    Body mass index is 40.43 kg/m².   Pain Control:   Pain Assessment  Pain Scale 1: Numeric (0 - 10)  Pain Intensity 1: 2  Pain Onset 1: at  rest  Pain Location 1: Knee  Pain Orientation 1: Right  Pain Description 1: Intermittent  Pain Intervention(s) 1: Medication (see MAR)    Subjective: Doing well, No complaints, No SOB, No Chest Pain, No Nausea or Vomitting     Objective: Vital Signs are Stable, No Acute Distress, Alert and Oriented, Dressing is Dry,  Neurovascular exam is normal.       PT/OT:            Assistive Device: Walker (comment)  RLE AROM  R Knee Flexion: 79  R Knee Extension: 0             Weight Bearing Status: WBAT    Meds:  [unfilled]  [unfilled]  [unfilled]    Assessment:   Patient Active Problem List   Diagnosis Code    Essential hypertension with goal blood pressure less than 130/85 I10    Type 2 diabetes mellitus without complication (HCC) O10.4    Pure hypercholesterolemia E78.00    Pure hyperglyceridemia E78.1    Vitamin D deficiency E55.9    Depression F32.9    Obesity, morbid ([address] E66.01    Snoring R06.83    Osteoarthritis of right knee M17.11    Osteoarthritis M19.90    Status post total knee replacement, right Z96.651             Plan:   Continue Physical Therapy  Monitor  Hgb  D/C Home Today         Signed By: Torri Santos, NP

## 2019-03-08 NOTE — PROGRESS NOTES
2019         Post Op day: 2 Days Post-OpProcedure(s) (LRB):  KNEE ARTHROPLASTY TOTAL/ RIGHT (Right)      Admit Date: 3/6/2019  Admit Diagnosis: Primary osteoarthritis of right knee [M17.11]  Osteoarthritis of right knee [M17.11]  Osteoarthritis [M19.90]    LAB:    Recent Results (from the past 24 hour(s))   PLEASE READ & DOCUMENT PPD TEST IN 24 HRS    Collection Time: 19  9:10 AM   Result Value Ref Range    PPD  Negative    mm Induration 0 0 - 5 mm   GLUCOSE, POC    Collection Time: 19 11:40 AM   Result Value Ref Range    Glucose (POC) 164 (H) 65 - 100 mg/dL   GLUCOSE, POC    Collection Time: 19  4:26 PM   Result Value Ref Range    Glucose (POC) 141 (H) 65 - 100 mg/dL   GLUCOSE, POC    Collection Time: 19  8:34 PM   Result Value Ref Range    Glucose (POC) 150 (H) 65 - 100 mg/dL   HEMOGLOBIN    Collection Time: 19  4:21 AM   Result Value Ref Range    HGB 9.5 (L) 11.7 - 15.4 g/dL   GLUCOSE, POC    Collection Time: 19  6:37 AM   Result Value Ref Range    Glucose (POC) 126 (H) 65 - 100 mg/dL     Vital Signs:    Patient Vitals for the past 8 hrs:   BP Temp Pulse Resp SpO2   19 0408 129/76 98 °F (36.7 °C) 64 17 93 %   19 0000 135/78 98 °F (36.7 °C) 60 17 95 %     Temp (24hrs), Av °F (36.7 °C), Min:97.4 °F (36.3 °C), Max:98.4 °F (36.9 °C)    Body mass index is 40.43 kg/m².   Pain Control:   Pain Assessment  Pain Scale 1: Numeric (0 - 10)  Pain Intensity 1: 2  Pain Onset 1: at  rest  Pain Location 1: Knee  Pain Orientation 1: Right  Pain Description 1: Intermittent  Pain Intervention(s) 1: Medication (see MAR)    Subjective: Doing well, No complaints, No SOB, No Chest Pain, No Nausea or Vomitting     Objective: Vital Signs are Stable, No Acute Distress, Alert and Oriented, Dressing is Dry,  Neurovascular exam is normal.       PT/OT:            Assistive Device: Walker (comment)  RLE AROM  R Knee Flexion: 79  R Knee Extension: 0             Weight Bearing Status: WBAT    Meds:  [unfilled]  [unfilled]  [unfilled]    Assessment:   Patient Active Problem List   Diagnosis Code    Essential hypertension with goal blood pressure less than 130/85 I10    Type 2 diabetes mellitus without complication (HCC) M80.7    Pure hypercholesterolemia E78.00    Pure hyperglyceridemia E78.1    Vitamin D deficiency E55.9    Depression F32.9    Obesity, morbid ([address] E66.01    Snoring R06.83    Osteoarthritis of right knee M17.11    Osteoarthritis M19.90    Status post total knee replacement, right Z96.651             Plan: Continue Physical Therapy, Monitor  Hbg, home today.         Signed By: Minh Bell MD

## 2019-03-11 ENCOUNTER — PATIENT OUTREACH (OUTPATIENT)
Dept: CASE MANAGEMENT | Age: 74
End: 2019-03-11

## 2019-03-11 NOTE — PROGRESS NOTES
This note will not be viewable in [address] Date/Time of Call:   03/11/19   1002am   What was the patient hospitalized for? S/P Total Right Knee Replacement   Consent for ELDA Call     Does the patient understand his/her diagnosis and/or treatment and what happened during the hospitalization? Patient agrees to call    Yes she understands the hospitalization. States that she is doing pretty well   Did the patient receive discharge instructions? Yes   CM Assessed Risk for Readmission:       Patient stated Risk for Readmission:  Patient is a low risk for readmission, scheduled admit    No concerns voiced at this time   Review any discharge instructions (see discharge instructions/AVS in [address] [facility]). Ask patient if they understand these. Do they have any questions? Yes    Were home services ordered (nursing, PT, OT, ST, etc.)? Yes      If so, has the first visit occurred? If not, why? (Assist with coordination of services if necessary.)   Prabhu Ryan PT ordered. Has not heard to schedule yet. Agrees to call Care Coordinator if she has not heard from [facility] by tomorrow    Was any DME ordered? No   If so, has it been received? If not, why?  (Assist patient in obtaining DME orders &/or equipment if necessary.) Walker/ BSC ordered and received    Complete a review of all medications (new, continued and discontinued meds per the D/C instructions and medication tab in [facility]). Medications reviewed    START taking:  HYDROmorphone 2 mg tablet (DILAUDID) CHANGE how you take:  aspirin 325 mg tablet (ASPIRIN)     Were all new prescriptions filled? If not, why?  (Assist patient in obtaining medications if necessary  escalate for CCM &/or SW if ongoing issues are verbalized by pt or anticipated)   yes    Does the patient understand the purpose and dosing instructions for all medications?   (If patient has questions, provide explanation and education.)   patient verbalizes understanding of medications    Does the patient have any problems in performing ADLs? (If patient is unable to perform ADLs  what is the limiting factor(s)? Do they have a support system that can assist? If no support system is present, discuss possible assistance that they may be able to obtain. Escalate for CCM/SW if ongoing issues are verbalized by pt or anticipated)   Patient independent with ADLs prior to admission   Does the patient have all follow-up appointments scheduled? 7 day f/up with PCP?   (f/up with PCP may be w/in 14 days if patient has a f/up with their specialist w/in 7 days)    7-14 day f/up with specialist?   (or per discharge instructions)      If f/up has not been made  what actions has the care coordinator made to accomplish this? Has transportation been arranged? yes       04/11/19        Scheduled in April. Has appointment information written down, but does not know information right off    Reviewed appointment information and importance of FUs with patient       Patients spouse will transport until able to drive self again    Any other questions or concerns expressed by the patient? No questions or concerns are voiced at this time. Care Coordinator contact information provided should any needs arise. Schedule next appointment with JOHANNA MCDANIEL Coordinator or refer to RN Case Manager/ per the workflow guidelines. When is care coordinators next follow-up call scheduled? If referred for CCM  what RN care manager was the referral assigned?    Within 30 days          Within 30 days      Within 30 days    ELDA Call Completed By: Enoch Barton, [facility] Coordinator

## 2019-04-01 ENCOUNTER — PATIENT OUTREACH (OUTPATIENT)
Dept: CASE MANAGEMENT | Age: 74
End: 2019-04-01

## 2019-04-01 NOTE — PROGRESS NOTES
This note will not be viewable in [address] 1st attempt to reach patient for a 30 day [facility] No answer, left  for returned call. [facility] Coordinator will attempt to reach patient again within the 30 day period.

## 2019-04-02 ENCOUNTER — PATIENT OUTREACH (OUTPATIENT)
Dept: CASE MANAGEMENT | Age: 74
End: 2019-04-02

## 2019-04-02 NOTE — PROGRESS NOTES
This note will not be viewable in [address] Transitions of Care  Follow up Outreach Note Outreach type Phone call: Spoke with patient Date/Time of Outreach: 04/02/19 136pm  
 
Has patient attended PCP or specialist follow-up appointments since last contact? What was outcome of appointment? When is next follow-up scheduled? Patient is scheduled for FU with Ortho 04/09/19, and PCP 04/11/19. She states that she is doing great and is very pleased Review medications. Any medication changes since last outreach? Does patient have any questions or issues related to their medications? Patient has medications, no significant changes, no questions or concerns Home health active? If yes  any issue? Progress? HH, Mariza Avelar, PT is still coming into the home for Therapy Referrals needed? 
(CM, SW, HH, etc.) 
 NA Other issues/Miscellaneous? (Transportation, access to meals, ability to perform ADLs, adequate caregiver support, etc.) No questions or concerns are voiced at this time. Patient has medications and transportation to appointments. Independent with ADLs, family assists PRN Next Outreach Scheduled? Graduation from program? 
 NA Yes Next Steps/Goals (if applicable): 
 NA Outreach completed by: 
 Nikhil Izaguirre CMA Community Care Coordinator

## 2019-10-23 ENCOUNTER — HOSPITAL ENCOUNTER (OUTPATIENT)
Dept: MAMMOGRAPHY | Age: 74
Discharge: HOME | End: 2019-10-23
Attending: FAMILY MEDICINE
Payer: MEDICARE

## 2019-10-23 DIAGNOSIS — Z12.31 VISIT FOR SCREENING MAMMOGRAM: ICD-10-CM

## 2019-10-23 PROCEDURE — 77067 SCR MAMMO BI INCL CAD: CPT

## 2019-10-28 ENCOUNTER — HOSPITAL ENCOUNTER (OUTPATIENT)
Dept: MAMMOGRAPHY | Age: 74
Discharge: HOME | End: 2019-10-28
Attending: FAMILY MEDICINE
Payer: MEDICARE

## 2019-10-28 DIAGNOSIS — R92.8 ABNORMAL SCREENING MAMMOGRAM: ICD-10-CM

## 2019-10-28 PROCEDURE — 77065 DX MAMMO INCL CAD UNI: CPT

## 2019-11-12 ENCOUNTER — APPOINTMENT (RX ONLY)
Dept: URBAN - METROPOLITAN AREA CLINIC 349 | Facility: CLINIC | Age: 74
Setting detail: DERMATOLOGY
End: 2019-11-12

## 2019-11-12 DIAGNOSIS — L82.1 OTHER SEBORRHEIC KERATOSIS: ICD-10-CM

## 2019-11-12 DIAGNOSIS — L82.0 INFLAMED SEBORRHEIC KERATOSIS: ICD-10-CM

## 2019-11-12 PROBLEM — L55.1 SUNBURN OF SECOND DEGREE: Status: ACTIVE | Noted: 2019-11-12

## 2019-11-12 PROCEDURE — ? PATHOLOGY BILLING

## 2019-11-12 PROCEDURE — ? SHAVE REMOVAL

## 2019-11-12 PROCEDURE — 11302 SHAVE SKIN LESION 1.1-2.0 CM: CPT

## 2019-11-12 PROCEDURE — A4550 SURGICAL TRAYS: HCPCS

## 2019-11-12 PROCEDURE — 11301 SHAVE SKIN LESION 0.6-1.0 CM: CPT

## 2019-11-12 PROCEDURE — 11306 SHAVE SKIN LESION 0.6-1.0 CM: CPT

## 2019-11-12 PROCEDURE — ? COUNSELING

## 2019-11-12 PROCEDURE — 99213 OFFICE O/P EST LOW 20 MIN: CPT | Mod: 25

## 2019-11-12 PROCEDURE — 88305 TISSUE EXAM BY PATHOLOGIST: CPT

## 2019-11-12 ASSESSMENT — LOCATION ZONE DERM
LOCATION ZONE: NECK
LOCATION ZONE: TRUNK
LOCATION ZONE: NECK
LOCATION ZONE: TRUNK

## 2019-11-12 ASSESSMENT — LOCATION DETAILED DESCRIPTION DERM
LOCATION DETAILED: RIGHT CLAVICULAR NECK
LOCATION DETAILED: SUPERIOR THORACIC SPINE
LOCATION DETAILED: SUPERIOR THORACIC SPINE
LOCATION DETAILED: RIGHT CLAVICULAR SKIN
LOCATION DETAILED: RIGHT CLAVICULAR NECK
LOCATION DETAILED: RIGHT CLAVICULAR SKIN
LOCATION DETAILED: MIDDLE STERNUM

## 2019-11-12 ASSESSMENT — LOCATION SIMPLE DESCRIPTION DERM
LOCATION SIMPLE: CHEST
LOCATION SIMPLE: RIGHT CLAVICULAR SKIN
LOCATION SIMPLE: RIGHT ANTERIOR NECK
LOCATION SIMPLE: UPPER BACK
LOCATION SIMPLE: RIGHT ANTERIOR NECK
LOCATION SIMPLE: UPPER BACK
LOCATION SIMPLE: RIGHT CLAVICULAR SKIN

## 2019-11-12 NOTE — PROCEDURE: PATHOLOGY BILLING
Immunohistochemistry ([id number] and [id number]) billing is not performed here. Please use the Immunohistochemistry Stain Billing plan to accomplish this.

## 2019-11-12 NOTE — PROCEDURE: PATHOLOGY BILLING
Immunohistochemistry ([id number] and [id number]) billing is not performed here. Please use the Immunohistochemistry Stain Billing plan to accomplish this. Immunohistochemistry ([id number] and [id number]) billing is not performed here. Please use the Immunohistochemistry Stain Billing plan to accomplish this.

## 2019-11-12 NOTE — PROCEDURE: SHAVE REMOVAL
Bill 21598 For Specimen Handling/Conveyance To Laboratory?: no
X Size Of Lesion In Cm (Optional): 0
Accession #: dr [email or web address] read
Wound Care: Vaseline
Billing Type: Third-Party Bill
Medical Necessity Clause: This procedure was medically necessary because the lesion that was treated was: changing
Notification Instructions: Patient will be notified of biopsy results. However, patient instructed to call the office if not contacted within 2 weeks.
Render Post-Care Instructions In Note?: yes
Detail Level: Detailed
Hemostasis: Electrocautery
Biopsy Method: Dermablade
Consent was obtained from the patient. The risks and benefits to therapy were discussed in detail. Specifically, the risks of infection, scarring, bleeding, prolonged wound healing, incomplete removal, allergy to anesthesia, nerve injury and recurrence were addressed. Prior to the procedure, the treatment site was clearly identified and confirmed by the patient. All components of Universal Protocol/PAUSE Rule completed.
Anesthesia Volume In Cc: 0.5
Anesthesia Type: 2% lidocaine with epinephrine
Medical Necessity Information: It is in your best interest to select a reason for this procedure from the list below. All of these items fulfill various CMS LCD requirements except the new and changing color options.
Post-Care Instructions: I reviewed with the patient in detail post-care instructions. Patient is to keep the biopsy site dry overnight, and then apply vaseline twice daily until healed. Patient may apply hydrogen peroxide soaks to remove any crusting. After the procedure, the patient was observed for 5-10 minutes and was oriented to person, place and time and demied feeling dizzy, queasy, and stated that they did not feel that they were going to faint.
Size Of Lesion In Cm (Required): 0.7
Size Of Lesion In Cm (Required): 1.2
Size Of Lesion In Cm (Required): 0.8

## 2019-11-12 NOTE — PROCEDURE: PATHOLOGY BILLING
Immunohistochemistry ([id number] and [id number]) billing is not performed here. Please use the Immunohistochemistry Stain Billing plan to accomplish this.

## 2020-10-05 ENCOUNTER — TRANSCRIBE ORDER (OUTPATIENT)
Dept: SCHEDULING | Age: 75
End: 2020-10-05

## 2020-10-05 DIAGNOSIS — Z12.31 VISIT FOR SCREENING MAMMOGRAM: Primary | ICD-10-CM

## 2020-10-26 ENCOUNTER — HOSPITAL ENCOUNTER (OUTPATIENT)
Dept: MAMMOGRAPHY | Age: 75
Discharge: HOME | End: 2020-10-26
Attending: FAMILY MEDICINE
Payer: MEDICARE

## 2020-10-26 DIAGNOSIS — Z12.31 VISIT FOR SCREENING MAMMOGRAM: ICD-10-CM

## 2020-10-26 PROCEDURE — 77067 SCR MAMMO BI INCL CAD: CPT

## 2020-11-12 ENCOUNTER — APPOINTMENT (RX ONLY)
Dept: URBAN - METROPOLITAN AREA CLINIC 349 | Facility: CLINIC | Age: 75
Setting detail: DERMATOLOGY
End: 2020-11-12

## 2022-02-03 ENCOUNTER — TRANSCRIBE ORDER (OUTPATIENT)
Dept: SCHEDULING | Age: 77
End: 2022-02-03

## 2022-02-03 DIAGNOSIS — Z12.31 VISIT FOR SCREENING MAMMOGRAM: Primary | ICD-10-CM

## 2022-02-07 NOTE — ANESTHESIA POSTPROCEDURE EVALUATION
Procedure(s):  KNEE ARTHROPLASTY TOTAL/ RIGHT.     Anesthesia Post Evaluation      Multimodal analgesia: multimodal analgesia used between 6 hours prior to anesthesia start to PACU discharge  Patient location during evaluation: bedside  Patient participation: complete - patient participated  Level of consciousness: awake and responsive to light touch  Pain management: adequate  Airway patency: patent  Anesthetic complications: no  Cardiovascular status: acceptable, hemodynamically stable, blood pressure returned to baseline and stable  Respiratory status: acceptable, unassisted, spontaneous ventilation and nonlabored ventilation  Hydration status: acceptable  Post anesthesia nausea and vomiting:  controlled      Visit Vitals  /70   Pulse (P) 72   Temp 36.6 °C (97.9 °F)   Resp (P) 16   Ht 5' (1.524 m)   Wt 93.9 kg (207 lb 0.2 oz)   SpO2 96%   BMI 40.43 kg/m² Successful transmission received. Please call patient and give next appointment. no

## 2022-02-28 ENCOUNTER — APPOINTMENT (RX ONLY)
Dept: URBAN - METROPOLITAN AREA CLINIC 329 | Facility: CLINIC | Age: 77
Setting detail: DERMATOLOGY
End: 2022-02-28

## 2022-02-28 DIAGNOSIS — D22 MELANOCYTIC NEVI: ICD-10-CM | Status: STABLE

## 2022-02-28 DIAGNOSIS — Z71.89 OTHER SPECIFIED COUNSELING: ICD-10-CM

## 2022-02-28 DIAGNOSIS — D485 NEOPLASM OF UNCERTAIN BEHAVIOR OF SKIN: ICD-10-CM | Status: INADEQUATELY CONTROLLED

## 2022-02-28 DIAGNOSIS — L82.0 INFLAMED SEBORRHEIC KERATOSIS: ICD-10-CM | Status: INADEQUATELY CONTROLLED

## 2022-02-28 DIAGNOSIS — D18.0 HEMANGIOMA: ICD-10-CM | Status: STABLE

## 2022-02-28 PROBLEM — D48.5 NEOPLASM OF UNCERTAIN BEHAVIOR OF SKIN: Status: ACTIVE | Noted: 2022-02-28

## 2022-02-28 PROBLEM — D18.01 HEMANGIOMA OF SKIN AND SUBCUTANEOUS TISSUE: Status: ACTIVE | Noted: 2022-02-28

## 2022-02-28 PROBLEM — D22.5 MELANOCYTIC NEVI OF TRUNK: Status: ACTIVE | Noted: 2022-02-28

## 2022-02-28 PROCEDURE — 11102 TANGNTL BX SKIN SINGLE LES: CPT | Mod: 59

## 2022-02-28 PROCEDURE — ? SHAVE REMOVAL

## 2022-02-28 PROCEDURE — ? SUNSCREEN RECOMMENDATIONS

## 2022-02-28 PROCEDURE — ? PHOTO-DOCUMENTATION

## 2022-02-28 PROCEDURE — ? FULL BODY SKIN EXAM - DECLINED

## 2022-02-28 PROCEDURE — 99213 OFFICE O/P EST LOW 20 MIN: CPT | Mod: 25

## 2022-02-28 PROCEDURE — 11103 TANGNTL BX SKIN EA SEP/ADDL: CPT

## 2022-02-28 PROCEDURE — ? COUNSELING

## 2022-02-28 PROCEDURE — 11312 SHAVE SKIN LESION 1.1-2.0 CM: CPT

## 2022-02-28 PROCEDURE — ? BIOPSY BY SHAVE METHOD

## 2022-02-28 ASSESSMENT — LOCATION DETAILED DESCRIPTION DERM
LOCATION DETAILED: RIGHT LATERAL SUPERIOR CHEST
LOCATION DETAILED: RIGHT PROXIMAL DORSAL FOREARM
LOCATION DETAILED: RIGHT CENTRAL MALAR CHEEK
LOCATION DETAILED: RIGHT LATERAL SUPERIOR CHEST
LOCATION DETAILED: PERIUMBILICAL SKIN
LOCATION DETAILED: RIGHT FOREHEAD
LOCATION DETAILED: LEFT LATERAL MALAR CHEEK
LOCATION DETAILED: RIGHT CENTRAL MALAR CHEEK
LOCATION DETAILED: LEFT SUPERIOR MEDIAL UPPER BACK
LOCATION DETAILED: EPIGASTRIC SKIN
LOCATION DETAILED: RIGHT PROXIMAL DORSAL FOREARM
LOCATION DETAILED: RIGHT INFERIOR CENTRAL MALAR CHEEK
LOCATION DETAILED: RIGHT FOREHEAD

## 2022-02-28 ASSESSMENT — LOCATION ZONE DERM
LOCATION ZONE: ARM
LOCATION ZONE: FACE
LOCATION ZONE: ARM
LOCATION ZONE: TRUNK
LOCATION ZONE: TRUNK
LOCATION ZONE: FACE

## 2022-02-28 ASSESSMENT — LOCATION SIMPLE DESCRIPTION DERM
LOCATION SIMPLE: LEFT CHEEK
LOCATION SIMPLE: RIGHT CHEEK
LOCATION SIMPLE: ABDOMEN
LOCATION SIMPLE: RIGHT CHEEK
LOCATION SIMPLE: CHEST
LOCATION SIMPLE: ABDOMEN
LOCATION SIMPLE: RIGHT FOREHEAD
LOCATION SIMPLE: RIGHT FOREARM
LOCATION SIMPLE: RIGHT FOREARM
LOCATION SIMPLE: CHEST
LOCATION SIMPLE: RIGHT FOREHEAD
LOCATION SIMPLE: LEFT UPPER BACK

## 2022-02-28 NOTE — PROCEDURE: SHAVE REMOVAL
Medical Necessity Information: It is in your best interest to select a reason for this procedure from the list below. All of these items fulfill various CMS LCD requirements except the new and changing color options.
Medical Necessity Clause: This procedure was medically necessary because the lesion that was treated was:
Lab Facility: 0
Detail Level: Detailed
Was A Bandage Applied: Yes
Size Of Lesion In Cm (Required): 1.2
Biopsy Method: Dermablade
Anesthesia Type: 1% lidocaine with epinephrine
Hemostasis: Drysol
Wound Care: Petrolatum
Render Path Notes In Note?: No
Consent was obtained from the patient. The risks and benefits to therapy were discussed in detail. Specifically, the risks of infection, scarring, bleeding, prolonged wound healing, incomplete removal, allergy to anesthesia, nerve injury and recurrence were addressed. Prior to the procedure, the treatment site was clearly identified and confirmed by the patient. All components of Universal Protocol/PAUSE Rule completed.
Post-Care Instructions: I reviewed with the patient in detail post-care instructions. Patient is to keep the biopsy site dry overnight, and then apply bacitracin twice daily until healed. Patient may apply hydrogen peroxide soaks to remove any crusting.
Notification Instructions: Patient will be notified of pathology results. However, patient instructed to call the office if not contacted within 2 weeks.
Billing Type: Third-Party Bill

## 2022-03-01 ENCOUNTER — HOSPITAL ENCOUNTER (OUTPATIENT)
Dept: MAMMOGRAPHY | Age: 77
Discharge: HOME | End: 2022-03-01
Attending: FAMILY MEDICINE
Payer: MEDICARE

## 2022-03-01 DIAGNOSIS — Z12.31 VISIT FOR SCREENING MAMMOGRAM: ICD-10-CM

## 2022-03-01 PROCEDURE — 77067 SCR MAMMO BI INCL CAD: CPT

## 2022-03-18 PROBLEM — R06.83 SNORING: Status: ACTIVE | Noted: 2019-02-20

## 2022-03-19 PROBLEM — Z96.651 STATUS POST TOTAL KNEE REPLACEMENT, RIGHT: Status: ACTIVE | Noted: 2019-03-06

## 2022-03-19 PROBLEM — M19.90 OSTEOARTHRITIS: Status: ACTIVE | Noted: 2019-03-06

## 2022-03-19 PROBLEM — M17.11 OSTEOARTHRITIS OF RIGHT KNEE: Status: ACTIVE | Noted: 2019-03-06

## 2022-03-19 PROBLEM — E66.01 OBESITY, MORBID (HCC): Status: ACTIVE | Noted: 2018-04-16

## 2022-03-24 ENCOUNTER — APPOINTMENT (RX ONLY)
Dept: URBAN - METROPOLITAN AREA CLINIC 329 | Facility: CLINIC | Age: 77
Setting detail: DERMATOLOGY
End: 2022-03-24

## 2022-03-24 PROBLEM — C44.612 BASAL CELL CARCINOMA OF SKIN OF RIGHT UPPER LIMB, INCLUDING SHOULDER: Status: ACTIVE | Noted: 2022-03-24

## 2022-03-24 PROCEDURE — ? CURETTAGE AND DESTRUCTION

## 2022-03-24 PROCEDURE — ? COUNSELING

## 2022-03-24 PROCEDURE — 17262 DSTRJ MAL LES T/A/L 1.1-2.0: CPT

## 2022-03-24 NOTE — PROCEDURE: CURETTAGE AND DESTRUCTION
Detail Level: Detailed
Number Of Curettages: 3
Size Of Lesion In Cm: 1.1
Add Intralesional Injection: No
Concentration (Mg/Ml Or Millions Of Plaque Forming Units/Cc): 0.01
Total Volume (Ccs): 1
Anesthesia Type: 1% lidocaine with epinephrine
Cautery Type: electrodesiccation
What Was Performed First?: Curettage
Final Size Statement: The size of the lesion after curettage was
Additional Information: (Optional): The wound was cleaned, and a pressure dressing was applied.  The patient received detailed post-op instructions.
Consent was obtained from the patient. The risks, benefits and alternatives to therapy were discussed in detail. Specifically, the risks of infection, scarring, bleeding, prolonged wound healing, nerve injury, incomplete removal, allergy to anesthesia and recurrence were addressed. Alternatives to ED&C, such as: surgical removal and XRT were also discussed.  Prior to the procedure, the treatment site was clearly identified and confirmed by the patient.
Post-Care Instructions: I reviewed with the patient in detail post-care instructions. Patient is to keep the area dry for 48 hours, and not to engage in any swimming until the area is healed. Should the patient develop any fevers, chills, bleeding, severe pain patient will contact the office immediately.
Bill As A Line Item Or As Units: Line Item

## 2022-09-14 ENCOUNTER — NURSE ONLY (OUTPATIENT)
Dept: FAMILY MEDICINE CLINIC | Facility: CLINIC | Age: 77
End: 2022-09-14
Payer: MEDICARE

## 2022-09-14 DIAGNOSIS — E78.00 PURE HYPERCHOLESTEROLEMIA, UNSPECIFIED: ICD-10-CM

## 2022-09-14 DIAGNOSIS — Z79.4 TYPE 2 DIABETES MELLITUS WITHOUT COMPLICATION, WITH LONG-TERM CURRENT USE OF INSULIN (HCC): Primary | ICD-10-CM

## 2022-09-14 DIAGNOSIS — E11.9 TYPE 2 DIABETES MELLITUS WITHOUT COMPLICATION, WITH LONG-TERM CURRENT USE OF INSULIN (HCC): Primary | ICD-10-CM

## 2022-09-14 DIAGNOSIS — I10 ESSENTIAL (PRIMARY) HYPERTENSION: ICD-10-CM

## 2022-09-14 PROCEDURE — 36415 COLL VENOUS BLD VENIPUNCTURE: CPT | Performed by: FAMILY MEDICINE

## 2022-09-15 LAB
ALBUMIN SERPL-MCNC: 3.7 G/DL (ref 3.2–4.6)
ALBUMIN/GLOB SERPL: 1.2 {RATIO} (ref 1.2–3.5)
ALP SERPL-CCNC: 73 U/L (ref 50–136)
ALT SERPL-CCNC: 28 U/L (ref 12–65)
ANION GAP SERPL CALC-SCNC: 6 MMOL/L (ref 4–13)
AST SERPL-CCNC: 29 U/L (ref 15–37)
BILIRUB SERPL-MCNC: 0.6 MG/DL (ref 0.2–1.1)
BUN SERPL-MCNC: 33 MG/DL (ref 8–23)
CALCIUM SERPL-MCNC: 10.4 MG/DL (ref 8.3–10.4)
CHLORIDE SERPL-SCNC: 108 MMOL/L (ref 101–110)
CHOLEST SERPL-MCNC: 133 MG/DL
CO2 SERPL-SCNC: 27 MMOL/L (ref 21–32)
CREAT SERPL-MCNC: 1.2 MG/DL (ref 0.6–1)
EST. AVERAGE GLUCOSE BLD GHB EST-MCNC: 114 MG/DL
GLOBULIN SER CALC-MCNC: 3.1 G/DL (ref 2.3–3.5)
GLUCOSE SERPL-MCNC: 93 MG/DL (ref 65–100)
HBA1C MFR BLD: 5.6 % (ref 4.8–5.6)
HDLC SERPL-MCNC: 35 MG/DL (ref 40–60)
HDLC SERPL: 3.8 {RATIO}
LDLC SERPL CALC-MCNC: 67 MG/DL
POTASSIUM SERPL-SCNC: 4.2 MMOL/L (ref 3.5–5.1)
PROT SERPL-MCNC: 6.8 G/DL (ref 6.3–8.2)
SODIUM SERPL-SCNC: 141 MMOL/L (ref 136–145)
TRIGL SERPL-MCNC: 155 MG/DL (ref 35–150)
VLDLC SERPL CALC-MCNC: 31 MG/DL (ref 6–23)

## 2022-09-21 ENCOUNTER — OFFICE VISIT (OUTPATIENT)
Dept: FAMILY MEDICINE CLINIC | Facility: CLINIC | Age: 77
End: 2022-09-21
Payer: MEDICARE

## 2022-09-21 VITALS
HEIGHT: 60 IN | OXYGEN SATURATION: 99 % | SYSTOLIC BLOOD PRESSURE: 116 MMHG | DIASTOLIC BLOOD PRESSURE: 70 MMHG | WEIGHT: 204.6 LBS | HEART RATE: 60 BPM | BODY MASS INDEX: 40.17 KG/M2

## 2022-09-21 DIAGNOSIS — E78.1 PURE HYPERGLYCERIDEMIA: ICD-10-CM

## 2022-09-21 DIAGNOSIS — E78.00 PURE HYPERCHOLESTEROLEMIA: ICD-10-CM

## 2022-09-21 DIAGNOSIS — E11.9 TYPE 2 DIABETES MELLITUS WITHOUT COMPLICATION, WITHOUT LONG-TERM CURRENT USE OF INSULIN (HCC): Primary | ICD-10-CM

## 2022-09-21 DIAGNOSIS — F33.9 RECURRENT DEPRESSION (HCC): ICD-10-CM

## 2022-09-21 DIAGNOSIS — I10 PRIMARY HYPERTENSION: ICD-10-CM

## 2022-09-21 DIAGNOSIS — E66.01 SEVERE OBESITY (BMI 35.0-39.9) WITH COMORBIDITY (HCC): ICD-10-CM

## 2022-09-21 PROCEDURE — 2022F DILAT RTA XM EVC RTNOPTHY: CPT | Performed by: FAMILY MEDICINE

## 2022-09-21 PROCEDURE — 99213 OFFICE O/P EST LOW 20 MIN: CPT | Performed by: FAMILY MEDICINE

## 2022-09-21 PROCEDURE — 4004F PT TOBACCO SCREEN RCVD TLK: CPT | Performed by: FAMILY MEDICINE

## 2022-09-21 PROCEDURE — 1090F PRES/ABSN URINE INCON ASSESS: CPT | Performed by: FAMILY MEDICINE

## 2022-09-21 PROCEDURE — G8427 DOCREV CUR MEDS BY ELIG CLIN: HCPCS | Performed by: FAMILY MEDICINE

## 2022-09-21 PROCEDURE — G8400 PT W/DXA NO RESULTS DOC: HCPCS | Performed by: FAMILY MEDICINE

## 2022-09-21 PROCEDURE — 1123F ACP DISCUSS/DSCN MKR DOCD: CPT | Performed by: FAMILY MEDICINE

## 2022-09-21 PROCEDURE — G8417 CALC BMI ABV UP PARAM F/U: HCPCS | Performed by: FAMILY MEDICINE

## 2022-09-21 PROCEDURE — 3044F HG A1C LEVEL LT 7.0%: CPT | Performed by: FAMILY MEDICINE

## 2022-09-21 PROCEDURE — 3017F COLORECTAL CA SCREEN DOC REV: CPT | Performed by: FAMILY MEDICINE

## 2022-09-21 RX ORDER — HYDROCHLOROTHIAZIDE 25 MG/1
25 TABLET ORAL DAILY
Qty: 90 TABLET | Refills: 3 | Status: SHIPPED | OUTPATIENT
Start: 2022-09-21

## 2022-09-21 RX ORDER — OLMESARTAN MEDOXOMIL 40 MG/1
40 TABLET ORAL DAILY
Qty: 90 TABLET | Refills: 3 | Status: SHIPPED | OUTPATIENT
Start: 2022-09-21

## 2022-09-21 RX ORDER — CITALOPRAM 20 MG/1
20 TABLET ORAL DAILY
Qty: 90 TABLET | Refills: 3 | Status: SHIPPED | OUTPATIENT
Start: 2022-09-21

## 2022-09-21 RX ORDER — ATORVASTATIN CALCIUM 10 MG/1
10 TABLET, FILM COATED ORAL DAILY
Qty: 90 TABLET | Refills: 3 | Status: SHIPPED | OUTPATIENT
Start: 2022-09-21

## 2022-09-21 RX ORDER — FENOFIBRATE 160 MG/1
160 TABLET ORAL DAILY
Qty: 90 TABLET | Refills: 3 | Status: SHIPPED | OUTPATIENT
Start: 2022-09-21

## 2022-09-21 ASSESSMENT — PATIENT HEALTH QUESTIONNAIRE - PHQ9: DEPRESSION UNABLE TO ASSESS: FUNCTIONAL CAPACITY MOTIVATION LIMITS ACCURACY

## 2022-09-21 NOTE — PROGRESS NOTES
SUBJECTIVE:   Vincent Morales is a 78 y.o. female who has a past medical history significant for hypertension, diabetes, high cholesterol, high triglycerides, vitamin D deficiency and recurrent depression. Review of systems reveals no complaints of chest pain, shortness of breath, orthopnea or PND. GI and  review of systems is unremarkable. HPI  See above    Past Medical History, Past Surgical History, Family history, Social History, and Medications were all reviewed with the patient today and updated as necessary. Current Outpatient Medications   Medication Sig Dispense Refill    atorvastatin (LIPITOR) 10 MG tablet TAKE 1 TABLET BY MOUTH ONCE DAILY . APPOINTMENT REQUIRED FOR FUTURE REFILLS      Cholecalciferol 50 MCG (2000 UT) TABS Take by mouth      citalopram (CELEXA) 20 MG tablet TAKE 1 TABLET BY MOUTH ONCE DAILY . APPOINTMENT REQUIRED FOR FUTURE REFILLS      fenofibrate (TRIGLIDE) 160 MG tablet TAKE 1 TABLET BY MOUTH ONCE DAILY . APPOINTMENT REQUIRED FOR FUTURE REFILLS      hydroCHLOROthiazide (HYDRODIURIL) 25 MG tablet TAKE 1 TABLET BY MOUTH ONCE DAILY . APPOINTMENT REQUIRED FOR FUTURE REFILLS      olmesartan (BENICAR) 40 MG tablet TAKE 1 TABLET BY MOUTH ONCE DAILY . APPOINTMENT REQUIRED FOR FUTURE REFILLS      aspirin 325 MG tablet Take 325 mg by mouth (Patient not taking: Reported on 9/21/2022)       No current facility-administered medications for this visit.      No Known Allergies  Patient Active Problem List   Diagnosis    Vitamin D deficiency    Pure hypercholesterolemia    Pure hyperglyceridemia    Snoring    Status post total knee replacement, right    Osteoarthritis of right knee    Type 2 diabetes mellitus without complication (HCC)    Depression    Osteoarthritis    Obesity, morbid ([address]    Essential hypertension with goal blood pressure less than 130/85     Past Medical History:   Diagnosis Date    Anxiety     Arthritis     Depression     Diabetes mellitus ([address]     type 2 per PCP note, oral med only;  pt denies, reports does not check BS    Hypercholesterolemia     Hypertension     Morbid obesity ([address]     Personal history of colonic polyps     Status post total knee replacement, right 3/6/2019    Vitamin D deficiency      Past Surgical History:   Procedure Laterality Date    BREAST BIOPSY Left     ?  (Stereo Bx - negative per pt)     SECTION      CHOLECYSTECTOMY, LAPAROSCOPIC      COLONOSCOPY  2012    polyp removed, given 3-4 year recall    CYST REMOVAL      neck    TOTAL ABDOMINAL HYSTERECTOMY W/ BILATERAL SALPINGOOPHORECTOMY       Family History   Problem Relation Age of Onset    Breast Cancer Neg Hx     Hypertension Father     Heart Disease Father      Social History     Tobacco Use    Smoking status: Never    Smokeless tobacco: Never   Substance Use Topics    Alcohol use: No         Review of Systems  See above    OBJECTIVE:  /70   Pulse 60   Ht 5' (1.524 m)   Wt 204 lb 9.6 oz (92.8 kg)   SpO2 99%   BMI 39.96 kg/m²      Physical Exam  Constitutional:       General: She is not in acute distress. Appearance: Normal appearance. She is obese. She is not ill-appearing. HENT:      Head: Normocephalic and atraumatic. Cardiovascular:      Rate and Rhythm: Normal rate and regular rhythm. Heart sounds: Normal heart sounds. No murmur heard. Pulmonary:      Effort: Pulmonary effort is normal.      Breath sounds: Normal breath sounds. No wheezing or rhonchi. Musculoskeletal:         General: Normal range of motion. Cervical back: Normal range of motion and neck supple. Right lower leg: No edema. Left lower leg: No edema. Skin:     General: Skin is warm. Findings: No rash. Neurological:      Mental Status: She is alert and oriented to person, place, and time. Psychiatric:         Mood and Affect: Mood normal.         Behavior: Behavior normal.         Thought Content:  Thought content normal.         Judgment: Judgment normal. Medical problems and test results were reviewed with the patient today. ASSESSMENT and PLAN    1. Diabetes. A1c is 5.6. Previously 5.8. Continue dietary management. 2.  High cholesterol. LDL 67. Previously 76. Continue statin. Liver enzymes remain normal.    3.  High triglycerides. Triglycerides are 155. Continue fenofibrate. 4.  Hypertension. /70.    5.  Recurrent depression. Continue Celexa. No complaints. 6.  Obesity. BMI is 39.9. Dietary counseling provided. Elements of this note have been dictated using speech recognition software. As a result, errors of speech recognition may have occurred.

## 2022-12-12 ENCOUNTER — TELEPHONE (OUTPATIENT)
Dept: FAMILY MEDICINE CLINIC | Facility: CLINIC | Age: 77
End: 2022-12-12

## 2022-12-12 NOTE — TELEPHONE ENCOUNTER
Patient called requesting to see Tony Pina for headaches and elevated blood pressure. Patient went to urgent over the weekend.

## 2022-12-13 ENCOUNTER — OFFICE VISIT (OUTPATIENT)
Dept: FAMILY MEDICINE CLINIC | Facility: CLINIC | Age: 77
End: 2022-12-13
Payer: MEDICARE

## 2022-12-13 VITALS
SYSTOLIC BLOOD PRESSURE: 121 MMHG | DIASTOLIC BLOOD PRESSURE: 79 MMHG | WEIGHT: 201 LBS | HEIGHT: 60 IN | BODY MASS INDEX: 39.46 KG/M2

## 2022-12-13 DIAGNOSIS — I10 PRIMARY HYPERTENSION: Primary | ICD-10-CM

## 2022-12-13 DIAGNOSIS — F41.9 ANXIETY: ICD-10-CM

## 2022-12-13 DIAGNOSIS — F33.9 RECURRENT DEPRESSION (HCC): ICD-10-CM

## 2022-12-13 DIAGNOSIS — E11.9 TYPE 2 DIABETES MELLITUS WITHOUT COMPLICATION, WITHOUT LONG-TERM CURRENT USE OF INSULIN (HCC): ICD-10-CM

## 2022-12-13 DIAGNOSIS — R51.9 HEADACHE, UNSPECIFIED HEADACHE TYPE: ICD-10-CM

## 2022-12-13 PROCEDURE — 1123F ACP DISCUSS/DSCN MKR DOCD: CPT | Performed by: FAMILY MEDICINE

## 2022-12-13 PROCEDURE — G8484 FLU IMMUNIZE NO ADMIN: HCPCS | Performed by: FAMILY MEDICINE

## 2022-12-13 PROCEDURE — 99214 OFFICE O/P EST MOD 30 MIN: CPT | Performed by: FAMILY MEDICINE

## 2022-12-13 PROCEDURE — 3017F COLORECTAL CA SCREEN DOC REV: CPT | Performed by: FAMILY MEDICINE

## 2022-12-13 PROCEDURE — G8400 PT W/DXA NO RESULTS DOC: HCPCS | Performed by: FAMILY MEDICINE

## 2022-12-13 PROCEDURE — 1036F TOBACCO NON-USER: CPT | Performed by: FAMILY MEDICINE

## 2022-12-13 PROCEDURE — G8427 DOCREV CUR MEDS BY ELIG CLIN: HCPCS | Performed by: FAMILY MEDICINE

## 2022-12-13 PROCEDURE — 1090F PRES/ABSN URINE INCON ASSESS: CPT | Performed by: FAMILY MEDICINE

## 2022-12-13 PROCEDURE — 3074F SYST BP LT 130 MM HG: CPT | Performed by: FAMILY MEDICINE

## 2022-12-13 PROCEDURE — G8417 CALC BMI ABV UP PARAM F/U: HCPCS | Performed by: FAMILY MEDICINE

## 2022-12-13 PROCEDURE — 3078F DIAST BP <80 MM HG: CPT | Performed by: FAMILY MEDICINE

## 2022-12-13 PROCEDURE — 3044F HG A1C LEVEL LT 7.0%: CPT | Performed by: FAMILY MEDICINE

## 2022-12-13 PROCEDURE — 2022F DILAT RTA XM EVC RTNOPTHY: CPT | Performed by: FAMILY MEDICINE

## 2022-12-13 RX ORDER — CHLORTHALIDONE 25 MG/1
TABLET ORAL
COMMUNITY
Start: 2022-12-09 | End: 2022-12-13 | Stop reason: SDUPTHER

## 2022-12-13 RX ORDER — CHLORTHALIDONE 25 MG/1
TABLET ORAL
Qty: 30 TABLET | Refills: 5 | Status: SHIPPED | OUTPATIENT
Start: 2022-12-13

## 2022-12-13 RX ORDER — BUSPIRONE HYDROCHLORIDE 15 MG/1
15 TABLET ORAL 2 TIMES DAILY
Qty: 60 TABLET | Refills: 5 | Status: SHIPPED | OUTPATIENT
Start: 2022-12-13

## 2022-12-13 ASSESSMENT — PATIENT HEALTH QUESTIONNAIRE - PHQ9
2. FEELING DOWN, DEPRESSED OR HOPELESS: 0
SUM OF ALL RESPONSES TO PHQ QUESTIONS 1-9: 0
SUM OF ALL RESPONSES TO PHQ9 QUESTIONS 1 & 2: 0
SUM OF ALL RESPONSES TO PHQ QUESTIONS 1-9: 0
SUM OF ALL RESPONSES TO PHQ QUESTIONS 1-9: 0
1. LITTLE INTEREST OR PLEASURE IN DOING THINGS: 0
SUM OF ALL RESPONSES TO PHQ QUESTIONS 1-9: 0

## 2022-12-13 NOTE — PROGRESS NOTES
SUBJECTIVE:   Mayra Cuellar is a 78 y.o. female who has a past medical history significant for hypertension, diabetes, high cholesterol, high triglycerides, vitamin D deficiency and recurrent depression. Presents today reporting that she stopped her HCTZ roughly a month ago because she saw something on TV as it was a recall. She began to develop headaches and feel poorly. No chest pain, visual loss or disturbance, slurred speech or muscle weakness reported. This culminated over the weekend with urgent care visit. She states that her blood pressure was very elevated and she was placed on chlorthalidone 25 mg a day. She states that her blood pressure has been positively responding and she feels better. However her headache still tend to be noted although much improved. She describes a lot of stress and tension with worsening anxiety. She feels like she is \"holding it all in my neck causing my head to hurt\". HPI  See above    Past Medical History, Past Surgical History, Family history, Social History, and Medications were all reviewed with the patient today and updated as necessary. Current Outpatient Medications   Medication Sig Dispense Refill    chlorthalidone (HYGROTON) 25 MG tablet TAKE 1 TABLET BY MOUTH ONCE DAILY FOR 30 DAYS      citalopram (CELEXA) 20 MG tablet Take 1 tablet by mouth daily 90 tablet 3    fenofibrate (TRIGLIDE) 160 MG tablet Take 1 tablet by mouth daily 90 tablet 3    olmesartan (BENICAR) 40 MG tablet Take 1 tablet by mouth daily 90 tablet 3    atorvastatin (LIPITOR) 10 MG tablet Take 1 tablet by mouth daily 90 tablet 3    Cholecalciferol 50 MCG (2000 UT) TABS Take by mouth      hydroCHLOROthiazide (HYDRODIURIL) 25 MG tablet Take 1 tablet by mouth daily (Patient not taking: Reported on 12/13/2022) 90 tablet 3    aspirin 325 MG tablet Take 325 mg by mouth (Patient not taking: No sig reported)       No current facility-administered medications for this visit.      No Known Allergies  Patient Active Problem List   Diagnosis    Vitamin D deficiency    Pure hypercholesterolemia    Pure hyperglyceridemia    Snoring    Status post total knee replacement, right    Osteoarthritis of right knee    Type 2 diabetes mellitus without complication ([address]    Depression    Osteoarthritis    Obesity, morbid ([address]    Essential hypertension with goal blood pressure less than 130/85     Past Medical History:   Diagnosis Date    Anxiety     Arthritis     Depression     Diabetes mellitus ([address]     type 2 per PCP note, oral med only;  pt denies, reports does not check BS    Hypercholesterolemia     Hypertension     Morbid obesity ([address]     Personal history of colonic polyps     Status post total knee replacement, right 3/6/2019    Vitamin D deficiency      Past Surgical History:   Procedure Laterality Date    BREAST BIOPSY Left     ?  (Stereo Bx - negative per pt)     SECTION      CHOLECYSTECTOMY, LAPAROSCOPIC      COLONOSCOPY  2012    polyp removed, given 3-4 year recall    CYST REMOVAL      neck    QUYNH AND BSO (CERVIX REMOVED)       Family History   Problem Relation Age of Onset    Breast Cancer Neg Hx     Hypertension Father     Heart Disease Father      Social History     Tobacco Use    Smoking status: Never    Smokeless tobacco: Never   Substance Use Topics    Alcohol use: No         Review of Systems  See above    OBJECTIVE:  /79   Ht 5' (1.524 m)   Wt 201 lb (91.2 kg)   BMI 39.26 kg/m²      Physical Exam  Constitutional:       General: She is not in acute distress. Appearance: Normal appearance. She is not ill-appearing. HENT:      Head: Normocephalic and atraumatic. Cardiovascular:      Rate and Rhythm: Normal rate and regular rhythm. Heart sounds: Normal heart sounds. No murmur heard. Pulmonary:      Effort: Pulmonary effort is normal.      Breath sounds: Normal breath sounds. No wheezing or rhonchi. Musculoskeletal:         General: Normal range of motion. Cervical back: Normal range of motion and neck supple. Right lower leg: No edema. Left lower leg: No edema. Comments: Neck is supple. There is some palpable discomfort in the trapezius muscle group bilaterally. No meningeal sign. Skin:     General: Skin is warm. Findings: No rash. Neurological:      Mental Status: She is alert and oriented to person, place, and time. Comments: Neurological exam is nonfocal.  Fundi benign. Psychiatric:         Mood and Affect: Mood normal.         Behavior: Behavior normal.         Thought Content: Thought content normal.         Judgment: Judgment normal.       Medical problems and test results were reviewed with the patient today. ASSESSMENT and PLAN    1. Hypertension. BP is 121/79. Continue chlorthalidone. 2.  Headaches. Much improved with improvement of blood pressure. However some residual tension type headache described. Neurologic exam nonfocal.  Treat with moist heat and gentle stretching. Address anxiety with BuSpar 15 mg twice a day added to her Celexa. Reevaluate in 4 to 6 weeks. Sooner if needed. 3.  Anxiety. As above. Biofeedback techniques discussed. 4.  Recurrent depression. As above. 5.  Diabetes. Most recent A1c at goal.  She is dietarily managed. Elements of this note have been dictated using speech recognition software. As a result, errors of speech recognition may have occurred.

## 2023-01-24 ENCOUNTER — OFFICE VISIT (OUTPATIENT)
Dept: FAMILY MEDICINE CLINIC | Facility: CLINIC | Age: 78
End: 2023-01-24
Payer: MEDICARE

## 2023-01-24 VITALS
HEART RATE: 61 BPM | OXYGEN SATURATION: 96 % | BODY MASS INDEX: 39.27 KG/M2 | DIASTOLIC BLOOD PRESSURE: 68 MMHG | HEIGHT: 60 IN | WEIGHT: 200 LBS | SYSTOLIC BLOOD PRESSURE: 110 MMHG

## 2023-01-24 DIAGNOSIS — E66.01 SEVERE OBESITY (BMI 35.0-39.9) WITH COMORBIDITY (HCC): ICD-10-CM

## 2023-01-24 DIAGNOSIS — E11.9 TYPE 2 DIABETES MELLITUS WITHOUT COMPLICATION, WITHOUT LONG-TERM CURRENT USE OF INSULIN (HCC): ICD-10-CM

## 2023-01-24 DIAGNOSIS — F41.9 ANXIETY: ICD-10-CM

## 2023-01-24 DIAGNOSIS — I10 PRIMARY HYPERTENSION: ICD-10-CM

## 2023-01-24 DIAGNOSIS — H69.83 DYSFUNCTION OF BOTH EUSTACHIAN TUBES: ICD-10-CM

## 2023-01-24 DIAGNOSIS — R51.9 HEADACHE, UNSPECIFIED HEADACHE TYPE: Primary | ICD-10-CM

## 2023-01-24 DIAGNOSIS — F33.9 RECURRENT DEPRESSION (HCC): ICD-10-CM

## 2023-01-24 DIAGNOSIS — H93.8X3 SENSATION OF FULLNESS IN BOTH EARS: ICD-10-CM

## 2023-01-24 PROCEDURE — G8417 CALC BMI ABV UP PARAM F/U: HCPCS | Performed by: FAMILY MEDICINE

## 2023-01-24 PROCEDURE — 3074F SYST BP LT 130 MM HG: CPT | Performed by: FAMILY MEDICINE

## 2023-01-24 PROCEDURE — 2022F DILAT RTA XM EVC RTNOPTHY: CPT | Performed by: FAMILY MEDICINE

## 2023-01-24 PROCEDURE — G8400 PT W/DXA NO RESULTS DOC: HCPCS | Performed by: FAMILY MEDICINE

## 2023-01-24 PROCEDURE — G8484 FLU IMMUNIZE NO ADMIN: HCPCS | Performed by: FAMILY MEDICINE

## 2023-01-24 PROCEDURE — 3017F COLORECTAL CA SCREEN DOC REV: CPT | Performed by: FAMILY MEDICINE

## 2023-01-24 PROCEDURE — G8428 CUR MEDS NOT DOCUMENT: HCPCS | Performed by: FAMILY MEDICINE

## 2023-01-24 PROCEDURE — 1123F ACP DISCUSS/DSCN MKR DOCD: CPT | Performed by: FAMILY MEDICINE

## 2023-01-24 PROCEDURE — 3046F HEMOGLOBIN A1C LEVEL >9.0%: CPT | Performed by: FAMILY MEDICINE

## 2023-01-24 PROCEDURE — 3078F DIAST BP <80 MM HG: CPT | Performed by: FAMILY MEDICINE

## 2023-01-24 PROCEDURE — 1036F TOBACCO NON-USER: CPT | Performed by: FAMILY MEDICINE

## 2023-01-24 PROCEDURE — 1090F PRES/ABSN URINE INCON ASSESS: CPT | Performed by: FAMILY MEDICINE

## 2023-01-24 PROCEDURE — 99214 OFFICE O/P EST MOD 30 MIN: CPT | Performed by: FAMILY MEDICINE

## 2023-01-24 ASSESSMENT — ANXIETY QUESTIONNAIRES
5. BEING SO RESTLESS THAT IT IS HARD TO SIT STILL: 0
2. NOT BEING ABLE TO STOP OR CONTROL WORRYING: 1
GAD7 TOTAL SCORE: 2
6. BECOMING EASILY ANNOYED OR IRRITABLE: 0
4. TROUBLE RELAXING: 0
1. FEELING NERVOUS, ANXIOUS, OR ON EDGE: 1
3. WORRYING TOO MUCH ABOUT DIFFERENT THINGS: 0
7. FEELING AFRAID AS IF SOMETHING AWFUL MIGHT HAPPEN: 0

## 2023-01-24 ASSESSMENT — PATIENT HEALTH QUESTIONNAIRE - PHQ9: DEPRESSION UNABLE TO ASSESS: FUNCTIONAL CAPACITY MOTIVATION LIMITS ACCURACY

## 2023-01-24 NOTE — PROGRESS NOTES
SUBJECTIVE:   Abrahan Nagel is a 78 y.o. female who has a past medical history significant for hypertension, diabetes, high cholesterol, high triglycerides, vitamin D deficiency and recurrent depression. At previous visit the patient had acknowledged worsening anxiety. Please refer to prior notes for details. She was also experiencing some headaches that were felt to be tension type muscular headaches. She was placed on BuSpar 15 mg twice a day added to her Celexa. She reports that this is helped and her headaches are much less frequent and intense. Her anxiety is much improved. However the patient reports today bilateral ear discomfort and fullness for about 2 weeks. She had a cough but this seems to have resolved. No fever or body aches reported. Patient reports no active chest pain, shortness of breath, orthopnea or PND. GI and  review of systems is unremarkable. HPI  See above    Past Medical History, Past Surgical History, Family history, Social History, and Medications were all reviewed with the patient today and updated as necessary. Current Outpatient Medications   Medication Sig Dispense Refill    busPIRone (BUSPAR) 15 MG tablet Take 15 mg by mouth 2 times daily 60 tablet 5    chlorthalidone (HYGROTON) 25 MG tablet TAKE 1 TABLET BY MOUTH ONCE DAILY FOR 30 DAYS 30 tablet 5    citalopram (CELEXA) 20 MG tablet Take 1 tablet by mouth daily 90 tablet 3    fenofibrate (TRIGLIDE) 160 MG tablet Take 1 tablet by mouth daily 90 tablet 3    olmesartan (BENICAR) 40 MG tablet Take 1 tablet by mouth daily 90 tablet 3    atorvastatin (LIPITOR) 10 MG tablet Take 1 tablet by mouth daily 90 tablet 3    Cholecalciferol 50 MCG (2000 UT) TABS Take by mouth       No current facility-administered medications for this visit.      No Known Allergies  Patient Active Problem List   Diagnosis    Vitamin D deficiency    Pure hypercholesterolemia    Pure hyperglyceridemia    Snoring    Status post total knee replacement, right    Osteoarthritis of right knee    Type 2 diabetes mellitus without complication ([address]    Depression    Osteoarthritis    Obesity, morbid ([address]    Essential hypertension with goal blood pressure less than 130/85     Past Medical History:   Diagnosis Date    Anxiety     Arthritis     Depression     Diabetes mellitus ([address]     type 2 per PCP note, oral med only;  pt denies, reports does not check BS    Hypercholesterolemia     Hypertension     Morbid obesity ([address]     Personal history of colonic polyps     Status post total knee replacement, right 3/6/2019    Vitamin D deficiency      Past Surgical History:   Procedure Laterality Date    BREAST BIOPSY Left     ?  (Stereo Bx - negative per pt)     SECTION      CHOLECYSTECTOMY, LAPAROSCOPIC      COLONOSCOPY  2012    polyp removed, given 3-4 year recall    CYST REMOVAL      neck    QUYNH AND BSO (CERVIX REMOVED)       Family History   Problem Relation Age of Onset    Breast Cancer Neg Hx     Hypertension Father     Heart Disease Father      Social History     Tobacco Use    Smoking status: Never    Smokeless tobacco: Never   Substance Use Topics    Alcohol use: No         Review of Systems  See above    OBJECTIVE:  /68   Pulse 61   Ht 5' (1.524 m)   Wt 200 lb (90.7 kg)   SpO2 96%   BMI 39.06 kg/m²      Physical Exam  Constitutional:       General: She is not in acute distress. Appearance: Normal appearance. She is not ill-appearing. HENT:      Head: Normocephalic and atraumatic. Ears:      Comments: TMs are dull bilaterally with clear effusions. Mouth/Throat:      Comments: OP is clear without exudate. Neck is supple without adenopathy. Cardiovascular:      Rate and Rhythm: Normal rate and regular rhythm. Heart sounds: Normal heart sounds. No murmur heard. Pulmonary:      Effort: Pulmonary effort is normal.      Breath sounds: Normal breath sounds. No wheezing or rhonchi.    Musculoskeletal: General: Normal range of motion. Cervical back: Normal range of motion and neck supple. Right lower leg: No edema. Left lower leg: No edema. Skin:     General: Skin is warm. Findings: No rash. Neurological:      Mental Status: She is alert and oriented to person, place, and time. Psychiatric:         Mood and Affect: Mood normal.         Behavior: Behavior normal.         Thought Content: Thought content normal.         Judgment: Judgment normal.       Medical problems and test results were reviewed with the patient today. ASSESSMENT and PLAN    1. Headaches. Much improved with the addressing of her stress. Continue. 2.  Hypertension. BP stable at 110/68. 3.  Anxiety. Much improved with the addition of BuSpar. Continue. Continue Celexa. 4.  Recurrent depression. As above. 5.  Bilateral eustachian tube dysfunction. Over-the-counter plain Mucinex and Nasacort. If symptoms of ear fullness persists she is to let me know. 6.  Bilateral ear fullness. As above. 7.  Diabetes. Most recent A1c 5.6.    8.  Obesity. BMI 39.0. Dietary counseling provided. Elements of this note have been dictated using speech recognition software. As a result, errors of speech recognition may have occurred.

## 2023-02-10 ENCOUNTER — TRANSCRIBE ORDERS (OUTPATIENT)
Dept: SCHEDULING | Age: 78
End: 2023-02-10

## 2023-02-10 DIAGNOSIS — Z12.31 SCREENING MAMMOGRAM FOR HIGH-RISK PATIENT: Primary | ICD-10-CM

## 2023-02-15 ENCOUNTER — OFFICE VISIT (OUTPATIENT)
Dept: FAMILY MEDICINE CLINIC | Facility: CLINIC | Age: 78
End: 2023-02-15

## 2023-02-15 VITALS
DIASTOLIC BLOOD PRESSURE: 62 MMHG | OXYGEN SATURATION: 98 % | SYSTOLIC BLOOD PRESSURE: 114 MMHG | HEART RATE: 61 BPM | BODY MASS INDEX: 39.38 KG/M2 | HEIGHT: 60 IN | WEIGHT: 200.6 LBS

## 2023-02-15 DIAGNOSIS — F41.9 ANXIETY: ICD-10-CM

## 2023-02-15 DIAGNOSIS — E55.9 VITAMIN D DEFICIENCY: ICD-10-CM

## 2023-02-15 DIAGNOSIS — J30.9 ALLERGIC RHINITIS, UNSPECIFIED SEASONALITY, UNSPECIFIED TRIGGER: ICD-10-CM

## 2023-02-15 DIAGNOSIS — H69.81 ACUTE DYSFUNCTION OF RIGHT EUSTACHIAN TUBE: ICD-10-CM

## 2023-02-15 DIAGNOSIS — E11.9 TYPE 2 DIABETES MELLITUS WITHOUT COMPLICATION, WITHOUT LONG-TERM CURRENT USE OF INSULIN (HCC): ICD-10-CM

## 2023-02-15 DIAGNOSIS — I10 PRIMARY HYPERTENSION: ICD-10-CM

## 2023-02-15 DIAGNOSIS — E78.00 PURE HYPERCHOLESTEROLEMIA: ICD-10-CM

## 2023-02-15 DIAGNOSIS — Z78.0 MENOPAUSE: ICD-10-CM

## 2023-02-15 DIAGNOSIS — F33.9 RECURRENT DEPRESSION (HCC): ICD-10-CM

## 2023-02-15 DIAGNOSIS — H92.01 ACUTE OTALGIA, RIGHT: ICD-10-CM

## 2023-02-15 DIAGNOSIS — Z12.11 COLON CANCER SCREENING: ICD-10-CM

## 2023-02-15 DIAGNOSIS — Z00.00 MEDICARE ANNUAL WELLNESS VISIT, SUBSEQUENT: Primary | ICD-10-CM

## 2023-02-15 DIAGNOSIS — E66.01 OBESITY, MORBID (HCC): ICD-10-CM

## 2023-02-15 LAB
BASOPHILS # BLD: 0.1 K/UL (ref 0–0.2)
BASOPHILS NFR BLD: 1 % (ref 0–2)
DIFFERENTIAL METHOD BLD: NORMAL
EOSINOPHIL # BLD: 0.2 K/UL (ref 0–0.8)
EOSINOPHIL NFR BLD: 2 % (ref 0.5–7.8)
ERYTHROCYTE [DISTWIDTH] IN BLOOD BY AUTOMATED COUNT: 14.4 % (ref 11.9–14.6)
EST. AVERAGE GLUCOSE BLD GHB EST-MCNC: 120 MG/DL
HBA1C MFR BLD: 5.8 % (ref 4.8–5.6)
HCT VFR BLD AUTO: 42.9 % (ref 35.8–46.3)
HGB BLD-MCNC: 14.1 G/DL (ref 11.7–15.4)
IMM GRANULOCYTES # BLD AUTO: 0.1 K/UL (ref 0–0.5)
IMM GRANULOCYTES NFR BLD AUTO: 1 % (ref 0–5)
LYMPHOCYTES # BLD: 3.4 K/UL (ref 0.5–4.6)
LYMPHOCYTES NFR BLD: 38 % (ref 13–44)
MCH RBC QN AUTO: 32.1 PG (ref 26.1–32.9)
MCHC RBC AUTO-ENTMCNC: 32.9 G/DL (ref 31.4–35)
MCV RBC AUTO: 97.7 FL (ref 82–102)
MICROALB/CREAT RATIO, POC: ABNORMAL
MICROALBUMIN URINE, POC: 20 MG/L
MONOCYTES # BLD: 0.9 K/UL (ref 0.1–1.3)
MONOCYTES NFR BLD: 10 % (ref 4–12)
NEUTS SEG # BLD: 4.3 K/UL (ref 1.7–8.2)
NEUTS SEG NFR BLD: 48 % (ref 43–78)
NRBC # BLD: 0 K/UL (ref 0–0.2)
PLATELET # BLD AUTO: 334 K/UL (ref 150–450)
PMV BLD AUTO: 10 FL (ref 9.4–12.3)
RBC # BLD AUTO: 4.39 M/UL (ref 4.05–5.2)
WBC # BLD AUTO: 8.9 K/UL (ref 4.3–11.1)

## 2023-02-15 RX ORDER — TRIAMCINOLONE ACETONIDE 40 MG/ML
40 INJECTION, SUSPENSION INTRA-ARTICULAR; INTRAMUSCULAR ONCE
Status: COMPLETED | OUTPATIENT
Start: 2023-02-15 | End: 2023-02-15

## 2023-02-15 RX ORDER — BUSPIRONE HYDROCHLORIDE 15 MG/1
15 TABLET ORAL 2 TIMES DAILY
Qty: 180 TABLET | Refills: 3 | Status: SHIPPED | OUTPATIENT
Start: 2023-02-15

## 2023-02-15 RX ORDER — CHLORTHALIDONE 25 MG/1
TABLET ORAL
Qty: 90 TABLET | Refills: 3 | Status: SHIPPED | OUTPATIENT
Start: 2023-02-15

## 2023-02-15 RX ADMIN — TRIAMCINOLONE ACETONIDE 40 MG: 40 INJECTION, SUSPENSION INTRA-ARTICULAR; INTRAMUSCULAR at 12:22

## 2023-02-15 SDOH — ECONOMIC STABILITY: FOOD INSECURITY: WITHIN THE PAST 12 MONTHS, YOU WORRIED THAT YOUR FOOD WOULD RUN OUT BEFORE YOU GOT MONEY TO BUY MORE.: NEVER TRUE

## 2023-02-15 SDOH — ECONOMIC STABILITY: INCOME INSECURITY: HOW HARD IS IT FOR YOU TO PAY FOR THE VERY BASICS LIKE FOOD, HOUSING, MEDICAL CARE, AND HEATING?: NOT VERY HARD

## 2023-02-15 SDOH — ECONOMIC STABILITY: FOOD INSECURITY: WITHIN THE PAST 12 MONTHS, THE FOOD YOU BOUGHT JUST DIDN'T LAST AND YOU DIDN'T HAVE MONEY TO GET MORE.: NEVER TRUE

## 2023-02-15 SDOH — ECONOMIC STABILITY: HOUSING INSECURITY
IN THE LAST 12 MONTHS, WAS THERE A TIME WHEN YOU DID NOT HAVE A STEADY PLACE TO SLEEP OR SLEPT IN A SHELTER (INCLUDING NOW)?: NO

## 2023-02-15 ASSESSMENT — LIFESTYLE VARIABLES
HOW MANY STANDARD DRINKS CONTAINING ALCOHOL DO YOU HAVE ON A TYPICAL DAY: PATIENT DOES NOT DRINK
HOW OFTEN DO YOU HAVE A DRINK CONTAINING ALCOHOL: NEVER

## 2023-02-15 ASSESSMENT — PATIENT HEALTH QUESTIONNAIRE - PHQ9
SUM OF ALL RESPONSES TO PHQ QUESTIONS 1-9: 1
10. IF YOU CHECKED OFF ANY PROBLEMS, HOW DIFFICULT HAVE THESE PROBLEMS MADE IT FOR YOU TO DO YOUR WORK, TAKE CARE OF THINGS AT HOME, OR GET ALONG WITH OTHER PEOPLE: 0
8. MOVING OR SPEAKING SO SLOWLY THAT OTHER PEOPLE COULD HAVE NOTICED. OR THE OPPOSITE, BEING SO FIGETY OR RESTLESS THAT YOU HAVE BEEN MOVING AROUND A LOT MORE THAN USUAL: 0
SUM OF ALL RESPONSES TO PHQ9 QUESTIONS 1 & 2: 0
1. LITTLE INTEREST OR PLEASURE IN DOING THINGS: 0
5. POOR APPETITE OR OVEREATING: 0
9. THOUGHTS THAT YOU WOULD BE BETTER OFF DEAD, OR OF HURTING YOURSELF: 0
2. FEELING DOWN, DEPRESSED OR HOPELESS: 0
6. FEELING BAD ABOUT YOURSELF - OR THAT YOU ARE A FAILURE OR HAVE LET YOURSELF OR YOUR FAMILY DOWN: 0
SUM OF ALL RESPONSES TO PHQ QUESTIONS 1-9: 1
4. FEELING TIRED OR HAVING LITTLE ENERGY: 1
7. TROUBLE CONCENTRATING ON THINGS, SUCH AS READING THE NEWSPAPER OR WATCHING TELEVISION: 0
SUM OF ALL RESPONSES TO PHQ QUESTIONS 1-9: 1
3. TROUBLE FALLING OR STAYING ASLEEP: 0
SUM OF ALL RESPONSES TO PHQ QUESTIONS 1-9: 1

## 2023-02-15 NOTE — PROGRESS NOTES
SUBJECTIVE:   Alcira Valentin is a 78 y.o. female who has a past medical history significant for hypertension, diabetes, high cholesterol, high triglycerides, vitamin D deficiency, anxiety, allergic rhinitis and recurrent depression. Review of systems reveals that she is complaining of persistent right otalgia and head fullness. Was diagnosed at previous visit with eustachian tube dysfunction and treated with Nasacort and Mucinex. Patient reports compliance but lack of improvement in symptoms. She denies fever, headache, yellow-green nasal drainage, chest pain, shortness of breath, orthopnea or PND. She does report and review of systems that the addition of the BuSpar continues to help her anxiety. Patient overall is doing well other than her sinuses and allergies being flared up. HPI  See above    Past Medical History, Past Surgical History, Family history, Social History, and Medications were all reviewed with the patient today and updated as necessary.        Current Outpatient Medications   Medication Sig Dispense Refill    busPIRone (BUSPAR) 15 MG tablet Take 15 mg by mouth 2 times daily 180 tablet 3    chlorthalidone (HYGROTON) 25 MG tablet TAKE 1 TABLET BY MOUTH ONCE DAILY FOR 30 DAYS 90 tablet 3    citalopram (CELEXA) 20 MG tablet Take 1 tablet by mouth daily 90 tablet 3    fenofibrate (TRIGLIDE) 160 MG tablet Take 1 tablet by mouth daily 90 tablet 3    olmesartan (BENICAR) 40 MG tablet Take 1 tablet by mouth daily 90 tablet 3    atorvastatin (LIPITOR) 10 MG tablet Take 1 tablet by mouth daily 90 tablet 3    Cholecalciferol 50 MCG (2000 UT) TABS Take by mouth       Current Facility-Administered Medications   Medication Dose Route Frequency Provider Last Rate Last Admin    triamcinolone acetonide (KENALOG-40) injection 40 mg  40 mg IntraMUSCular Once Sanna Harmon MD         No Known Allergies  Patient Active Problem List   Diagnosis    Vitamin D deficiency    Pure hypercholesterolemia    Pure hyperglyceridemia    Snoring    Status post total knee replacement, right    Osteoarthritis of right knee    Type 2 diabetes mellitus without complication ([facility])    Depression    Osteoarthritis    Obesity, morbid ([address]    Essential hypertension with goal blood pressure less than 130/85     Past Medical History:   Diagnosis Date    Anxiety     Arthritis     Depression     Diabetes mellitus ([address]     type 2 per PCP note, oral med only;  pt denies, reports does not check BS    Hypercholesterolemia     Hypertension     Morbid obesity ([address]     Personal history of colonic polyps     Status post total knee replacement, right 3/6/2019    Vitamin D deficiency      Past Surgical History:   Procedure Laterality Date    BREAST BIOPSY Left     ?  (Stereo Bx - negative per pt)     SECTION      CHOLECYSTECTOMY, LAPAROSCOPIC      COLONOSCOPY  2012    polyp removed, given 3-4 year recall    CYST REMOVAL      neck    QUYNH AND BSO (CERVIX REMOVED)       Family History   Problem Relation Age of Onset    Breast Cancer Neg Hx     Hypertension Father     Heart Disease Father      Social History     Tobacco Use    Smoking status: Never    Smokeless tobacco: Never   Substance Use Topics    Alcohol use: No         Review of Systems  See above    OBJECTIVE:  /62   Pulse 61   Ht 5' (1.524 m)   Wt 200 lb 9.6 oz (91 kg)   SpO2 98%   BMI 39.18 kg/m²      Physical Exam  Constitutional:       General: She is not in acute distress. Appearance: Normal appearance. She is obese. She is not ill-appearing. HENT:      Head: Normocephalic and atraumatic. Right Ear: Ear canal and external ear normal. There is no impacted cerumen. Left Ear: Ear canal and external ear normal. There is no impacted cerumen. Ears:      Comments: Bilateral clear effusions noted. Nose: Nose normal.      Mouth/Throat:      Mouth: Mucous membranes are moist.      Pharynx: Oropharynx is clear.  No oropharyngeal exudate or posterior oropharyngeal erythema. Eyes:      General: No scleral icterus. Extraocular Movements: Extraocular movements intact. Conjunctiva/sclera: Conjunctivae normal.      Pupils: Pupils are equal, round, and reactive to light. Neck:      Vascular: No carotid bruit. Cardiovascular:      Rate and Rhythm: Normal rate and regular rhythm. Pulses: Normal pulses. Heart sounds: Normal heart sounds. No murmur heard. Pulmonary:      Effort: Pulmonary effort is normal. No respiratory distress. Breath sounds: Normal breath sounds. No wheezing. Abdominal:      General: Abdomen is flat. Bowel sounds are normal. There is no distension. Palpations: Abdomen is soft. There is no mass. Tenderness: There is no abdominal tenderness. Hernia: No hernia is present. Musculoskeletal:         General: No swelling, tenderness or deformity. Normal range of motion. Cervical back: Normal range of motion and neck supple. Right lower leg: No edema. Left lower leg: No edema. Lymphadenopathy:      Cervical: No cervical adenopathy. Skin:     General: Skin is warm. Findings: No rash. Neurological:      General: No focal deficit present. Mental Status: She is alert and oriented to person, place, and time. Cranial Nerves: No cranial nerve deficit. Motor: No weakness. Deep Tendon Reflexes: Reflexes normal.   Psychiatric:         Mood and Affect: Mood normal.         Behavior: Behavior normal.         Thought Content: Thought content normal.         Judgment: Judgment normal.       Medical problems and test results were reviewed with the patient today. ASSESSMENT and PLAN    1. Right otalgia. Underlying eustachian tube dysfunction. 2.  Bilateral eustachian tube dysfunction. 3.  Allergic rhinitis. 1 cc of Kenalog IM. Continue Mucinex and Nasacort. 4.  Hypertension. Check metabolic panel, TSH and CBC. /62. 5.  Diabetes.   Check microalbumin and A1c. Encourage yearly retinal exams and daily feet exams. 6.  High cholesterol. Check lipid panel. 7.  High triglycerides. As above. 8.  Vitamin D deficiency. Check vitamin D level. 9.  Recurrent depression. No breakthrough complaints reported. 10.  Anxiety. Improved with the addition of BuSpar. Continue. 11.  Severe obesity. BMI 39.1. Dietary counseling provided. 12.  Health maintenance. Refer for screening colonoscopy and bone density. Elements of this note have been dictated using speech recognition software. As a result, errors of speech recognition may have occurred.

## 2023-02-15 NOTE — PROGRESS NOTES
Medicare Annual Wellness Visit    Ananya Cooper is here for Medicare AWV (Subsequent) and Otalgia (Right ear pain since previous visit)    Assessment & Plan   Medicare annual wellness visit, subsequent      Recommendations for Preventive Services Due: see orders and patient instructions/AVS.  Recommended screening schedule for the next 5-10 years is provided to the patient in written form: see Patient Instructions/AVS.     Return for Medicare Annual Wellness Visit in 1 year. Subjective   The following acute and/or chronic problems were also addressed today:  Allergic rhinitis, hypertension, diabetes, high cholesterol, high triglycerides, recurrent depression, anxiety and vitamin D deficiency    Patient's complete Health Risk Assessment and screening values have been reviewed and are found in Flowsheets. The following problems were reviewed today and where indicated follow up appointments were made and/or referrals ordered. Positive Risk Factor Screenings with Interventions:               General HRA Questions:  Select all that apply: (!) Stress, New or Increased Fatigue    Fatigue Interventions:  Patient declined any further interventions or treatment    Stress Interventions: BuSpar has been added. This is helping according to the patient. Weight and Activity:  Physical Activity: Insufficiently Active    Days of Exercise per Week: 2 days    Minutes of Exercise per Session: 20 min     On average, how many days per week do you engage in moderate to strenuous exercise (like a brisk walk)?: 2 days  Have you lost any weight without trying in the past 3 months?: No  Body mass index: (!) 39.17    Obesity Interventions:  Proper diet and exercise recommended            Vision Screen:  Do you have difficulty driving, watching TV, or doing any of your daily activities because of your eyesight?: No  Have you had an eye exam within the past year?: (!) No  No results found.     Interventions:   Patient encouraged to make appointment with their eye specialist      Advanced Directives:  Do you have a Living Will?: (!) No    Intervention:  has NO advanced directive - information provided                       Objective   Vitals:    02/15/23 1006   BP: 114/62   Pulse: 61   SpO2: 98%   Weight: 200 lb 9.6 oz (91 kg)   Height: 5' (1.524 m)      Body mass index is 39.18 kg/m². General Appearance: alert and oriented to person, place and time, well developed and well- nourished, in no acute distress  Skin: warm and dry, no rash or erythema  Head: normocephalic and atraumatic  Eyes: pupils equal, round, and reactive to light, extraocular eye movements intact, conjunctivae normal  ENT: tympanic membrane, external ear and ear canal normal bilaterally, nose without deformity, nasal mucosa and turbinates normal without polyps  Neck: supple and non-tender without mass, no thyromegaly or thyroid nodules, no cervical lymphadenopathy  Pulmonary/Chest: clear to auscultation bilaterally- no wheezes, rales or rhonchi, normal air movement, no respiratory distress  Cardiovascular: normal rate, regular rhythm, normal S1 and S2, no murmurs, rubs, clicks, or gallops, distal pulses intact, no carotid bruits  Abdomen: soft, non-tender, non-distended, normal bowel sounds, no masses or organomegaly  Extremities: no cyanosis, clubbing or edema  Musculoskeletal: normal range of motion, no joint swelling, deformity or tenderness  Neurologic: reflexes normal and symmetric, no cranial nerve deficit, gait, coordination and speech normal       No Known Allergies  Prior to Visit Medications    Medication Sig Taking?  Authorizing Provider   busPIRone (BUSPAR) 15 MG tablet Take 15 mg by mouth 2 times daily Yes Susana Harvey MD   chlorthalidone (HYGROTON) 25 MG tablet TAKE 1 TABLET BY MOUTH ONCE DAILY FOR 30 DAYS Yes Susana Harvey MD   citalopram (CELEXA) 20 MG tablet Take 1 tablet by mouth daily Yes Susana Harvey MD   fenofibrate (TRIGLIDE) 160 MG tablet Take 1 tablet by mouth daily Yes Kelby Mohs, MD   olmesartan [facility]) 40 MG tablet Take 1 tablet by mouth daily Yes Kelby Mohs, MD   atorvastatin (LIPITOR) 10 MG tablet Take 1 tablet by mouth daily Yes Kelby Mohs, MD   Cholecalciferol 50 MCG (2000 UT) TABS Take by mouth Yes Ar Automatic Reconciliation       CareTeam (Including outside providers/suppliers regularly involved in providing care):   Patient Care Team:  Kelby Mohs, MD as PCP - Jarvis Gan MD as PCP - Empaneled Provider     Reviewed and updated this visit:  Tobacco  Allergies  Meds  Med Hx  Surg Hx  Soc Hx  Fam Hx             Hermes Menchaca MD

## 2023-02-15 NOTE — PATIENT INSTRUCTIONS
Fatigue: Care Instructions  Your Care Instructions     Fatigue is a feeling of tiredness, exhaustion, or lack of energy. You may feel fatigue because of too much or not enough activity. It can also come from stress, lack of sleep, boredom, and poor diet. Many medical problems, such as viral infections, can cause fatigue. Emotional problems, especially depression, are often the cause of fatigue. Fatigue is most often a symptom of another problem. Treatment for fatigue depends on the cause. For example, if you have fatigue because you have a certain health problem, treating this problem also treats your fatigue. If depression or anxiety is the cause, treatment may help. Follow-up care is a key part of your treatment and safety. Be sure to make and go to all appointments, and call your doctor if you are having problems. It's also a good idea to know your test results and keep a list of the medicines you take. How can you care for yourself at home? Get regular exercise. But don't overdo it. Go back and forth between rest and exercise. Get plenty of rest.  Eat a healthy diet. Do not skip meals, especially breakfast.  Reduce your use of caffeine, tobacco, and alcohol. Caffeine is most often found in coffee, tea, cola drinks, and chocolate. Limit medicines that can cause fatigue. This includes tranquilizers and cold and allergy medicines. When should you call for help? Watch closely for changes in your health, and be sure to contact your doctor if:    You have new symptoms such as fever or a rash.     Your fatigue gets worse.     You have been feeling down, depressed, or hopeless. Or you may have lost interest in things that you usually enjoy.     You are not getting better as expected. Where can you learn more? Go to http://www.woods.com/ and enter V038 to learn more about \"Fatigue: Care Instructions. \"  Current as of: February 9, 2022               Content Version: 13.5  © 4773-1839 Healthwise, Incorporated. Care instructions adapted under license by [facility] ([facility]). If you have questions about a medical condition or this instruction, always ask your healthcare professional. [address] any warranty or liability for your use of this information. Learning About Stress  What is stress? Stress is what you feel when you have to handle more than you are used to. Stress is a fact of life for most people, and it affects everyone differently. What causes stress for you may not be stressful for someone else. A lot of things can cause stress. You may feel stress when you go on a job interview, take a test, or run a race. This kind of short-term stress is normal and even useful. It can help you if you need to work hard or react quickly. For example, stress can help you finish an important job on time. Stress also can last a long time. Long-term stress is caused by stressful situations or events. Examples of long-term stress include long-term health problems, ongoing problems at work, or conflicts in your family. Long-term stress can harm your health. How does stress affect your health? When you are stressed, your body responds as though you are in danger. It makes hormones that speed up your heart, make you breathe faster, and give you a burst of energy. This is called the fight-or-flight stress response. If the stress is over quickly, your body goes back to normal and no harm is done. But if stress happens too often or lasts too long, it can have bad effects. Long-term stress can make you more likely to get sick, and it can make symptoms of some diseases worse. If you tense up when you are stressed, you may develop neck, shoulder, or low back pain. Stress is linked to high blood pressure and heart disease. Stress also harms your emotional health. It can make you erzao, tense, or depressed.  Your relationships may suffer, and you may not do well at work or school. What can you do to manage stress? How to relax your mind   Write. It may help to write about things that are bothering you. This helps you find out how much stress you feel and what is causing it. When you know this, you can find better ways to cope. Let your feelings out. Talk, laugh, cry, and express anger when you need to. Talking with friends, family, a counselor, or a member of the clergy about your feelings is a healthy way to relieve stress. Do something you enjoy. For example, listen to music or go to a movie. Practice your hobby or do volunteer work. Meditate. This can help you relax, because you are not worrying about what happened before or what may happen in the future. Do guided imagery. Imagine yourself in any setting that helps you feel calm. You can use audiotapes, books, or a teacher to guide you. How to relax your body   Do something active. Exercise or activity can help reduce stress. Walking is a great way to get started. Even everyday activities such as housecleaning or yard work can help. Do breathing exercises. For example:  From a standing position, bend forward from the waist with your knees slightly bent. Let your arms dangle close to the floor. Breathe in slowly and deeply as you return to a standing position. Roll up slowly and lift your head last.  Hold your breath for just a few seconds in the standing position. Breathe out slowly and bend forward from the waist.  Try yoga or lily chi. These techniques combine exercise and meditation. You may need some training at first to learn them. What can you do to prevent stress? Manage your time. This helps you find time to do the things you want and need to do. Get enough sleep. Your body recovers from the stresses of the day while you are sleeping. Get support. Your family, friends, and community can make a difference in how you experience stress. Where can you learn more?   Go to http://www.woods.com/ and enter K875 to learn more about \"Learning About Stress. \"  Current as of: October 6, 2021               Content Version: 13.5  © 2006-2022 Healthwise, [employer]. Care instructions adapted under license by [facility] ([facility]). If you have questions about a medical condition or this instruction, always ask your healthcare professional. [facility] [address] [address] any warranty or liability for your use of this information. Learning About Vision Tests  What are vision tests? The four most common vision tests are visual acuity tests, refraction, visual field tests, and color vision tests. Visual acuity (sharpness) tests  These tests are used: To see if you need glasses or contact lenses. To monitor an eye problem. To check an eye injury. Visual acuity tests are done as part of routine exams. You may also have this test when you get your 's license or apply for some types of jobs. Visual field tests  These tests are used: To check for vision loss in any area of your range of vision. To screen for certain eye diseases. To look for nerve damage after a stroke, head injury, or other problem that could reduce blood flow to the brain. Refraction and color tests  A refraction test is done to find the right prescription for glasses and contact lenses. A color vision test is done to check for color blindness. Color vision is often tested as part of a routine exam. You may also have this test when you apply for a job where recognizing different colors is important, such as , electronics, or the [employer]. How are vision tests done? Visual acuity test   You cover one eye at a time. You read aloud from a wall chart across the room. You read aloud from a small card that you hold in your hand. Refraction   You look into a special device. The device puts lenses of different strengths in front of each eye to see how strong your glasses or contact lenses need to be.   Visual field tests Your doctor may have you look through special machines. Or your doctor may simply have you stare straight ahead while they move a finger into and out of your field of vision. Color vision test   You look at pieces of printed test patterns in various colors. You say what number or symbol you see. Your doctor may have you trace the number or symbol using a pointer. How do these tests feel? There is very little chance of having a problem from this test. If dilating drops are used for a vision test, they may make the eyes sting and cause a medicine taste in the mouth. Follow-up care is a key part of your treatment and safety. Be sure to make and go to all appointments, and call your doctor if you are having problems. It's also a good idea to know your test results and keep a list of the medicines you take. Where can you learn more? Go to http://[employer].hall.com/ and enter G551 to learn more about \"Learning About Vision Tests. \"  Current as of: October 12, 2022               Content Version: 13.5  © 2006-2022 [employer]. Care instructions adapted under license by [address] [place] [address]. If you have questions about a medical condition or this instruction, always ask your healthcare professional. [place] [address] any warranty or liability for your use of this information. Advance Directives: Care Instructions  Overview  An advance directive is a legal way to state your wishes at the end of your life. It tells your family and your doctor what to do if you can't say what you want. There are two main types of advance directives. You can change them any time your wishes change. Living will. This form tells your family and your doctor your wishes about life support and other treatment. The form is also called a declaration. Medical power of . This form lets you name a person to make treatment decisions for you when you can't speak for yourself.  This person is called a health care agent (health care proxy, health care surrogate). The form is also called a durable power of  for health care.  If you do not have an advance directive, decisions about your medical care may be made by a family member, or by a doctor or a  who doesn't know you.  It may help to think of an advance directive as a gift to the people who care for you. If you have one, they won't have to make tough decisions by themselves.  For more information, including forms for your state, see the [employer] website (www.caringinfo.org/planning/advance-directives/).  Follow-up care is a key part of your treatment and safety. Be sure to make and go to all appointments, and call your doctor if you are having problems. It's also a good idea to know your test results and keep a list of the medicines you take.  What should you include in an advance directive?  Many states have a unique advance directive form. (It may ask you to address specific issues.) Or you might use a universal form that's approved by many states.  If your form doesn't tell you what to address, it may be hard to know what to include in your advance directive. Use the questions below to help you get started.  Who do you want to make decisions about your medical care if you are not able to?  What life-support measures do you want if you have a serious illness that gets worse over time or can't be cured?  What are you most afraid of that might happen? (Maybe you're afraid of having pain, losing your independence, or being kept alive by machines.)  Where would you prefer to die? (Your home? A hospital? A nursing home?)  Do you want to donate your organs when you die?  Do you want certain Moravian practices performed before you die?  When should you call for help?  Be sure to contact your doctor if you have any questions.  Where can you learn more?  Go to https://www.healthwise.net/patientEd and enter R264 to learn more about \"Advance  Directives: Care Instructions. \"  Current as of: June 16, 2022               Content Version: 13.5  © 2006-2022 Healthwise, [employer]. Care instructions adapted under license by [facility] ([facility]). If you have questions about a medical condition or this instruction, always ask your healthcare professional. [address] any warranty or liability for your use of this information. Starting a Weight Loss Plan: Care Instructions  Overview     If you're thinking about losing weight, it can be hard to know where to start. Your doctor can help you set up a weight loss plan that best meets your needs. You may want to take a class on nutrition or exercise, or you could join a weight loss support group. If you have questions about how to make changes to your eating or exercise habits, ask your doctor about seeing a registered dietitian or an exercise specialist.  It can be a big challenge to lose weight. But you don't have to make huge changes at once. Make small changes, and stick with them. When those changes become habit, add a few more changes. If you don't think you're ready to make changes right now, try to pick a date in the future. Make an appointment to see your doctor to discuss whether the time is right for you to start a plan. Follow-up care is a key part of your treatment and safety. Be sure to make and go to all appointments, and call your doctor if you are having problems. It's also a good idea to know your test results and keep a list of the medicines you take. How can you care for yourself at home? Set realistic goals. Many people expect to lose much more weight than is likely. A weight loss of 5% to 10% of your body weight may be enough to improve your health. Get family and friends involved to provide support. Talk to them about why you are trying to lose weight, and ask them to help.  They can help by participating in exercise and having meals with you, even if they may be eating something different. Find what works best for you. If you do not have time or do not like to cook, a program that offers meal replacement bars or shakes may be better for you. Or if you like to prepare meals, finding a plan that includes daily menus and recipes may be best.  Ask your doctor about other health professionals who can help you achieve your weight loss goals. A dietitian can help you make healthy changes in your diet. An exercise specialist or  can help you develop a safe and effective exercise program.  A counselor or psychiatrist can help you cope with issues such as depression, anxiety, or family problems that can make it hard to focus on weight loss. Consider joining a support group for people who are trying to lose weight. Your doctor can suggest groups in your area. Where can you learn more? Go to http://www.woods.com/ and enter U357 to learn more about \"Starting a Weight Loss Plan: Care Instructions. \"  Current as of: August 25, 2022               Content Version: 13.5  © 2006-2022 [employer]. Care instructions adapted under license by [facility] ([facility]). If you have questions about a medical condition or this instruction, always ask your healthcare professional. [place] [address] any warranty or liability for your use of this information. A Healthy Heart: Care Instructions  Your Care Instructions     Coronary artery disease, also called heart disease, occurs when a substance called plaque builds up in the vessels that supply oxygen-rich blood to your heart muscle. This can narrow the blood vessels and reduce blood flow. A heart attack happens when blood flow is completely blocked. A high-fat diet, smoking, and other factors increase the risk of heart disease. Your doctor has found that you have a chance of having heart disease. You can do lots of things to keep your heart healthy.  It may not be easy, but you can change your diet, exercise more, and quit smoking. These steps really work to lower your chance of heart disease. Follow-up care is a key part of your treatment and safety. Be sure to make and go to all appointments, and call your doctor if you are having problems. It's also a good idea to know your test results and keep a list of the medicines you take. How can you care for yourself at home? Diet    Use less salt when you cook and eat. This helps lower your blood pressure. Taste food before salting. Add only a little salt when you think you need it. With time, your taste buds will adjust to less salt.     Eat fewer snack items, fast foods, canned soups, and other high-salt, high-fat, processed foods.     Read food labels and try to avoid saturated and trans fats. They increase your risk of heart disease by raising cholesterol levels.     Limit the amount of solid fat-butter, margarine, and shortening-you eat. Use olive, peanut, or canola oil when you cook. Bake, broil, and steam foods instead of frying them.     Eat a variety of fruit and vegetables every day. Dark green, deep orange, red, or yellow fruits and vegetables are especially good for you. Examples include spinach, carrots, peaches, and berries.     Foods high in fiber can reduce your cholesterol and provide important vitamins and minerals. High-fiber foods include whole-grain cereals and breads, oatmeal, beans, brown rice, citrus fruits, and apples.     Eat lean proteins. Heart-healthy proteins include seafood, lean meats and poultry, eggs, beans, peas, nuts, seeds, and soy products.     Limit drinks and foods with added sugar. These include candy, desserts, and soda pop. Lifestyle changes    If your doctor recommends it, get more exercise. Walking is a good choice. Bit by bit, increase the amount you walk every day. Try for at least 30 minutes on most days of the week. You also may want to swim, bike, or do other activities.     Do not smoke.  If you need help quitting, talk to your doctor about stop-smoking programs and medicines. These can increase your chances of quitting for good. Quitting smoking may be the most important step you can take to protect your heart. It is never too late to quit.     Limit alcohol to 2 drinks a day for men and 1 drink a day for women. Too much alcohol can cause health problems.     Manage other health problems such as diabetes, high blood pressure, and high cholesterol. If you think you may have a problem with alcohol or drug use, talk to your doctor. Medicines    Take your medicines exactly as prescribed. Call your doctor if you think you are having a problem with your medicine.     If your doctor recommends aspirin, take the amount directed each day. Make sure you take aspirin and not another kind of pain reliever, such as acetaminophen (Tylenol). When should you call for help? Call 911 if you have symptoms of a heart attack. These may include:    Chest pain or pressure, or a strange feeling in the chest.     Sweating.     Shortness of breath.     Pain, pressure, or a strange feeling in the back, neck, jaw, or upper belly or in one or both shoulders or arms.     Lightheadedness or sudden weakness.     A fast or irregular heartbeat. After you call 911, the  may tell you to chew 1 adult-strength or 2 to 4 low-dose aspirin. Wait for an ambulance. Do not try to drive yourself. Watch closely for changes in your health, and be sure to contact your doctor if you have any problems. Where can you learn more? Go to http://www.hall.com/ and enter F075 to learn more about \"A Healthy Heart: Care Instructions. \"  Current as of: September 7, 2022               Content Version: 13.5  © 0404-2084 Healthwise, Incorporated. Care instructions adapted under license by [facility] ([facility]).  If you have questions about a medical condition or this instruction, always ask your healthcare professional. [employer] disclaims any warranty or liability for your use of this information. Personalized Preventive Plan for Florentin Thomas - 2/15/2023  Medicare offers a range of preventive health benefits. Some of the tests and screenings are paid in full while other may be subject to a deductible, co-insurance, and/or copay. Some of these benefits include a comprehensive review of your medical history including lifestyle, illnesses that may run in your family, and various assessments and screenings as appropriate. After reviewing your medical record and screening and assessments performed today your provider may have ordered immunizations, labs, imaging, and/or referrals for you. A list of these orders (if applicable) as well as your Preventive Care list are included within your After Visit Summary for your review. Other Preventive Recommendations:    A preventive eye exam performed by an eye specialist is recommended every 1-2 years to screen for glaucoma; cataracts, macular degeneration, and other eye disorders. A preventive dental visit is recommended every 6 months. Try to get at least 150 minutes of exercise per week or 10,000 steps per day on a pedometer . Order or download the FREE \"Exercise & Physical Activity: Your Everyday Guide\" from [employer]. Call 6-156.497.8199 or search [employer] online. You need 5549-9036 mg of calcium and 7870-9544 IU of vitamin D per day. It is possible to meet your calcium requirement with diet alone, but a vitamin D supplement is usually necessary to meet this goal.  When exposed to the sun, use a sunscreen that protects against both UVA and UVB radiation with an SPF of 30 or greater. Reapply every 2 to 3 hours or after sweating, drying off with a towel, or swimming. Always wear a seat belt when traveling in a car. Always wear a helmet when riding a bicycle or motorcycle.

## 2023-02-16 LAB
25(OH)D3 SERPL-MCNC: 50.4 NG/ML (ref 30–100)
ALBUMIN SERPL-MCNC: 3.6 G/DL (ref 3.2–4.6)
ALBUMIN/GLOB SERPL: 1 (ref 0.4–1.6)
ALP SERPL-CCNC: 107 U/L (ref 50–136)
ALT SERPL-CCNC: 20 U/L (ref 12–65)
ANION GAP SERPL CALC-SCNC: 8 MMOL/L (ref 2–11)
AST SERPL-CCNC: 29 U/L (ref 15–37)
BILIRUB SERPL-MCNC: 1 MG/DL (ref 0.2–1.1)
BUN SERPL-MCNC: 31 MG/DL (ref 8–23)
CALCIUM SERPL-MCNC: 9.8 MG/DL (ref 8.3–10.4)
CHLORIDE SERPL-SCNC: 108 MMOL/L (ref 101–110)
CHOLEST SERPL-MCNC: 131 MG/DL
CO2 SERPL-SCNC: 26 MMOL/L (ref 21–32)
CREAT SERPL-MCNC: 1.4 MG/DL (ref 0.6–1)
GLOBULIN SER CALC-MCNC: 3.5 G/DL (ref 2.8–4.5)
GLUCOSE SERPL-MCNC: 122 MG/DL (ref 65–100)
HDLC SERPL-MCNC: 30 MG/DL (ref 40–60)
HDLC SERPL: 4.4
LDLC SERPL CALC-MCNC: 65.4 MG/DL
POTASSIUM SERPL-SCNC: 4.1 MMOL/L (ref 3.5–5.1)
PROT SERPL-MCNC: 7.1 G/DL (ref 6.3–8.2)
SODIUM SERPL-SCNC: 142 MMOL/L (ref 133–143)
TRIGL SERPL-MCNC: 178 MG/DL (ref 35–150)
TSH, 3RD GENERATION: 1.51 UIU/ML (ref 0.36–3.74)
VLDLC SERPL CALC-MCNC: 35.6 MG/DL (ref 6–23)

## 2023-03-15 ENCOUNTER — HOSPITAL ENCOUNTER (OUTPATIENT)
Dept: MAMMOGRAPHY | Age: 78
Discharge: HOME | End: 2023-03-18
Payer: MEDICARE

## 2023-03-15 DIAGNOSIS — Z12.31 SCREENING MAMMOGRAM FOR HIGH-RISK PATIENT: ICD-10-CM

## 2023-03-15 DIAGNOSIS — Z78.0 MENOPAUSE: ICD-10-CM

## 2023-03-15 PROCEDURE — 77067 SCR MAMMO BI INCL CAD: CPT

## 2023-03-15 PROCEDURE — 77080 DXA BONE DENSITY AXIAL: CPT

## 2023-03-29 ENCOUNTER — TELEMEDICINE (OUTPATIENT)
Dept: FAMILY MEDICINE CLINIC | Facility: CLINIC | Age: 78
End: 2023-03-29
Payer: MEDICARE

## 2023-03-29 DIAGNOSIS — I10 PRIMARY HYPERTENSION: ICD-10-CM

## 2023-03-29 DIAGNOSIS — E11.9 TYPE 2 DIABETES MELLITUS WITHOUT COMPLICATION, WITHOUT LONG-TERM CURRENT USE OF INSULIN (HCC): Primary | ICD-10-CM

## 2023-03-29 DIAGNOSIS — E78.1 PURE HYPERGLYCERIDEMIA: ICD-10-CM

## 2023-03-29 DIAGNOSIS — E78.00 PURE HYPERCHOLESTEROLEMIA: ICD-10-CM

## 2023-03-29 DIAGNOSIS — E55.9 VITAMIN D DEFICIENCY: ICD-10-CM

## 2023-03-29 DIAGNOSIS — F33.0 MAJOR DEPRESSIVE DISORDER, RECURRENT, MILD (HCC): ICD-10-CM

## 2023-03-29 PROBLEM — F33.9 MAJOR DEPRESSIVE DISORDER, RECURRENT, UNSPECIFIED (HCC): Status: ACTIVE | Noted: 2023-03-29

## 2023-03-29 PROBLEM — F33.1 MAJOR DEPRESSIVE DISORDER, RECURRENT, MODERATE (HCC): Status: ACTIVE | Noted: 2023-03-29

## 2023-03-29 PROCEDURE — 99442 PR PHYS/QHP TELEPHONE EVALUATION 11-20 MIN: CPT | Performed by: FAMILY MEDICINE

## 2023-03-29 ASSESSMENT — PATIENT HEALTH QUESTIONNAIRE - PHQ9
SUM OF ALL RESPONSES TO PHQ QUESTIONS 1-9: 0
SUM OF ALL RESPONSES TO PHQ QUESTIONS 1-9: 0
2. FEELING DOWN, DEPRESSED OR HOPELESS: 0
1. LITTLE INTEREST OR PLEASURE IN DOING THINGS: 0
SUM OF ALL RESPONSES TO PHQ QUESTIONS 1-9: 0
SUM OF ALL RESPONSES TO PHQ9 QUESTIONS 1 & 2: 0
SUM OF ALL RESPONSES TO PHQ QUESTIONS 1-9: 0

## 2023-03-29 NOTE — PROGRESS NOTES
encounter. Patient identification was verified at the start of the visit. She (or guardian if applicable) is aware that this is a billable service, which includes applicable co-pays. This visit was conducted with the patient's (and/or legal guardian's) verbal consent. She has not had a related appointment within my department in the past 7 days or scheduled within the next 24 hours. The patient was located at Home: [address] [address]. The provider was located at [address]): [address] [place] [address] [address] An[address],  [address]. Note: not billable if this call serves to triage the patient into an appointment for the relevant concern    On this date 3/29/2023 I have spent 15 minutes reviewing previous notes, test results and face to face with the patient discussing the diagnosis and importance of compliance with the treatment plan as well as documenting on the day of the visit. Greater than 50% of this visit was spent counseling the patient about test results, prognosis, importance of compliance, education about disease process, benefits of medications, instructions for management of acute symptoms, and follow up plans. --Deja Bush MD on 3/29/2023 at 12:49 PM    An electronic signature was used to authenticate this note.

## 2023-10-04 ENCOUNTER — HOSPITAL ENCOUNTER (EMERGENCY)
Age: 78
Discharge: HOME | End: 2023-10-04
Attending: EMERGENCY MEDICINE
Payer: MEDICARE

## 2023-10-04 ENCOUNTER — APPOINTMENT (OUTPATIENT)
Dept: GENERAL RADIOLOGY | Age: 78
End: 2023-10-04
Payer: MEDICARE

## 2023-10-04 VITALS
DIASTOLIC BLOOD PRESSURE: 64 MMHG | HEIGHT: 60 IN | SYSTOLIC BLOOD PRESSURE: 120 MMHG | HEART RATE: 68 BPM | WEIGHT: 198 LBS | OXYGEN SATURATION: 93 % | RESPIRATION RATE: 18 BRPM | TEMPERATURE: 98.1 F | BODY MASS INDEX: 38.87 KG/M2

## 2023-10-04 DIAGNOSIS — M54.31 SCIATICA OF RIGHT SIDE: Primary | ICD-10-CM

## 2023-10-04 PROCEDURE — 99284 EMERGENCY DEPT VISIT MOD MDM: CPT

## 2023-10-04 PROCEDURE — 96372 THER/PROPH/DIAG INJ SC/IM: CPT

## 2023-10-04 PROCEDURE — 6360000002 HC RX W HCPCS: Performed by: EMERGENCY MEDICINE

## 2023-10-04 PROCEDURE — 72100 X-RAY EXAM L-S SPINE 2/3 VWS: CPT

## 2023-10-04 RX ORDER — KETOROLAC TROMETHAMINE 30 MG/ML
30 INJECTION, SOLUTION INTRAMUSCULAR; INTRAVENOUS
Status: COMPLETED | OUTPATIENT
Start: 2023-10-04 | End: 2023-10-04

## 2023-10-04 RX ORDER — PREDNISONE 20 MG/1
60 TABLET ORAL DAILY
Qty: 12 TABLET | Refills: 0 | Status: SHIPPED | OUTPATIENT
Start: 2023-10-04 | End: 2023-10-08

## 2023-10-04 RX ORDER — LIDOCAINE 50 MG/G
1 PATCH TOPICAL DAILY
Qty: 10 PATCH | Refills: 0 | Status: SHIPPED | OUTPATIENT
Start: 2023-10-04 | End: 2023-10-14

## 2023-10-04 RX ADMIN — KETOROLAC TROMETHAMINE 30 MG: 30 INJECTION, SOLUTION INTRAMUSCULAR; INTRAVENOUS at 12:02

## 2023-10-04 ASSESSMENT — PAIN SCALES - GENERAL
PAINLEVEL_OUTOF10: 6
PAINLEVEL_OUTOF10: 6

## 2023-10-04 ASSESSMENT — PAIN DESCRIPTION - ORIENTATION
ORIENTATION: RIGHT
ORIENTATION: LOWER

## 2023-10-04 ASSESSMENT — ENCOUNTER SYMPTOMS
VOMITING: 0
ABDOMINAL PAIN: 0
BACK PAIN: 1
NAUSEA: 0
SHORTNESS OF BREATH: 0

## 2023-10-04 ASSESSMENT — LIFESTYLE VARIABLES
HOW OFTEN DO YOU HAVE A DRINK CONTAINING ALCOHOL: NEVER
HOW MANY STANDARD DRINKS CONTAINING ALCOHOL DO YOU HAVE ON A TYPICAL DAY: PATIENT DOES NOT DRINK

## 2023-10-04 ASSESSMENT — PAIN DESCRIPTION - LOCATION
LOCATION: BACK
LOCATION: HIP

## 2023-10-04 NOTE — ED TRIAGE NOTES
Reports progressive pain for months. Reports lower back pan that radiates tor right groin and down right thigh. Denies injury/trauma. Denies being seen for this in the past. Denies urinary s/s.

## 2023-10-04 NOTE — ED PROVIDER NOTES
has had the pain previously. She states the pain is better with Aleve and walking around and is worse when she sits or lays down for a long time. She states the pain is now radiating to her right groin and inner thigh. She has no numbness or weakness to her legs. She denies any change in bowel or bladder control. The history is provided by the patient. All other systems reviewed and are negative. Review of Systems   Constitutional:  Negative for chills and fever. Respiratory:  Negative for shortness of breath. Cardiovascular:  Negative for chest pain and leg swelling. Gastrointestinal:  Negative for abdominal pain, nausea and vomiting. No change in bowel control   Genitourinary:  Negative for dysuria, flank pain and hematuria. No change in bladder control   Musculoskeletal:  Positive for back pain. Negative for arthralgias, myalgias and neck pain. Neurological:  Negative for dizziness, weakness, numbness and headaches.        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  (Stereo Bx - negative per pt)     SECTION      CHOLECYSTECTOMY, LAPAROSCOPIC      COLONOSCOPY  2012    polyp removed, given 3-4 year recall    CYST REMOVAL      neck    HYSTERECTOMY (CERVIX STATUS UNKNOWN)      QUYNH AND BSO (CERVIX REMOVED)          Family History   Problem Relation Age of Onset    Breast Cancer Neg Hx     Hypertension Father     Heart Disease Father         Social History     Socioeconomic History    Marital status:    Tobacco Use    Smoking status: Never    Smokeless

## 2023-10-10 ENCOUNTER — OFFICE VISIT (OUTPATIENT)
Dept: ORTHOPEDIC SURGERY | Age: 78
End: 2023-10-10
Payer: MEDICARE

## 2023-10-10 VITALS — BODY MASS INDEX: 39.74 KG/M2 | WEIGHT: 202.4 LBS | HEIGHT: 60 IN

## 2023-10-10 DIAGNOSIS — M54.16 LUMBAR RADICULOPATHY, RIGHT: Primary | ICD-10-CM

## 2023-10-10 PROCEDURE — 99203 OFFICE O/P NEW LOW 30 MIN: CPT | Performed by: ORTHOPAEDIC SURGERY

## 2023-10-10 PROCEDURE — 1123F ACP DISCUSS/DSCN MKR DOCD: CPT | Performed by: ORTHOPAEDIC SURGERY

## 2023-10-10 RX ORDER — TIZANIDINE 2 MG/1
2 TABLET ORAL NIGHTLY PRN
Qty: 30 TABLET | Refills: 0 | Status: SHIPPED | OUTPATIENT
Start: 2023-10-10

## 2023-10-10 NOTE — PROGRESS NOTES
Name: Jeramie Rogers  YOB: 1945  Gender: female  MRN: [id number]  Age: 78 y.o. Chief Complaint: Low back pain rating with radiation to the right groin    History of Present Illness: This is a very pleasant 78 y.o. female who presents with a 2-month history of low back pain rating to the right groin and inner thigh. She denies any injury. She states the pain started on its own. She has not tried any injections. The pain is worse with standing, walking, stairs, laying down. She has tried anti-inflammatories with some improvement. She is also taken oral steroids with some improvement. She did get a Toradol shot in the ER which had mild improvement. States pretty much that her groin and leg pain is gone she just has this right-sided low back pain. Medications:       Current Outpatient Medications:     tiZANidine (ZANAFLEX) 2 MG tablet, Take 1 tablet by mouth nightly as needed (muscle spasms), Disp: 30 tablet, Rfl: 0    busPIRone (BUSPAR) 15 MG tablet, Take 15 mg by mouth 2 times daily, Disp: 180 tablet, Rfl: 3    chlorthalidone (HYGROTON) 25 MG tablet, TAKE 1 TABLET BY MOUTH ONCE DAILY FOR 30 DAYS, Disp: 90 tablet, Rfl: 3    citalopram (CELEXA) 20 MG tablet, Take 1 tablet by mouth daily, Disp: 90 tablet, Rfl: 3    fenofibrate (TRIGLIDE) 160 MG tablet, Take 1 tablet by mouth daily, Disp: 90 tablet, Rfl: 3    olmesartan (BENICAR) 40 MG tablet, Take 1 tablet by mouth daily, Disp: 90 tablet, Rfl: 3    atorvastatin (LIPITOR) 10 MG tablet, Take 1 tablet by mouth daily, Disp: 90 tablet, Rfl: 3    Cholecalciferol 50 MCG (2000 UT) TABS, Take by mouth, Disp: , Rfl:     lidocaine (LIDODERM) 5 %, Place 1 patch onto the skin daily for 10 days 12 hours on, 12 hours off. (Patient not taking: Reported on 10/10/2023), Disp: 10 patch, Rfl: 0    Allergies:    No Known Allergies      Physical Exam:     This is a well developed well nourished female adult in no acute distress.      Mood and

## 2023-11-03 ENCOUNTER — TELEPHONE (OUTPATIENT)
Age: 78
End: 2023-11-03

## 2023-11-03 NOTE — TELEPHONE ENCOUNTER
Unable to leave a vm to speak with patient about moving her therapy to outside POA due to construction in gym

## 2023-11-07 RX ORDER — CITALOPRAM 20 MG/1
20 TABLET ORAL DAILY
Qty: 90 TABLET | Refills: 0 | Status: SHIPPED | OUTPATIENT
Start: 2023-11-07

## 2023-11-07 RX ORDER — FENOFIBRATE 160 MG/1
160 TABLET ORAL DAILY
Qty: 90 TABLET | Refills: 0 | Status: SHIPPED | OUTPATIENT
Start: 2023-11-07

## 2023-11-07 RX ORDER — ATORVASTATIN CALCIUM 10 MG/1
10 TABLET, FILM COATED ORAL DAILY
Qty: 90 TABLET | Refills: 0 | Status: SHIPPED | OUTPATIENT
Start: 2023-11-07

## 2023-12-11 RX ORDER — OLMESARTAN MEDOXOMIL 40 MG/1
TABLET ORAL
Qty: 90 TABLET | Refills: 0 | Status: SHIPPED | OUTPATIENT
Start: 2023-12-11

## 2024-02-06 RX ORDER — FENOFIBRATE 160 MG/1
160 TABLET ORAL DAILY
Qty: 90 TABLET | Refills: 0 | Status: SHIPPED | OUTPATIENT
Start: 2024-02-06

## 2024-02-06 RX ORDER — CITALOPRAM 20 MG/1
20 TABLET ORAL DAILY
Qty: 90 TABLET | Refills: 0 | Status: SHIPPED | OUTPATIENT
Start: 2024-02-06

## 2024-02-06 RX ORDER — ATORVASTATIN CALCIUM 10 MG/1
10 TABLET, FILM COATED ORAL DAILY
Qty: 90 TABLET | Refills: 0 | Status: SHIPPED | OUTPATIENT
Start: 2024-02-06

## 2024-02-28 ENCOUNTER — TRANSCRIBE ORDERS (OUTPATIENT)
Dept: SCHEDULING | Age: 79
End: 2024-02-28

## 2024-02-28 DIAGNOSIS — Z12.31 SCREENING MAMMOGRAM FOR HIGH-RISK PATIENT: Primary | ICD-10-CM

## 2024-02-29 ENCOUNTER — OFFICE VISIT (OUTPATIENT)
Dept: FAMILY MEDICINE CLINIC | Facility: CLINIC | Age: 79
End: 2024-02-29

## 2024-02-29 VITALS
HEIGHT: 60 IN | SYSTOLIC BLOOD PRESSURE: 112 MMHG | BODY MASS INDEX: 38.79 KG/M2 | WEIGHT: 197.6 LBS | DIASTOLIC BLOOD PRESSURE: 64 MMHG

## 2024-02-29 DIAGNOSIS — M25.562 ACUTE PAIN OF LEFT KNEE: ICD-10-CM

## 2024-02-29 DIAGNOSIS — E78.1 PURE HYPERGLYCERIDEMIA: ICD-10-CM

## 2024-02-29 DIAGNOSIS — E66.01 SEVERE OBESITY (BMI 35.0-39.9) WITH COMORBIDITY (HCC): ICD-10-CM

## 2024-02-29 DIAGNOSIS — I10 PRIMARY HYPERTENSION: ICD-10-CM

## 2024-02-29 DIAGNOSIS — E78.00 PURE HYPERCHOLESTEROLEMIA: ICD-10-CM

## 2024-02-29 DIAGNOSIS — E11.9 TYPE 2 DIABETES MELLITUS WITHOUT COMPLICATION, WITHOUT LONG-TERM CURRENT USE OF INSULIN (HCC): ICD-10-CM

## 2024-02-29 DIAGNOSIS — R05.1 ACUTE COUGH: ICD-10-CM

## 2024-02-29 DIAGNOSIS — J06.9 VIRAL URI: ICD-10-CM

## 2024-02-29 DIAGNOSIS — F33.0 MAJOR DEPRESSIVE DISORDER, RECURRENT, MILD (HCC): ICD-10-CM

## 2024-02-29 DIAGNOSIS — F41.9 ANXIETY: ICD-10-CM

## 2024-02-29 DIAGNOSIS — E55.9 VITAMIN D DEFICIENCY: ICD-10-CM

## 2024-02-29 DIAGNOSIS — Z00.00 MEDICARE ANNUAL WELLNESS VISIT, SUBSEQUENT: Primary | ICD-10-CM

## 2024-02-29 PROBLEM — F33.1 MAJOR DEPRESSIVE DISORDER, RECURRENT, MODERATE (HCC): Status: RESOLVED | Noted: 2023-03-29 | Resolved: 2024-02-29

## 2024-02-29 LAB
25(OH)D3 SERPL-MCNC: 45.6 NG/ML (ref 30–100)
ALBUMIN SERPL-MCNC: 3.5 G/DL (ref 3.2–4.6)
ALBUMIN/GLOB SERPL: 1.1 (ref 0.4–1.6)
ALP SERPL-CCNC: 117 U/L (ref 50–136)
ALT SERPL-CCNC: 23 U/L (ref 12–65)
ANION GAP SERPL CALC-SCNC: 7 MMOL/L (ref 2–11)
AST SERPL-CCNC: 34 U/L (ref 15–37)
BASOPHILS # BLD: 0.1 K/UL (ref 0–0.2)
BASOPHILS NFR BLD: 1 % (ref 0–2)
BILIRUB SERPL-MCNC: 0.7 MG/DL (ref 0.2–1.1)
BUN SERPL-MCNC: 37 MG/DL (ref 8–23)
CALCIUM SERPL-MCNC: 9.8 MG/DL (ref 8.3–10.4)
CHLORIDE SERPL-SCNC: 109 MMOL/L (ref 103–113)
CHOLEST SERPL-MCNC: 119 MG/DL
CO2 SERPL-SCNC: 29 MMOL/L (ref 21–32)
CREAT SERPL-MCNC: 2 MG/DL (ref 0.6–1)
DIFFERENTIAL METHOD BLD: NORMAL
EOSINOPHIL # BLD: 0.2 K/UL (ref 0–0.8)
EOSINOPHIL NFR BLD: 3 % (ref 0.5–7.8)
ERYTHROCYTE [DISTWIDTH] IN BLOOD BY AUTOMATED COUNT: 14.4 % (ref 11.9–14.6)
EST. AVERAGE GLUCOSE BLD GHB EST-MCNC: 105 MG/DL
GLOBULIN SER CALC-MCNC: 3.2 G/DL (ref 2.8–4.5)
GLUCOSE SERPL-MCNC: 126 MG/DL (ref 65–100)
HBA1C MFR BLD: 5.3 % (ref 4.8–5.6)
HCT VFR BLD AUTO: 42.2 % (ref 35.8–46.3)
HDLC SERPL-MCNC: 25 MG/DL (ref 40–60)
HDLC SERPL: 4.8
HGB BLD-MCNC: 13.6 G/DL (ref 11.7–15.4)
IMM GRANULOCYTES # BLD AUTO: 0 K/UL (ref 0–0.5)
IMM GRANULOCYTES NFR BLD AUTO: 0 % (ref 0–5)
LDLC SERPL CALC-MCNC: 59 MG/DL
LYMPHOCYTES # BLD: 3.5 K/UL (ref 0.5–4.6)
LYMPHOCYTES NFR BLD: 40 % (ref 13–44)
MCH RBC QN AUTO: 31.8 PG (ref 26.1–32.9)
MCHC RBC AUTO-ENTMCNC: 32.2 G/DL (ref 31.4–35)
MCV RBC AUTO: 98.6 FL (ref 82–102)
MICROALB/CREAT RATIO, POC: ABNORMAL
MICROALBUMIN URINE, POC: 20 MG/L
MONOCYTES # BLD: 1 K/UL (ref 0.1–1.3)
MONOCYTES NFR BLD: 11 % (ref 4–12)
NEUTS SEG # BLD: 4 K/UL (ref 1.7–8.2)
NEUTS SEG NFR BLD: 45 % (ref 43–78)
NRBC # BLD: 0 K/UL (ref 0–0.2)
PLATELET # BLD AUTO: 325 K/UL (ref 150–450)
PMV BLD AUTO: 10 FL (ref 9.4–12.3)
POTASSIUM SERPL-SCNC: 4.3 MMOL/L (ref 3.5–5.1)
PROT SERPL-MCNC: 6.7 G/DL (ref 6.3–8.2)
RBC # BLD AUTO: 4.28 M/UL (ref 4.05–5.2)
SODIUM SERPL-SCNC: 145 MMOL/L (ref 136–146)
TRIGL SERPL-MCNC: 175 MG/DL (ref 35–150)
TSH, 3RD GENERATION: 1.08 UIU/ML (ref 0.36–3.74)
VLDLC SERPL CALC-MCNC: 35 MG/DL (ref 6–23)
WBC # BLD AUTO: 8.8 K/UL (ref 4.3–11.1)

## 2024-02-29 RX ORDER — OLMESARTAN MEDOXOMIL 40 MG/1
TABLET ORAL
Qty: 90 TABLET | Refills: 3 | Status: SHIPPED | OUTPATIENT
Start: 2024-02-29

## 2024-02-29 RX ORDER — CHLORTHALIDONE 25 MG/1
TABLET ORAL
Qty: 90 TABLET | Refills: 3 | Status: SHIPPED | OUTPATIENT
Start: 2024-02-29

## 2024-02-29 RX ORDER — BUSPIRONE HYDROCHLORIDE 15 MG/1
15 TABLET ORAL 2 TIMES DAILY
Qty: 180 TABLET | Refills: 3 | Status: SHIPPED | OUTPATIENT
Start: 2024-02-29

## 2024-02-29 RX ORDER — ATORVASTATIN CALCIUM 10 MG/1
10 TABLET, FILM COATED ORAL DAILY
Qty: 90 TABLET | Refills: 3 | Status: SHIPPED | OUTPATIENT
Start: 2024-02-29

## 2024-02-29 RX ORDER — FENOFIBRATE 160 MG/1
160 TABLET ORAL DAILY
Qty: 90 TABLET | Refills: 3 | Status: SHIPPED | OUTPATIENT
Start: 2024-02-29

## 2024-02-29 RX ORDER — CITALOPRAM 20 MG/1
20 TABLET ORAL DAILY
Qty: 90 TABLET | Refills: 3 | Status: SHIPPED | OUTPATIENT
Start: 2024-02-29

## 2024-02-29 SDOH — ECONOMIC STABILITY: FOOD INSECURITY: WITHIN THE PAST 12 MONTHS, YOU WORRIED THAT YOUR FOOD WOULD RUN OUT BEFORE YOU GOT MONEY TO BUY MORE.: NEVER TRUE

## 2024-02-29 SDOH — ECONOMIC STABILITY: INCOME INSECURITY: HOW HARD IS IT FOR YOU TO PAY FOR THE VERY BASICS LIKE FOOD, HOUSING, MEDICAL CARE, AND HEATING?: NOT HARD AT ALL

## 2024-02-29 SDOH — ECONOMIC STABILITY: FOOD INSECURITY: WITHIN THE PAST 12 MONTHS, THE FOOD YOU BOUGHT JUST DIDN'T LAST AND YOU DIDN'T HAVE MONEY TO GET MORE.: NEVER TRUE

## 2024-02-29 ASSESSMENT — PATIENT HEALTH QUESTIONNAIRE - PHQ9
SUM OF ALL RESPONSES TO PHQ QUESTIONS 1-9: 0
1. LITTLE INTEREST OR PLEASURE IN DOING THINGS: 0
SUM OF ALL RESPONSES TO PHQ9 QUESTIONS 1 & 2: 0
SUM OF ALL RESPONSES TO PHQ QUESTIONS 1-9: 0
2. FEELING DOWN, DEPRESSED OR HOPELESS: 0
SUM OF ALL RESPONSES TO PHQ QUESTIONS 1-9: 0
SUM OF ALL RESPONSES TO PHQ QUESTIONS 1-9: 0

## 2024-02-29 NOTE — PATIENT INSTRUCTIONS
the bathroom with you.   Where can you learn more?  Go to [email or web address] and enter G117 to learn more about \"Preventing Falls: Care Instructions.\"  Current as of: July 18, 2023               Content Version: 13.9  © 0910-2095 [employer].   Care instructions adapted under license by [employer]. If you have questions about a medical condition or this instruction, always ask your healthcare professional. [employer] disclaims any warranty or liability for your use of this information.           Learning About Stress  What is stress?     Stress is your body's response to a hard situation. Your body can have a physical, emotional, or mental response. Stress is a fact of life for most people, and it affects everyone differently. What causes stress for you may not be stressful for someone else.  A lot of things can cause stress. You may feel stress when you go on a job interview, take a test, or run a race. This kind of short-term stress is normal and even useful. It can help you if you need to work hard or react quickly. For example, stress can help you finish an important job on time.  Long-term stress is caused by ongoing stressful situations or events. Examples of long-term stress include long-term health problems, ongoing problems at work, or conflicts in your family. Long-term stress can harm your health.  How does stress affect your health?  When you are stressed, your body responds as though you are in danger. It makes hormones that speed up your heart, make you breathe faster, and give you a burst of energy. This is called the fight-or-flight stress response. If the stress is over quickly, your body goes back to normal and no harm is done.  But if stress happens too often or lasts too long, it can have bad effects. Long-term stress can make you more likely to get sick, and it can make symptoms of some diseases worse. If you tense up when you are stressed, you may

## 2024-02-29 NOTE — PROGRESS NOTES
No  Interventions:  Patient declined any further interventions or treatment     Advanced Directives:  Do you have a Living Will?: (!) No    Intervention:  has NO advanced directive - information provided                     Objective   Vitals:    02/29/24 1033   BP: 112/64   Weight: 89.6 kg (197 lb 9.6 oz)   Height: 1.511 m (4' 11.5\")      Body mass index is 39.24 kg/m².        General Appearance: alert and oriented to person, place and time, well developed and well- nourished, in no acute distress  Skin: warm and dry, no rash or erythema  Head: normocephalic and atraumatic  Eyes: pupils equal, round, and reactive to light, extraocular eye movements intact, conjunctivae normal  ENT: tympanic membrane, external ear and ear canal normal bilaterally, nose without deformity, nasal mucosa and turbinates normal without polyps  Neck: supple and non-tender without mass, no thyromegaly or thyroid nodules, no cervical lymphadenopathy  Pulmonary/Chest: clear to auscultation bilaterally- no wheezes, rales or rhonchi, normal air movement, no respiratory distress  Cardiovascular: normal rate, regular rhythm, normal S1 and S2, no murmurs, rubs, clicks, or gallops, distal pulses intact, no carotid bruits  Abdomen: soft, non-tender, non-distended, normal bowel sounds, no masses or organomegaly  Extremities: no cyanosis, clubbing or edema  Musculoskeletal: normal range of motion, no joint swelling, deformity or tenderness  Neurologic: reflexes normal and symmetric, no cranial nerve deficit, gait, coordination and speech normal       No Known Allergies  Prior to Visit Medications    Medication Sig Taking? Authorizing Provider   citalopram (CELEXA) 20 MG tablet Take 1 tablet by mouth once daily Yes Davis Salas MD   atorvastatin (LIPITOR) 10 MG tablet Take 1 tablet by mouth once daily Yes Davis Salas MD   fenofibrate (TRIGLIDE) 160 MG tablet Take 1 tablet by mouth once daily Yes Davis Salas MD   olmesartan (BENICAR) 40 
normal.         Behavior: Behavior normal.         Thought Content: Thought content normal.         Judgment: Judgment normal.         Medical problems and test results were reviewed with the patient today.         ASSESSMENT and PLAN    1.  Cough.  Underlying URI.  Treat with over-the-counter Delsym and plain Mucinex as needed.  If symptoms progress or worsen she is to let me know.    2.  Chest congestion.  As above.  No evidence of acute bronchitis.    3.  Left knee pain.  Appears to be contusion.  Improving subjectively and physical exam is unremarkable.  Treat with ice and rest.  If worsens or persist she is to let me know.    4.  Hypertension.  BP is 112/64.  Check metabolic panel, TSH and CBC.    5.  High cholesterol.  Check lipid panel.    6.  High triglycerides.  Check lipid panel.    7.  Diabetes.  Check A1c and microalbumin.  Encourage yearly retinal exams and daily feet exams.  Flu vaccine and Prevnar 20 are up-to-date.  Suggested RSV vaccine.    8.  Vitamin D deficiency.  Check vitamin D level.    9.  Recurrent depression.  No breakthrough complaints reported.    10.  Anxiety.  As above.    11.  Obesity.  BMI 39.2.  Dietary counseling provided.    Elements of this note have been dictated using speech recognition software. As a result, errors of speech recognition may have occurred.

## 2024-04-02 ENCOUNTER — OFFICE VISIT (OUTPATIENT)
Dept: FAMILY MEDICINE CLINIC | Facility: CLINIC | Age: 79
End: 2024-04-02
Payer: MEDICARE

## 2024-04-02 VITALS
HEIGHT: 60 IN | WEIGHT: 196.4 LBS | SYSTOLIC BLOOD PRESSURE: 100 MMHG | HEART RATE: 64 BPM | OXYGEN SATURATION: 96 % | DIASTOLIC BLOOD PRESSURE: 54 MMHG | BODY MASS INDEX: 38.56 KG/M2

## 2024-04-02 DIAGNOSIS — R82.998 DARK BROWN-COLORED URINE: ICD-10-CM

## 2024-04-02 DIAGNOSIS — E78.00 PURE HYPERCHOLESTEROLEMIA: ICD-10-CM

## 2024-04-02 DIAGNOSIS — R42 DIZZY: ICD-10-CM

## 2024-04-02 DIAGNOSIS — E11.9 TYPE 2 DIABETES MELLITUS WITHOUT COMPLICATION, WITHOUT LONG-TERM CURRENT USE OF INSULIN (HCC): ICD-10-CM

## 2024-04-02 DIAGNOSIS — R53.1 WEAKNESS: Primary | ICD-10-CM

## 2024-04-02 DIAGNOSIS — F33.9 RECURRENT DEPRESSION (HCC): ICD-10-CM

## 2024-04-02 DIAGNOSIS — N18.30 STAGE 3 CHRONIC KIDNEY DISEASE, UNSPECIFIED WHETHER STAGE 3A OR 3B CKD (HCC): ICD-10-CM

## 2024-04-02 DIAGNOSIS — I10 PRIMARY HYPERTENSION: ICD-10-CM

## 2024-04-02 DIAGNOSIS — E78.1 PURE HYPERGLYCERIDEMIA: ICD-10-CM

## 2024-04-02 PROCEDURE — G8399 PT W/DXA RESULTS DOCUMENT: HCPCS | Performed by: FAMILY MEDICINE

## 2024-04-02 PROCEDURE — 1090F PRES/ABSN URINE INCON ASSESS: CPT | Performed by: FAMILY MEDICINE

## 2024-04-02 PROCEDURE — 3044F HG A1C LEVEL LT 7.0%: CPT | Performed by: FAMILY MEDICINE

## 2024-04-02 PROCEDURE — G8417 CALC BMI ABV UP PARAM F/U: HCPCS | Performed by: FAMILY MEDICINE

## 2024-04-02 PROCEDURE — 1036F TOBACCO NON-USER: CPT | Performed by: FAMILY MEDICINE

## 2024-04-02 PROCEDURE — 99214 OFFICE O/P EST MOD 30 MIN: CPT | Performed by: FAMILY MEDICINE

## 2024-04-02 PROCEDURE — G8428 CUR MEDS NOT DOCUMENT: HCPCS | Performed by: FAMILY MEDICINE

## 2024-04-02 PROCEDURE — 3074F SYST BP LT 130 MM HG: CPT | Performed by: FAMILY MEDICINE

## 2024-04-02 PROCEDURE — 81003 URINALYSIS AUTO W/O SCOPE: CPT | Performed by: FAMILY MEDICINE

## 2024-04-02 PROCEDURE — 1123F ACP DISCUSS/DSCN MKR DOCD: CPT | Performed by: FAMILY MEDICINE

## 2024-04-02 PROCEDURE — 3078F DIAST BP <80 MM HG: CPT | Performed by: FAMILY MEDICINE

## 2024-04-02 RX ORDER — OLMESARTAN MEDOXOMIL 20 MG/1
20 TABLET ORAL DAILY
Qty: 30 TABLET | Refills: 3
Start: 2024-04-02

## 2024-04-02 NOTE — PROGRESS NOTES
SUBJECTIVE:   Jenn Gomez is a 78 y.o. female who has a past medical history significant for hypertension, diabetes, high cholesterol, high triglycerides, vitamin D deficiency, anxiety, allergic rhinitis and recurrent depression.  Patient presents today reporting that she has been experiencing some weakness and dizziness particularly with changes in position.  The symptoms been going on for 2 or 3 months.  They seem to be becoming more prominent.  She reports that she did fall about a week ago and landed on her right side.  She states that she has been a little sore in her lower back but otherwise is ambulating well.  No loss of consciousness is reported.  No chest pain or shortness of breath.    In addition the patient reports that she has noticed that her urine has been darker in color over the last several days.  She is not sure if it is because of dehydration or other causes.  She reports no urinary frequency or urgency.  No dysuria.  Current medications are listed in the EMR and reviewed today. Current medications are listed in the EMR and reviewed today    HPI  See above    Past Medical History, Past Surgical History, Family history, Social History, and Medications were all reviewed with the patient today and updated as necessary.       Current Outpatient Medications   Medication Sig Dispense Refill    busPIRone (BUSPAR) 15 MG tablet Take 15 mg by mouth 2 times daily 180 tablet 3    chlorthalidone (HYGROTON) 25 MG tablet TAKE 1 TABLET BY MOUTH ONCE DAILY FOR 30 DAYS 90 tablet 3    olmesartan (BENICAR) 40 MG tablet Take 1 tablet by mouth once a day 90 tablet 3    fenofibrate (TRIGLIDE) 160 MG tablet Take 1 tablet by mouth daily 90 tablet 3    citalopram (CELEXA) 20 MG tablet Take 1 tablet by mouth daily 90 tablet 3    atorvastatin (LIPITOR) 10 MG tablet Take 1 tablet by mouth daily 90 tablet 3    tiZANidine (ZANAFLEX) 2 MG tablet Take 1 tablet by mouth nightly as needed (muscle spasms) (Patient not taking:

## 2024-04-03 LAB
BILIRUBIN, URINE, POC: NEGATIVE
BLOOD URINE, POC: NEGATIVE
GLUCOSE URINE, POC: NEGATIVE
KETONES, URINE, POC: NEGATIVE
LEUKOCYTE ESTERASE, URINE, POC: ABNORMAL
NITRITE, URINE, POC: NEGATIVE
PH, URINE, POC: 5.5 (ref 4.6–8)
PROTEIN,URINE, POC: NEGATIVE
SPECIFIC GRAVITY, URINE, POC: 1.03 (ref 1–1.03)
URINALYSIS CLARITY, POC: ABNORMAL
URINALYSIS COLOR, POC: YELLOW
UROBILINOGEN, POC: ABNORMAL

## 2024-04-03 RX ORDER — SULFAMETHOXAZOLE AND TRIMETHOPRIM 800; 160 MG/1; MG/1
1 TABLET ORAL 2 TIMES DAILY
Qty: 14 TABLET | Refills: 0 | Status: SHIPPED | OUTPATIENT
Start: 2024-04-03 | End: 2024-04-10

## 2024-04-10 ENCOUNTER — HOSPITAL ENCOUNTER (OUTPATIENT)
Dept: MAMMOGRAPHY | Age: 79
Discharge: HOME | End: 2024-04-13
Attending: FAMILY MEDICINE
Payer: MEDICARE

## 2024-04-10 DIAGNOSIS — Z12.31 SCREENING MAMMOGRAM FOR HIGH-RISK PATIENT: ICD-10-CM

## 2024-04-10 PROCEDURE — 77067 SCR MAMMO BI INCL CAD: CPT

## 2024-04-15 ENCOUNTER — OFFICE VISIT (OUTPATIENT)
Dept: FAMILY MEDICINE CLINIC | Facility: CLINIC | Age: 79
End: 2024-04-15
Payer: MEDICARE

## 2024-04-15 VITALS
DIASTOLIC BLOOD PRESSURE: 60 MMHG | SYSTOLIC BLOOD PRESSURE: 100 MMHG | HEIGHT: 60 IN | BODY MASS INDEX: 38.64 KG/M2 | WEIGHT: 196.8 LBS

## 2024-04-15 DIAGNOSIS — R53.1 WEAKNESS: ICD-10-CM

## 2024-04-15 DIAGNOSIS — K62.5 RECTAL BLEEDING: Primary | ICD-10-CM

## 2024-04-15 DIAGNOSIS — E11.9 TYPE 2 DIABETES MELLITUS WITHOUT COMPLICATION, WITHOUT LONG-TERM CURRENT USE OF INSULIN (HCC): ICD-10-CM

## 2024-04-15 DIAGNOSIS — I10 PRIMARY HYPERTENSION: ICD-10-CM

## 2024-04-15 DIAGNOSIS — R35.0 FREQUENCY OF MICTURITION: ICD-10-CM

## 2024-04-15 PROCEDURE — 3044F HG A1C LEVEL LT 7.0%: CPT | Performed by: FAMILY MEDICINE

## 2024-04-15 PROCEDURE — 3078F DIAST BP <80 MM HG: CPT | Performed by: FAMILY MEDICINE

## 2024-04-15 PROCEDURE — 1036F TOBACCO NON-USER: CPT | Performed by: FAMILY MEDICINE

## 2024-04-15 PROCEDURE — G8427 DOCREV CUR MEDS BY ELIG CLIN: HCPCS | Performed by: FAMILY MEDICINE

## 2024-04-15 PROCEDURE — 99214 OFFICE O/P EST MOD 30 MIN: CPT | Performed by: FAMILY MEDICINE

## 2024-04-15 PROCEDURE — G8399 PT W/DXA RESULTS DOCUMENT: HCPCS | Performed by: FAMILY MEDICINE

## 2024-04-15 PROCEDURE — 1123F ACP DISCUSS/DSCN MKR DOCD: CPT | Performed by: FAMILY MEDICINE

## 2024-04-15 PROCEDURE — G8417 CALC BMI ABV UP PARAM F/U: HCPCS | Performed by: FAMILY MEDICINE

## 2024-04-15 PROCEDURE — 3074F SYST BP LT 130 MM HG: CPT | Performed by: FAMILY MEDICINE

## 2024-04-15 PROCEDURE — 1090F PRES/ABSN URINE INCON ASSESS: CPT | Performed by: FAMILY MEDICINE

## 2024-04-15 ASSESSMENT — PATIENT HEALTH QUESTIONNAIRE - PHQ9
SUM OF ALL RESPONSES TO PHQ QUESTIONS 1-9: 0
1. LITTLE INTEREST OR PLEASURE IN DOING THINGS: NOT AT ALL
SUM OF ALL RESPONSES TO PHQ9 QUESTIONS 1 & 2: 0
SUM OF ALL RESPONSES TO PHQ QUESTIONS 1-9: 0
2. FEELING DOWN, DEPRESSED OR HOPELESS: NOT AT ALL

## 2024-04-15 NOTE — PROGRESS NOTES
SUBJECTIVE:   Jenn Gomez is a 78 y.o. female who has a past medical history significant for hypertension, diabetes, high cholesterol, high triglycerides, vitamin D deficiency, anxiety, allergic rhinitis and recurrent depression.  At previous visit the patient reported some generalized weakness.  Please refer to prior notes for details.  Blood pressure was noted to be low.  As a consequence her chlorthalidone was discontinued and her Benicar was decreased to 20 mg.  She does feel better and states that her energy has improved.  She also was complaining of some urinary urgency and frequency.  A urinalysis was abnormal.  She was treated with Septra DS and reports resolution of her symptoms.    Patient presents today reporting that for a week she has been noticing some blood in her stool and with her bowel movements.  She states that she has had what she describes as a moderate amount of blood with each bowel movement and on the tissue when she wipes.  She reports no constipation.  She reports no abdominal pain.  She reports no rectal pain with bowel movements.  She reports no use of aspirin or anticoagulants.  She has had rectal bleeding in the past but never \"this bad\".  Patient had a colonoscopy May 2023 revealing a tubular adenoma and internal and external hemorrhoids.    HPI  See above    Past Medical History, Past Surgical History, Family history, Social History, and Medications were all reviewed with the patient today and updated as necessary.       Current Outpatient Medications   Medication Sig Dispense Refill    olmesartan (BENICAR) 20 MG tablet Take 1 tablet by mouth daily 30 tablet 3    busPIRone (BUSPAR) 15 MG tablet Take 15 mg by mouth 2 times daily 180 tablet 3    fenofibrate (TRIGLIDE) 160 MG tablet Take 1 tablet by mouth daily 90 tablet 3    citalopram (CELEXA) 20 MG tablet Take 1 tablet by mouth daily 90 tablet 3    atorvastatin (LIPITOR) 10 MG tablet Take 1 tablet by mouth daily 90 tablet 3

## 2024-04-16 ENCOUNTER — NURSE ONLY (OUTPATIENT)
Dept: FAMILY MEDICINE CLINIC | Facility: CLINIC | Age: 79
End: 2024-04-16
Payer: MEDICARE

## 2024-04-16 DIAGNOSIS — K62.5 RECTAL BLEEDING: ICD-10-CM

## 2024-04-16 LAB
BASOPHILS # BLD: 0.1 K/UL (ref 0–0.2)
BASOPHILS NFR BLD: 1 % (ref 0–2)
DIFFERENTIAL METHOD BLD: ABNORMAL
EOSINOPHIL # BLD: 0.1 K/UL (ref 0–0.8)
EOSINOPHIL NFR BLD: 2 % (ref 0.5–7.8)
ERYTHROCYTE [DISTWIDTH] IN BLOOD BY AUTOMATED COUNT: 13.5 % (ref 11.9–14.6)
HCT VFR BLD AUTO: 38.9 % (ref 35.8–46.3)
HGB BLD-MCNC: 12.7 G/DL (ref 11.7–15.4)
IMM GRANULOCYTES # BLD AUTO: 0 K/UL (ref 0–0.5)
IMM GRANULOCYTES NFR BLD AUTO: 0 % (ref 0–5)
LYMPHOCYTES # BLD: 2.6 K/UL (ref 0.5–4.6)
LYMPHOCYTES NFR BLD: 42 % (ref 13–44)
MCH RBC QN AUTO: 32.3 PG (ref 26.1–32.9)
MCHC RBC AUTO-ENTMCNC: 32.6 G/DL (ref 31.4–35)
MCV RBC AUTO: 99 FL (ref 82–102)
MONOCYTES # BLD: 0.5 K/UL (ref 0.1–1.3)
MONOCYTES NFR BLD: 8 % (ref 4–12)
NEUTS SEG # BLD: 2.9 K/UL (ref 1.7–8.2)
NEUTS SEG NFR BLD: 47 % (ref 43–78)
NRBC # BLD: 0 K/UL (ref 0–0.2)
PLATELET # BLD AUTO: 280 K/UL (ref 150–450)
PMV BLD AUTO: 9.4 FL (ref 9.4–12.3)
RBC # BLD AUTO: 3.93 M/UL (ref 4.05–5.2)
WBC # BLD AUTO: 6.3 K/UL (ref 4.3–11.1)

## 2024-04-16 PROCEDURE — 36415 COLL VENOUS BLD VENIPUNCTURE: CPT | Performed by: FAMILY MEDICINE

## 2024-05-14 ENCOUNTER — NURSE ONLY (OUTPATIENT)
Dept: FAMILY MEDICINE CLINIC | Facility: CLINIC | Age: 79
End: 2024-05-14

## 2024-05-14 DIAGNOSIS — N18.30 STAGE 3 CHRONIC KIDNEY DISEASE, UNSPECIFIED WHETHER STAGE 3A OR 3B CKD (HCC): ICD-10-CM

## 2024-05-14 DIAGNOSIS — I10 PRIMARY HYPERTENSION: ICD-10-CM

## 2024-05-14 LAB
ALBUMIN SERPL-MCNC: 3.1 G/DL (ref 3.2–4.6)
ALBUMIN/GLOB SERPL: 1.1 (ref 1–1.9)
ALP SERPL-CCNC: 79 U/L (ref 35–104)
ALT SERPL-CCNC: 12 U/L (ref 12–65)
ANION GAP SERPL CALC-SCNC: 9 MMOL/L (ref 9–18)
AST SERPL-CCNC: 35 U/L (ref 15–37)
BILIRUB SERPL-MCNC: 0.9 MG/DL (ref 0–1.2)
BUN SERPL-MCNC: 20 MG/DL (ref 8–23)
CALCIUM SERPL-MCNC: 9.2 MG/DL (ref 8.8–10.2)
CHLORIDE SERPL-SCNC: 106 MMOL/L (ref 98–107)
CO2 SERPL-SCNC: 27 MMOL/L (ref 20–28)
CREAT SERPL-MCNC: 1.19 MG/DL (ref 0.6–1.1)
GLOBULIN SER CALC-MCNC: 2.7 G/DL (ref 2.3–3.5)
GLUCOSE SERPL-MCNC: 110 MG/DL (ref 70–99)
POTASSIUM SERPL-SCNC: 3.6 MMOL/L (ref 3.5–5.1)
PROT SERPL-MCNC: 5.8 G/DL (ref 6.3–8.2)
SODIUM SERPL-SCNC: 142 MMOL/L (ref 136–145)

## 2024-05-22 ENCOUNTER — OFFICE VISIT (OUTPATIENT)
Dept: FAMILY MEDICINE CLINIC | Facility: CLINIC | Age: 79
End: 2024-05-22

## 2024-05-22 VITALS
WEIGHT: 201 LBS | SYSTOLIC BLOOD PRESSURE: 124 MMHG | OXYGEN SATURATION: 97 % | DIASTOLIC BLOOD PRESSURE: 72 MMHG | HEIGHT: 60 IN | HEART RATE: 64 BPM | BODY MASS INDEX: 39.46 KG/M2

## 2024-05-22 DIAGNOSIS — E11.9 TYPE 2 DIABETES MELLITUS WITHOUT COMPLICATION, WITHOUT LONG-TERM CURRENT USE OF INSULIN (HCC): ICD-10-CM

## 2024-05-22 DIAGNOSIS — I10 PRIMARY HYPERTENSION: Primary | ICD-10-CM

## 2024-05-22 DIAGNOSIS — E78.1 PURE HYPERGLYCERIDEMIA: ICD-10-CM

## 2024-05-22 DIAGNOSIS — N18.2 STAGE 2 CHRONIC KIDNEY DISEASE: ICD-10-CM

## 2024-05-22 DIAGNOSIS — F33.9 RECURRENT DEPRESSION (HCC): ICD-10-CM

## 2024-05-22 DIAGNOSIS — E78.00 PURE HYPERCHOLESTEROLEMIA: ICD-10-CM

## 2024-05-22 DIAGNOSIS — K62.5 RECTAL BLEEDING: ICD-10-CM

## 2024-05-22 LAB
GRANS ABSOLUTE, POC: 2.9 K/UL
GRANULOCYTES %, POC: 51.4 %
HEMATOCRIT, POC: 40.8 %
HEMOGLOBIN, POC: 13.1 G/DL
LYMPHOCYTE %, POC: 38.6 %
LYMPHS ABSOLUTE, POC: 2.2 K/UL
MCH, POC: 33.9 PG (ref 40–?)
MCHC, POC: 32.1
MCV, POC: 105.5
MONOCYTE %, POC: 10 %
MONOCYTE, ABSOLUTE POC: 0.6 K/UL
MPV, POC: 7 FL
PLATELET COUNT, POC: 334 K/UL
RBC, POC: 3.87 M/UL
RDW, POC: 12 %
WBC, POC: 5.7 K/UL

## 2024-05-22 RX ORDER — HYDROCORTISONE ACETATE 25 MG/1
SUPPOSITORY RECTAL
COMMUNITY
Start: 2024-04-17

## 2024-05-22 NOTE — PROGRESS NOTES
Pure hypercholesterolemia    Pure hyperglyceridemia    Snoring    Status post total knee replacement, right    Osteoarthritis of right knee    Type 2 diabetes mellitus without complication (HCC)    Depression    Osteoarthritis    Obesity, morbid (HCC)    Essential hypertension with goal blood pressure less than 130/85    Major depressive disorder, recurrent, mild    Major depressive disorder, recurrent, unspecified    Stage 2 chronic kidney disease     Past Medical History:   Diagnosis Date    Anxiety     Arthritis     Depression     Diabetes mellitus ([facility]CC)     type 2 per PCP note, oral med only;  pt denies, reports does not check BS    Hypercholesterolemia     Hypertension     Morbid obesity (HCC)     Personal history of colonic polyps     Status post total knee replacement, right 3/6/2019    Vitamin D deficiency      Past Surgical History:   Procedure Laterality Date    BREAST BIOPSY Left     ?  (Stereo Bx - negative per pt)     SECTION      CHOLECYSTECTOMY, LAPAROSCOPIC      COLONOSCOPY  2012    polyp removed, given 3-4 year recall    CYST REMOVAL      neck    HYSTERECTOMY (CERVIX STATUS UNKNOWN)      QUYNH AND BSO (CERVIX REMOVED)       Family History   Problem Relation Age of Onset    Breast Cancer Neg Hx     Hypertension Father     Heart Disease Father      Social History     Tobacco Use    Smoking status: Never    Smokeless tobacco: Never   Substance Use Topics    Alcohol use: No         Review of Systems  See above    OBJECTIVE:  /72   Pulse 64   Ht 1.511 m (4' 11.5\")   Wt 91.2 kg (201 lb)   SpO2 97%   BMI 39.92 kg/m²      Physical Exam  Constitutional:       General: She is not in acute distress.     Appearance: Normal appearance. She is not ill-appearing.   HENT:      Head: Normocephalic and atraumatic.   Cardiovascular:      Rate and Rhythm: Normal rate and regular rhythm.      Heart sounds: Normal heart sounds. No murmur heard.  Pulmonary:      Effort: Pulmonary effort is normal.

## 2024-09-05 ENCOUNTER — APPOINTMENT (RX ONLY)
Dept: URBAN - METROPOLITAN AREA CLINIC 329 | Facility: CLINIC | Age: 79
Setting detail: DERMATOLOGY
End: 2024-09-05

## 2024-09-05 DIAGNOSIS — L81.4 OTHER MELANIN HYPERPIGMENTATION: ICD-10-CM

## 2024-09-05 DIAGNOSIS — L57.8 OTHER SKIN CHANGES DUE TO CHRONIC EXPOSURE TO NONIONIZING RADIATION: ICD-10-CM | Status: STABLE

## 2024-09-05 DIAGNOSIS — D22 MELANOCYTIC NEVI: ICD-10-CM

## 2024-09-05 DIAGNOSIS — Z85.828 PERSONAL HISTORY OF OTHER MALIGNANT NEOPLASM OF SKIN: ICD-10-CM

## 2024-09-05 DIAGNOSIS — D18.0 HEMANGIOMA: ICD-10-CM

## 2024-09-05 DIAGNOSIS — L82.1 OTHER SEBORRHEIC KERATOSIS: ICD-10-CM

## 2024-09-05 DIAGNOSIS — T300 BLISTERS, EPIDERMAL LOSS [SECOND DEGREE], UNSPECIFIED SITE: ICD-10-CM

## 2024-09-05 PROBLEM — D22.5 MELANOCYTIC NEVI OF TRUNK: Status: ACTIVE | Noted: 2024-09-05

## 2024-09-05 PROBLEM — D22.61 MELANOCYTIC NEVI OF RIGHT UPPER LIMB, INCLUDING SHOULDER: Status: ACTIVE | Noted: 2024-09-05

## 2024-09-05 PROBLEM — D22.71 MELANOCYTIC NEVI OF RIGHT LOWER LIMB, INCLUDING HIP: Status: ACTIVE | Noted: 2024-09-05

## 2024-09-05 PROBLEM — D18.01 HEMANGIOMA OF SKIN AND SUBCUTANEOUS TISSUE: Status: ACTIVE | Noted: 2024-09-05

## 2024-09-05 PROBLEM — T21.22XA BURN OF SECOND DEGREE OF ABDOMINAL WALL, INITIAL ENCOUNTER: Status: ACTIVE | Noted: 2024-09-05

## 2024-09-05 PROCEDURE — ? ADDITIONAL NOTES

## 2024-09-05 PROCEDURE — ? TREATMENT REGIMEN

## 2024-09-05 PROCEDURE — ? DERMATOSCOPIC EVALUATION

## 2024-09-05 PROCEDURE — ? COUNSELING

## 2024-09-05 PROCEDURE — 99213 OFFICE O/P EST LOW 20 MIN: CPT

## 2024-09-05 ASSESSMENT — LOCATION SIMPLE DESCRIPTION DERM
LOCATION SIMPLE: RIGHT UPPER ARM
LOCATION SIMPLE: RIGHT POSTERIOR UPPER ARM
LOCATION SIMPLE: LEFT PRETIBIAL REGION
LOCATION SIMPLE: RIGHT POSTERIOR THIGH
LOCATION SIMPLE: RIGHT PRETIBIAL REGION
LOCATION SIMPLE: RIGHT FOREARM
LOCATION SIMPLE: CHEST
LOCATION SIMPLE: LEFT UPPER BACK
LOCATION SIMPLE: RIGHT ANKLE
LOCATION SIMPLE: ABDOMEN
LOCATION SIMPLE: RIGHT UPPER BACK
LOCATION SIMPLE: RIGHT THIGH
LOCATION SIMPLE: LEFT ANTERIOR NECK

## 2024-09-05 ASSESSMENT — LOCATION ZONE DERM
LOCATION ZONE: LEG
LOCATION ZONE: ARM
LOCATION ZONE: NECK
LOCATION ZONE: TRUNK

## 2024-09-05 ASSESSMENT — LOCATION DETAILED DESCRIPTION DERM
LOCATION DETAILED: LEFT MEDIAL UPPER BACK
LOCATION DETAILED: RIGHT SUPERIOR MEDIAL UPPER BACK
LOCATION DETAILED: RIGHT ANTERIOR PROXIMAL UPPER ARM
LOCATION DETAILED: LEFT DISTAL PRETIBIAL REGION
LOCATION DETAILED: RIGHT INFERIOR MEDIAL UPPER BACK
LOCATION DETAILED: RIGHT DISTAL POSTERIOR THIGH
LOCATION DETAILED: RIGHT PROXIMAL DORSAL FOREARM
LOCATION DETAILED: RIGHT DISTAL PRETIBIAL REGION
LOCATION DETAILED: RIGHT POSTERIOR ANKLE
LOCATION DETAILED: EPIGASTRIC SKIN
LOCATION DETAILED: LEFT INFERIOR ANTERIOR NECK
LOCATION DETAILED: RIGHT PROXIMAL POSTERIOR UPPER ARM
LOCATION DETAILED: RIGHT ANTERIOR DISTAL THIGH
LOCATION DETAILED: PERIUMBILICAL SKIN
LOCATION DETAILED: MIDDLE STERNUM

## 2024-09-05 NOTE — PROCEDURE: ADDITIONAL NOTES
PATIENT REQUESTING REFILL:      FLECAINIDE 100MG  METOPROLOL 25MG    PATIENT SCHEDULED AN APPT RICHARD  FOR 1/21/2020 @ 4:40PM      
Render Risk Assessment In Note?: no
Detail Level: Simple
Additional Notes: Look for bleeding or scabbing in the scar which may mean that your skin cancer is recurring.
Additional Notes: Pt has a burn on her abdomen from pouring hot tea.
Detail Level: Zone

## 2024-11-20 ENCOUNTER — LAB (OUTPATIENT)
Dept: FAMILY MEDICINE CLINIC | Facility: CLINIC | Age: 79
End: 2024-11-20

## 2024-11-20 DIAGNOSIS — E11.9 TYPE 2 DIABETES MELLITUS WITHOUT COMPLICATION, WITHOUT LONG-TERM CURRENT USE OF INSULIN (HCC): ICD-10-CM

## 2024-11-20 DIAGNOSIS — E78.1 PURE HYPERGLYCERIDEMIA: ICD-10-CM

## 2024-11-20 DIAGNOSIS — I10 PRIMARY HYPERTENSION: ICD-10-CM

## 2024-11-20 DIAGNOSIS — E78.00 PURE HYPERCHOLESTEROLEMIA: ICD-10-CM

## 2024-11-20 LAB
ALBUMIN SERPL-MCNC: 3.1 G/DL (ref 3.2–4.6)
ALBUMIN/GLOB SERPL: 1 (ref 1–1.9)
ALP SERPL-CCNC: 138 U/L (ref 35–104)
ALT SERPL-CCNC: 16 U/L (ref 8–45)
ANION GAP SERPL CALC-SCNC: 9 MMOL/L (ref 7–16)
AST SERPL-CCNC: 40 U/L (ref 15–37)
BILIRUB SERPL-MCNC: 0.9 MG/DL (ref 0–1.2)
BUN SERPL-MCNC: 19 MG/DL (ref 8–23)
CALCIUM SERPL-MCNC: 9.6 MG/DL (ref 8.8–10.2)
CHLORIDE SERPL-SCNC: 107 MMOL/L (ref 98–107)
CHOLEST SERPL-MCNC: 114 MG/DL (ref 0–200)
CO2 SERPL-SCNC: 29 MMOL/L (ref 20–29)
CREAT SERPL-MCNC: 1.04 MG/DL (ref 0.6–1.1)
EST. AVERAGE GLUCOSE BLD GHB EST-MCNC: 111 MG/DL
GLOBULIN SER CALC-MCNC: 3.1 G/DL (ref 2.3–3.5)
GLUCOSE SERPL-MCNC: 107 MG/DL (ref 70–99)
HBA1C MFR BLD: 5.5 % (ref 0–5.6)
HDLC SERPL-MCNC: 30 MG/DL (ref 40–60)
HDLC SERPL: 3.8 (ref 0–5)
LDLC SERPL CALC-MCNC: 57 MG/DL (ref 0–100)
POTASSIUM SERPL-SCNC: 4.5 MMOL/L (ref 3.5–5.1)
PROT SERPL-MCNC: 6.2 G/DL (ref 6.3–8.2)
SODIUM SERPL-SCNC: 145 MMOL/L (ref 136–145)
TRIGL SERPL-MCNC: 139 MG/DL (ref 0–150)
VLDLC SERPL CALC-MCNC: 28 MG/DL (ref 6–23)

## 2024-12-04 ENCOUNTER — OFFICE VISIT (OUTPATIENT)
Dept: FAMILY MEDICINE CLINIC | Facility: CLINIC | Age: 79
End: 2024-12-04

## 2024-12-04 VITALS
SYSTOLIC BLOOD PRESSURE: 106 MMHG | HEART RATE: 62 BPM | WEIGHT: 202 LBS | DIASTOLIC BLOOD PRESSURE: 62 MMHG | BODY MASS INDEX: 40.12 KG/M2 | OXYGEN SATURATION: 95 %

## 2024-12-04 DIAGNOSIS — E11.9 TYPE 2 DIABETES MELLITUS WITHOUT COMPLICATION, WITHOUT LONG-TERM CURRENT USE OF INSULIN (HCC): ICD-10-CM

## 2024-12-04 DIAGNOSIS — I10 PRIMARY HYPERTENSION: ICD-10-CM

## 2024-12-04 DIAGNOSIS — J06.9 VIRAL URI: ICD-10-CM

## 2024-12-04 DIAGNOSIS — E78.00 PURE HYPERCHOLESTEROLEMIA: ICD-10-CM

## 2024-12-04 DIAGNOSIS — F33.9 RECURRENT DEPRESSION (HCC): ICD-10-CM

## 2024-12-04 DIAGNOSIS — E78.1 PURE HYPERGLYCERIDEMIA: ICD-10-CM

## 2024-12-04 DIAGNOSIS — N18.2 STAGE 2 CHRONIC KIDNEY DISEASE: ICD-10-CM

## 2024-12-04 DIAGNOSIS — R05.1 ACUTE COUGH: Primary | ICD-10-CM

## 2024-12-04 RX ORDER — BUSPIRONE HYDROCHLORIDE 15 MG/1
15 TABLET ORAL 2 TIMES DAILY
Qty: 180 TABLET | Refills: 3 | Status: SHIPPED | OUTPATIENT
Start: 2024-12-04

## 2024-12-04 RX ORDER — CITALOPRAM HYDROBROMIDE 20 MG/1
20 TABLET ORAL DAILY
Qty: 90 TABLET | Refills: 3 | Status: SHIPPED | OUTPATIENT
Start: 2024-12-04

## 2024-12-04 RX ORDER — FENOFIBRATE 160 MG/1
160 TABLET ORAL DAILY
Qty: 90 TABLET | Refills: 3 | Status: SHIPPED | OUTPATIENT
Start: 2024-12-04

## 2024-12-04 NOTE — PROGRESS NOTES
\"Have you been to the ER, urgent care clinic since your last visit?  Hospitalized since your last visit?\"    NO    “Have you seen or consulted any other health care providers outside our system since your last visit?”    NO            
  Neurological:      Mental Status: She is alert and oriented to person, place, and time.   Psychiatric:         Mood and Affect: Mood normal.         Behavior: Behavior normal.         Thought Content: Thought content normal.         Judgment: Judgment normal.         Medical problems and test results were reviewed with the patient today.         ASSESSMENT and PLAN    1.  Cough.  Appears to be postnasal drip.  Treat with Nasacort and Zyrtec at bedtime.  If symptoms persist she is to return for further evaluation.  No evidence of infection.    2.  Postnasal drip.  As above.    3.  Diabetes.  A1c 5.5.  Previously 5.3.  Creatinine is 1.0.  Encouraged yearly retinal exams and daily feet exams.    4.  Hypertension.  /62.  No complaints and doing well.  Continue current therapy.    5.  High cholesterol.  LDL 57.  Previously 59.  Continue statin.  Liver enzymes remain normal.    6.  High triglycerides.  Triglycerides 139.  Dietary focus.  Continue fenofibrate.    7.  Chronic kidney disease.  Much improved with discontinuation of her diuretic.  Continue avoidance of nephrotoxic drugs.  Creatinine 1.0.    8.  Recurrent depression.  Denies breakthrough symptoms.    Elements of this note have been dictated using speech recognition software. As a result, errors of speech recognition may have occurred.

## 2025-02-11 ENCOUNTER — OFFICE VISIT (OUTPATIENT)
Dept: ORTHOPEDIC SURGERY | Age: 80
End: 2025-02-11
Payer: MEDICARE

## 2025-02-11 VITALS — WEIGHT: 193 LBS | BODY MASS INDEX: 38.33 KG/M2

## 2025-02-11 DIAGNOSIS — M17.12 PRIMARY OSTEOARTHRITIS OF LEFT KNEE: Primary | ICD-10-CM

## 2025-02-11 DIAGNOSIS — M25.562 LEFT KNEE PAIN, UNSPECIFIED CHRONICITY: ICD-10-CM

## 2025-02-11 DIAGNOSIS — Z96.651 STATUS POST TOTAL KNEE REPLACEMENT, RIGHT: ICD-10-CM

## 2025-02-11 DIAGNOSIS — M47.816 LUMBAR SPONDYLOSIS: ICD-10-CM

## 2025-02-11 PROCEDURE — G8427 DOCREV CUR MEDS BY ELIG CLIN: HCPCS | Performed by: ORTHOPAEDIC SURGERY

## 2025-02-11 PROCEDURE — 1090F PRES/ABSN URINE INCON ASSESS: CPT | Performed by: ORTHOPAEDIC SURGERY

## 2025-02-11 PROCEDURE — 1036F TOBACCO NON-USER: CPT | Performed by: ORTHOPAEDIC SURGERY

## 2025-02-11 PROCEDURE — G8417 CALC BMI ABV UP PARAM F/U: HCPCS | Performed by: ORTHOPAEDIC SURGERY

## 2025-02-11 PROCEDURE — G8399 PT W/DXA RESULTS DOCUMENT: HCPCS | Performed by: ORTHOPAEDIC SURGERY

## 2025-02-11 PROCEDURE — 1123F ACP DISCUSS/DSCN MKR DOCD: CPT | Performed by: ORTHOPAEDIC SURGERY

## 2025-02-11 PROCEDURE — 1159F MED LIST DOCD IN RCRD: CPT | Performed by: ORTHOPAEDIC SURGERY

## 2025-02-11 PROCEDURE — 99214 OFFICE O/P EST MOD 30 MIN: CPT | Performed by: ORTHOPAEDIC SURGERY

## 2025-02-11 RX ORDER — MECLIZINE HYDROCHLORIDE 25 MG/1
TABLET ORAL
COMMUNITY
Start: 2024-09-08

## 2025-02-11 NOTE — PROGRESS NOTES
Name: Jenn Gomez  YOB: 1945  Gender: female  MRN: [id number]    CC:   Chief Complaint   Patient presents with    Knee Pain     Left knee          HPI: Jenn Gomez is a 79 y.o. female with a PMHx of HTN, DM, HLD, s/p right TKA in 2019 ([facility]), and CKD  here for evaluation of left knee pain.  The pain has been present for 1 year and is becoming worse. The pain is primarily on the Medial side.   she describes the pain as dull, aching, throbbing, intermittent catching, and often feels unstable, and is intermittently more sharp  The pain is worse with going up and/or down stairs, rising after sitting, standing, walking, and at night, often waking them from sleep  The pain does radiate down the leg into the shin and foot.  she reports numbness and tingling down the leg.   Treatment so far has been activity modification, Tylenol, ibuprofen, and OTC topicals  with little relief.      No Known Allergies      Review of Systems:  As per HPI.  Pertinent positives and negatives are addressed with the patient, particularly those related to musculoskeletal concerns.   Non-orthopaedic concerns were referred back to the primary care physician.    PHYSICAL EXAMINATION:   The patient appears their stated age and they are in no distress.  Wt 87.5 kg (193 lb)   BMI 38.33 kg/m²       The lower extremities are as described below.  Circulation is normal with palpable pedal pulses bilaterally and no edema.  There is no lymph adenopathy in the popliteal or malleolar region.  The skin is without stasis disease distally bilaterally.  Sensation is intact to light touch bilaterally.  There is mild tenderness to palpation over the medial joint line of the left knee(s)  The gait is noted to be with a slight trendelenburg and antalgia  Range of motion is 0-120 degrees on the right and 0-120 degrees on the left.  left knee: There is 2mm of anterior/posterior translation and 2mm of medial/lateral instability bilaterally.

## 2025-02-25 ENCOUNTER — OFFICE VISIT (OUTPATIENT)
Age: 80
End: 2025-02-25
Payer: MEDICARE

## 2025-02-25 DIAGNOSIS — M51.369 DEGENERATION OF INTERVERTEBRAL DISC OF LUMBAR REGION, UNSPECIFIED WHETHER PAIN PRESENT: Primary | ICD-10-CM

## 2025-02-25 DIAGNOSIS — M54.9 BACK PAIN, UNSPECIFIED BACK LOCATION, UNSPECIFIED BACK PAIN LATERALITY, UNSPECIFIED CHRONICITY: ICD-10-CM

## 2025-02-25 PROCEDURE — 99213 OFFICE O/P EST LOW 20 MIN: CPT | Performed by: ORTHOPAEDIC SURGERY

## 2025-02-25 PROCEDURE — G8417 CALC BMI ABV UP PARAM F/U: HCPCS | Performed by: ORTHOPAEDIC SURGERY

## 2025-02-25 PROCEDURE — 1159F MED LIST DOCD IN RCRD: CPT | Performed by: ORTHOPAEDIC SURGERY

## 2025-02-25 PROCEDURE — 1090F PRES/ABSN URINE INCON ASSESS: CPT | Performed by: ORTHOPAEDIC SURGERY

## 2025-02-25 PROCEDURE — 1123F ACP DISCUSS/DSCN MKR DOCD: CPT | Performed by: ORTHOPAEDIC SURGERY

## 2025-02-25 PROCEDURE — 1036F TOBACCO NON-USER: CPT | Performed by: ORTHOPAEDIC SURGERY

## 2025-02-25 PROCEDURE — G8427 DOCREV CUR MEDS BY ELIG CLIN: HCPCS | Performed by: ORTHOPAEDIC SURGERY

## 2025-02-25 PROCEDURE — G8399 PT W/DXA RESULTS DOCUMENT: HCPCS | Performed by: ORTHOPAEDIC SURGERY

## 2025-02-26 NOTE — PROGRESS NOTES
Name: Jenn Gomez  YOB: 1945  Gender: female  MRN: [id number]  Age: 79 y.o.      Chief Complaint: Right-sided low back pain    History of Present Illness:      This is a very pleasant 79 y.o. female who presents with a 6-month history of right-sided low back pain.  She denies any pain radiating down her leg.  Her pain is 8/10 in severity.  She describes the pain as dull and achy.  She has had no treatment for this.  She does have a history of a right knee replacement.  She does have some pain in her right knee.      Medications:       Current Outpatient Medications:     LISINOPRIL PO, lisinopril, Disp: , Rfl:     meclizine (ANTIVERT) 25 MG tablet, by NOT APPLICABLE route, Disp: , Rfl:     busPIRone (BUSPAR) 15 MG tablet, Take 15 mg by mouth 2 times daily, Disp: 180 tablet, Rfl: 3    citalopram (CELEXA) 20 MG tablet, Take 1 tablet by mouth daily, Disp: 90 tablet, Rfl: 3    fenofibrate 160 MG tablet, Take 1 tablet by mouth daily, Disp: 90 tablet, Rfl: 3    hydrocortisone (ANUSOL-HC) 25 MG suppository, 14 suppositories, Disp: , Rfl:     olmesartan (BENICAR) 20 MG tablet, Take 1 tablet by mouth daily, Disp: 30 tablet, Rfl: 3    atorvastatin (LIPITOR) 10 MG tablet, Take 1 tablet by mouth daily, Disp: 90 tablet, Rfl: 3    Cholecalciferol 50 MCG (2000 UT) TABS, Take by mouth, Disp: , Rfl:     Allergies:    No Known Allergies      Physical Exam:     This is a well developed well nourished female adult in no acute distress.     Mood and affect are appropriate.    Oriented to person, place, and time.    Respirations are unlabored and there is no evidence of cyanosis    The patient ambulates with an normal gait.     There is minimal hip irritability with internal or external rotation bilaterally.      Pulses in the lower extremities are palpable and equal  Sensory testing:     L2 L3 L4 L5 S1 S2   Right 2 2 2 2 2 2   Left 2 2 2 2 2 2     0=absent; 1=altered; 2=normal; NT= not

## 2025-03-05 ENCOUNTER — OFFICE VISIT (OUTPATIENT)
Dept: FAMILY MEDICINE CLINIC | Facility: CLINIC | Age: 80
End: 2025-03-05

## 2025-03-05 ENCOUNTER — TELEPHONE (OUTPATIENT)
Dept: ORTHOPEDIC SURGERY | Age: 80
End: 2025-03-05

## 2025-03-05 VITALS
SYSTOLIC BLOOD PRESSURE: 116 MMHG | HEIGHT: 60 IN | WEIGHT: 195 LBS | DIASTOLIC BLOOD PRESSURE: 64 MMHG | HEART RATE: 54 BPM | OXYGEN SATURATION: 96 % | BODY MASS INDEX: 38.28 KG/M2

## 2025-03-05 DIAGNOSIS — M25.562 CHRONIC PAIN OF LEFT KNEE: ICD-10-CM

## 2025-03-05 DIAGNOSIS — N18.30 TYPE 2 DIABETES MELLITUS WITH STAGE 3 CHRONIC KIDNEY DISEASE, WITHOUT LONG-TERM CURRENT USE OF INSULIN, UNSPECIFIED WHETHER STAGE 3A OR 3B CKD (HCC): ICD-10-CM

## 2025-03-05 DIAGNOSIS — E78.1 PURE HYPERGLYCERIDEMIA: ICD-10-CM

## 2025-03-05 DIAGNOSIS — I10 PRIMARY HYPERTENSION: ICD-10-CM

## 2025-03-05 DIAGNOSIS — E11.22 TYPE 2 DIABETES MELLITUS WITH STAGE 3 CHRONIC KIDNEY DISEASE, WITHOUT LONG-TERM CURRENT USE OF INSULIN, UNSPECIFIED WHETHER STAGE 3A OR 3B CKD (HCC): ICD-10-CM

## 2025-03-05 DIAGNOSIS — E55.9 VITAMIN D DEFICIENCY: ICD-10-CM

## 2025-03-05 DIAGNOSIS — G89.29 CHRONIC PAIN OF LEFT KNEE: ICD-10-CM

## 2025-03-05 DIAGNOSIS — Z00.00 MEDICARE ANNUAL WELLNESS VISIT, SUBSEQUENT: Primary | ICD-10-CM

## 2025-03-05 DIAGNOSIS — E78.00 PURE HYPERCHOLESTEROLEMIA: ICD-10-CM

## 2025-03-05 DIAGNOSIS — G89.29 CHRONIC MIDLINE LOW BACK PAIN WITHOUT SCIATICA: ICD-10-CM

## 2025-03-05 DIAGNOSIS — F41.9 ANXIETY: ICD-10-CM

## 2025-03-05 DIAGNOSIS — F33.9 RECURRENT DEPRESSION: ICD-10-CM

## 2025-03-05 DIAGNOSIS — N18.30 STAGE 3 CHRONIC KIDNEY DISEASE, UNSPECIFIED WHETHER STAGE 3A OR 3B CKD (HCC): ICD-10-CM

## 2025-03-05 DIAGNOSIS — M54.50 CHRONIC MIDLINE LOW BACK PAIN WITHOUT SCIATICA: ICD-10-CM

## 2025-03-05 LAB
25(OH)D3 SERPL-MCNC: 43.4 NG/ML (ref 30–100)
ALBUMIN SERPL-MCNC: 3.5 G/DL (ref 3.2–4.6)
ALBUMIN/GLOB SERPL: 1 (ref 1–1.9)
ALP SERPL-CCNC: 130 U/L (ref 35–104)
ALT SERPL-CCNC: 19 U/L (ref 8–45)
ANION GAP SERPL CALC-SCNC: 11 MMOL/L (ref 7–16)
AST SERPL-CCNC: 45 U/L (ref 15–37)
BASOPHILS # BLD: 0.07 K/UL (ref 0–0.2)
BASOPHILS NFR BLD: 1 % (ref 0–2)
BILIRUB SERPL-MCNC: 0.9 MG/DL (ref 0–1.2)
BUN SERPL-MCNC: 22 MG/DL (ref 8–23)
CALCIUM SERPL-MCNC: 9.9 MG/DL (ref 8.8–10.2)
CHLORIDE SERPL-SCNC: 106 MMOL/L (ref 98–107)
CHOLEST SERPL-MCNC: 139 MG/DL (ref 0–200)
CO2 SERPL-SCNC: 28 MMOL/L (ref 20–29)
CREAT SERPL-MCNC: 1.09 MG/DL (ref 0.6–1.1)
CREAT UR-MCNC: 231 MG/DL (ref 28–217)
DIFFERENTIAL METHOD BLD: NORMAL
EOSINOPHIL # BLD: 0.19 K/UL (ref 0–0.8)
EOSINOPHIL NFR BLD: 2.7 % (ref 0.5–7.8)
ERYTHROCYTE [DISTWIDTH] IN BLOOD BY AUTOMATED COUNT: 14.2 % (ref 11.9–14.6)
EST. AVERAGE GLUCOSE BLD GHB EST-MCNC: 107 MG/DL
GLOBULIN SER CALC-MCNC: 3.4 G/DL (ref 2.3–3.5)
GLUCOSE SERPL-MCNC: 94 MG/DL (ref 70–99)
HBA1C MFR BLD: 5.3 % (ref 0–5.6)
HCT VFR BLD AUTO: 43.8 % (ref 35.8–46.3)
HDLC SERPL-MCNC: 38 MG/DL (ref 40–60)
HDLC SERPL: 3.6 (ref 0–5)
HGB BLD-MCNC: 14.6 G/DL (ref 11.7–15.4)
IMM GRANULOCYTES # BLD AUTO: 0.02 K/UL (ref 0–0.5)
IMM GRANULOCYTES NFR BLD AUTO: 0.3 % (ref 0–5)
LDLC SERPL CALC-MCNC: 76 MG/DL (ref 0–100)
LYMPHOCYTES # BLD: 2.85 K/UL (ref 0.5–4.6)
LYMPHOCYTES NFR BLD: 40.8 % (ref 13–44)
MCH RBC QN AUTO: 31.5 PG (ref 26.1–32.9)
MCHC RBC AUTO-ENTMCNC: 33.3 G/DL (ref 31.4–35)
MCV RBC AUTO: 94.4 FL (ref 82–102)
MICROALBUMIN UR-MCNC: 1.43 MG/DL (ref 0–20)
MICROALBUMIN/CREAT UR-RTO: 6 MG/G (ref 0–30)
MONOCYTES # BLD: 0.64 K/UL (ref 0.1–1.3)
MONOCYTES NFR BLD: 9.2 % (ref 4–12)
NEUTS SEG # BLD: 3.21 K/UL (ref 1.7–8.2)
NEUTS SEG NFR BLD: 46 % (ref 43–78)
NRBC # BLD: 0 K/UL (ref 0–0.2)
PLATELET # BLD AUTO: 272 K/UL (ref 150–450)
PMV BLD AUTO: 9.6 FL (ref 9.4–12.3)
POTASSIUM SERPL-SCNC: 4 MMOL/L (ref 3.5–5.1)
PROT SERPL-MCNC: 6.9 G/DL (ref 6.3–8.2)
RBC # BLD AUTO: 4.64 M/UL (ref 4.05–5.2)
SODIUM SERPL-SCNC: 144 MMOL/L (ref 136–145)
TRIGL SERPL-MCNC: 124 MG/DL (ref 0–150)
TSH, 3RD GENERATION: 1.68 UIU/ML (ref 0.27–4.2)
VLDLC SERPL CALC-MCNC: 25 MG/DL (ref 6–23)
WBC # BLD AUTO: 7 K/UL (ref 4.3–11.1)

## 2025-03-05 RX ORDER — OLMESARTAN MEDOXOMIL 20 MG/1
20 TABLET ORAL DAILY
Qty: 90 TABLET | Refills: 3 | Status: SHIPPED | OUTPATIENT
Start: 2025-03-05

## 2025-03-05 RX ORDER — ATORVASTATIN CALCIUM 10 MG/1
10 TABLET, FILM COATED ORAL DAILY
Qty: 90 TABLET | Refills: 3 | Status: SHIPPED | OUTPATIENT
Start: 2025-03-05

## 2025-03-05 SDOH — ECONOMIC STABILITY: FOOD INSECURITY: WITHIN THE PAST 12 MONTHS, THE FOOD YOU BOUGHT JUST DIDN'T LAST AND YOU DIDN'T HAVE MONEY TO GET MORE.: NEVER TRUE

## 2025-03-05 SDOH — ECONOMIC STABILITY: FOOD INSECURITY: WITHIN THE PAST 12 MONTHS, YOU WORRIED THAT YOUR FOOD WOULD RUN OUT BEFORE YOU GOT MONEY TO BUY MORE.: NEVER TRUE

## 2025-03-05 ASSESSMENT — PATIENT HEALTH QUESTIONNAIRE - PHQ9
SUM OF ALL RESPONSES TO PHQ QUESTIONS 1-9: 0
2. FEELING DOWN, DEPRESSED OR HOPELESS: NOT AT ALL
SUM OF ALL RESPONSES TO PHQ QUESTIONS 1-9: 0
1. LITTLE INTEREST OR PLEASURE IN DOING THINGS: NOT AT ALL

## 2025-03-05 NOTE — TELEPHONE ENCOUNTER
Pt  called in and I faxed her  PT  order to  [facility] in [place]  at  fax #  [phone] and I received a completed  trip  report  confirmation    Dt  3/5/25

## 2025-03-05 NOTE — PROGRESS NOTES
SUBJECTIVE:   Jenn Gomez is a 79 y.o. female who has a past medical history significant for hypertension, diabetes, high cholesterol, high triglycerides, vitamin D deficiency, anxiety, allergic rhinitis and recurrent depression.  Review of systems reveals no complaints of chest pain, shortness of breath, orthopnea or PND.  GI and  review of systems is unremarkable.  Patient reports she continues to have chronic low back pain.  She has been diagnosed with spinal stenosis and states that her orthopedist has referred her to physical therapy which she will be starting in the near future.  In addition she reports that she has been scheduled for a total left knee replacement.  This is looking like probably ann be August before it can be done.    HPI  See above    Past Medical History, Past Surgical History, Family history, Social History, and Medications were all reviewed with the patient today and updated as necessary.       Current Outpatient Medications   Medication Sig Dispense Refill    olmesartan (BENICAR) 20 MG tablet Take 1 tablet by mouth daily 90 tablet 3    atorvastatin (LIPITOR) 10 MG tablet Take 1 tablet by mouth daily 90 tablet 3    busPIRone (BUSPAR) 15 MG tablet Take 15 mg by mouth 2 times daily 180 tablet 3    citalopram (CELEXA) 20 MG tablet Take 1 tablet by mouth daily 90 tablet 3    fenofibrate 160 MG tablet Take 1 tablet by mouth daily 90 tablet 3    Cholecalciferol 50 MCG (2000 UT) TABS Take by mouth       No current facility-administered medications for this visit.     No Known Allergies  Patient Active Problem List   Diagnosis    Vitamin D deficiency    Pure hypercholesterolemia    Pure hyperglyceridemia    Snoring    Status post total knee replacement, right    Osteoarthritis of right knee    Type 2 diabetes mellitus without complication (HCC)    Depression    Osteoarthritis    Obesity, morbid    Essential hypertension with goal blood pressure less than 130/85    Major depressive 
\"Have you been to the ER, urgent care clinic since your last visit?  Hospitalized since your last visit?\"    NO    “Have you seen or consulted any other health care providers outside our system since your last visit?”    NO            
mouth daily Yes Davis Salas MD   fenofibrate 160 MG tablet Take 1 tablet by mouth daily Yes Davis Salas MD   olmesartan (BENICAR) 20 MG tablet Take 1 tablet by mouth daily Yes Davis Salas MD   atorvastatin (LIPITOR) 10 MG tablet Take 1 tablet by mouth daily Yes Davis Salsa MD   Cholecalciferol 50 MCG (2000 UT) TABS Take by mouth Yes Automatic Reconciliation, Ar   LISINOPRIL PO lisinopril  Patient not taking: Reported on 3/5/2025  Tez Ignacio MD   meclizine (ANTIVERT) 25 MG tablet by NOT APPLICABLE route  Patient not taking: Reported on 3/5/2025  Tez Ignacio MD   hydrocortisone (ANUSOL-HC) 25 MG suppository 14 suppositories  Patient not taking: Reported on 3/5/2025  Tez Ignacio MD       CareTeam (Including outside providers/suppliers regularly involved in providing care):   Patient Care Team:  Davis Salas MD as PCP - General  Davis Salas MD as PCP - Empaneled Provider     Recommendations for Preventive Services Due: see orders and patient instructions/AVS.  Recommended screening schedule for the next 5-10 years is provided to the patient in written form: see Patient Instructions/AVS.     Reviewed and updated this visit:  Tobacco  Allergies  Meds  Sexual Hx

## 2025-03-05 NOTE — PATIENT INSTRUCTIONS
device in the bathroom with you.   Where can you learn more?  Go to https://[email or web address].net/patientEd and enter G117 to learn more about \"Preventing Falls: Care Instructions.\"  Current as of: July 31, 2024  Content Version: 14.3  © 2024 [employer].   Care instructions adapted under license by [employer]. If you have questions about a medical condition or this instruction, always ask your healthcare professional. [employer] disclaims any warranty or liability for your use of this information.         Learning About Stress  What is stress?     Stress is your body's response to a hard situation. Your body can have a physical, emotional, or mental response. Stress is a fact of life for most people, and it affects everyone differently. What causes stress for you may not be stressful for someone else.  A lot of things can cause stress. You may feel stress when you go on a job interview, take a test, or run a race. This kind of short-term stress is normal and even useful. It can help you if you need to work hard or react quickly. For example, stress can help you finish an important job on time.  Long-term stress is caused by ongoing stressful situations or events. Examples of long-term stress include long-term health problems, ongoing problems at work, or conflicts in your family. Long-term stress can harm your health.  How does stress affect your health?  When you are stressed, your body responds as though you are in danger. It makes hormones that speed up your heart, make you breathe faster, and give you a burst of energy. This is called the fight-or-flight stress response. If the stress is over quickly, your body goes back to normal and no harm is done.  But if stress happens too often or lasts too long, it can have bad effects. Long-term stress can make you more likely to get sick, and it can make symptoms of some diseases worse. If you tense up when you are stressed, you may develop neck,

## 2025-03-10 ENCOUNTER — TELEPHONE (OUTPATIENT)
Dept: ORTHOPEDIC SURGERY | Age: 80
End: 2025-03-10

## 2025-03-12 DIAGNOSIS — M17.12 PRIMARY OSTEOARTHRITIS OF LEFT KNEE: Primary | ICD-10-CM

## 2025-03-13 ENCOUNTER — PREP FOR PROCEDURE (OUTPATIENT)
Dept: ORTHOPEDIC SURGERY | Age: 80
End: 2025-03-13

## 2025-03-13 DIAGNOSIS — M17.12 PRIMARY OSTEOARTHRITIS OF LEFT KNEE: Primary | ICD-10-CM

## 2025-03-13 RX ORDER — SODIUM CHLORIDE 9 MG/ML
INJECTION, SOLUTION INTRAVENOUS PRN
Status: CANCELLED | OUTPATIENT
Start: 2025-03-13

## 2025-03-13 RX ORDER — SODIUM CHLORIDE 0.9 % (FLUSH) 0.9 %
5-40 SYRINGE (ML) INJECTION PRN
Status: CANCELLED | OUTPATIENT
Start: 2025-03-13

## 2025-03-13 RX ORDER — ACETAMINOPHEN 325 MG/1
1000 TABLET ORAL ONCE
Status: CANCELLED | OUTPATIENT
Start: 2025-03-13 | End: 2025-03-13

## 2025-03-13 RX ORDER — SODIUM CHLORIDE 0.9 % (FLUSH) 0.9 %
5-40 SYRINGE (ML) INJECTION EVERY 12 HOURS SCHEDULED
Status: CANCELLED | OUTPATIENT
Start: 2025-03-13

## 2025-03-18 ENCOUNTER — HOSPITAL ENCOUNTER (OUTPATIENT)
Dept: REHABILITATION | Age: 80
Discharge: HOME | End: 2025-03-21
Payer: MEDICARE

## 2025-03-18 ENCOUNTER — HOSPITAL ENCOUNTER (OUTPATIENT)
Dept: SURGERY | Age: 80
Discharge: HOME | End: 2025-03-21
Payer: MEDICARE

## 2025-03-18 VITALS
BODY MASS INDEX: 39.31 KG/M2 | RESPIRATION RATE: 18 BRPM | HEIGHT: 59 IN | SYSTOLIC BLOOD PRESSURE: 153 MMHG | HEART RATE: 59 BPM | OXYGEN SATURATION: 95 % | WEIGHT: 195 LBS | DIASTOLIC BLOOD PRESSURE: 70 MMHG | TEMPERATURE: 97.6 F

## 2025-03-18 DIAGNOSIS — M17.12 PRIMARY OSTEOARTHRITIS OF LEFT KNEE: ICD-10-CM

## 2025-03-18 LAB
ALBUMIN SERPL-MCNC: 2.8 G/DL (ref 3.2–4.6)
ALBUMIN/GLOB SERPL: 0.8 (ref 1–1.9)
ALP SERPL-CCNC: 115 U/L (ref 35–104)
ALT SERPL-CCNC: 15 U/L (ref 8–45)
ANION GAP SERPL CALC-SCNC: 10 MMOL/L (ref 7–16)
AST SERPL-CCNC: 39 U/L (ref 15–37)
BASOPHILS # BLD: 0.05 K/UL (ref 0–0.2)
BASOPHILS NFR BLD: 1 % (ref 0–2)
BILIRUB SERPL-MCNC: 0.8 MG/DL (ref 0–1.2)
BUN SERPL-MCNC: 24 MG/DL (ref 8–23)
CALCIUM SERPL-MCNC: 9.3 MG/DL (ref 8.8–10.2)
CHLORIDE SERPL-SCNC: 105 MMOL/L (ref 98–107)
CO2 SERPL-SCNC: 27 MMOL/L (ref 20–29)
CREAT SERPL-MCNC: 1.01 MG/DL (ref 0.6–1.1)
DIFFERENTIAL METHOD BLD: NORMAL
EKG ATRIAL RATE: 56 BPM
EKG DIAGNOSIS: NORMAL
EKG P AXIS: 25 DEGREES
EKG P-R INTERVAL: 166 MS
EKG Q-T INTERVAL: 454 MS
EKG QRS DURATION: 92 MS
EKG QTC CALCULATION (BAZETT): 438 MS
EKG R AXIS: -8 DEGREES
EKG T AXIS: 19 DEGREES
EKG VENTRICULAR RATE: 56 BPM
EOSINOPHIL # BLD: 0.18 K/UL (ref 0–0.8)
EOSINOPHIL NFR BLD: 3.7 % (ref 0.5–7.8)
ERYTHROCYTE [DISTWIDTH] IN BLOOD BY AUTOMATED COUNT: 13.6 % (ref 11.9–14.6)
EST. AVERAGE GLUCOSE BLD GHB EST-MCNC: 103 MG/DL
GLOBULIN SER CALC-MCNC: 3.3 G/DL (ref 2.3–3.5)
GLUCOSE SERPL-MCNC: 92 MG/DL (ref 70–99)
HBA1C MFR BLD: 5.2 % (ref 0–5.6)
HCT VFR BLD AUTO: 39.9 % (ref 35.8–46.3)
HGB BLD-MCNC: 13 G/DL (ref 11.7–15.4)
IMM GRANULOCYTES # BLD AUTO: 0.01 K/UL (ref 0–0.5)
IMM GRANULOCYTES NFR BLD AUTO: 0.2 % (ref 0–5)
INR PPP: 1.1
LYMPHOCYTES # BLD: 1.78 K/UL (ref 0.5–4.6)
LYMPHOCYTES NFR BLD: 36.8 % (ref 13–44)
MCH RBC QN AUTO: 31.3 PG (ref 26.1–32.9)
MCHC RBC AUTO-ENTMCNC: 32.6 G/DL (ref 31.4–35)
MCV RBC AUTO: 95.9 FL (ref 82–102)
MONOCYTES # BLD: 0.49 K/UL (ref 0.1–1.3)
MONOCYTES NFR BLD: 10.1 % (ref 4–12)
MRSA DNA SPEC QL NAA+PROBE: NOT DETECTED
NEUTS SEG # BLD: 2.33 K/UL (ref 1.7–8.2)
NEUTS SEG NFR BLD: 48.2 % (ref 43–78)
NRBC # BLD: 0 K/UL (ref 0–0.2)
PLATELET # BLD AUTO: 221 K/UL (ref 150–450)
PMV BLD AUTO: 9.4 FL (ref 9.4–12.3)
POTASSIUM SERPL-SCNC: 4 MMOL/L (ref 3.5–5.1)
PROT SERPL-MCNC: 6.2 G/DL (ref 6.3–8.2)
PROTHROMBIN TIME: 14.8 SEC (ref 11.3–14.9)
RBC # BLD AUTO: 4.16 M/UL (ref 4.05–5.2)
S AUREUS CPE NOSE QL NAA+PROBE: NOT DETECTED
SODIUM SERPL-SCNC: 142 MMOL/L (ref 136–145)
WBC # BLD AUTO: 4.8 K/UL (ref 4.3–11.1)

## 2025-03-18 PROCEDURE — 98960 EDU&TRN PT SELF-MGMT NQHP 1: CPT

## 2025-03-18 PROCEDURE — 97161 PT EVAL LOW COMPLEX 20 MIN: CPT

## 2025-03-18 PROCEDURE — 87641 MR-STAPH DNA AMP PROBE: CPT

## 2025-03-18 PROCEDURE — 85610 PROTHROMBIN TIME: CPT

## 2025-03-18 PROCEDURE — 80053 COMPREHEN METABOLIC PANEL: CPT

## 2025-03-18 PROCEDURE — 85025 COMPLETE CBC W/AUTO DIFF WBC: CPT

## 2025-03-18 PROCEDURE — 83036 HEMOGLOBIN GLYCOSYLATED A1C: CPT

## 2025-03-18 PROCEDURE — 93010 ELECTROCARDIOGRAM REPORT: CPT | Performed by: INTERNAL MEDICINE

## 2025-03-18 PROCEDURE — 93005 ELECTROCARDIOGRAM TRACING: CPT | Performed by: ANESTHESIOLOGY

## 2025-03-18 PROCEDURE — 94760 N-INVAS EAR/PLS OXIMETRY 1: CPT

## 2025-03-18 ASSESSMENT — PROMIS GLOBAL HEALTH SCALE
HOW IS THE PROMIS V1.1 BEING ADMINISTERED?: PAPER
IN GENERAL, HOW WOULD YOU RATE YOUR SATISFACTION WITH YOUR SOCIAL ACTIVITIES AND RELATIONSHIPS [ON A SCALE OF 1 (POOR) TO 5 (EXCELLENT)]?: VERY GOOD
IN THE PAST 7 DAYS, HOW OFTEN HAVE YOU BEEN BOTHERED BY EMOTIONAL PROBLEMS, SUCH AS FEELING ANXIOUS, DEPRESSED, OR IRRITABLE [ON A SCALE FROM 1 (NEVER) TO 5 (ALWAYS)]?: SOMETIMES
IN GENERAL, HOW WOULD YOU RATE YOUR MENTAL HEALTH, INCLUDING YOUR MOOD AND YOUR ABILITY TO THINK [ON A SCALE OF 1 (POOR) TO 5 (EXCELLENT)]?: GOOD
IN GENERAL, PLEASE RATE HOW WELL YOU CARRY OUT YOUR USUAL SOCIAL ACTIVITIES (INCLUDES ACTIVITIES AT HOME, AT WORK, AND IN YOUR COMMUNITY, AND RESPONSIBILITIES AS A PARENT, CHILD, SPOUSE, EMPLOYEE, FRIEND, ETC) [ON A SCALE OF 1 (POOR) TO 5 (EXCELLENT)]?: VERY GOOD
SUM OF RESPONSES TO QUESTIONS 3, 6, 7, & 8: 17
IN THE PAST 7 DAYS, HOW WOULD YOU RATE YOUR PAIN ON AVERAGE [ON A SCALE FROM 0 (NO PAIN) TO 10 (WORST IMAGINABLE PAIN)]?: 7
IN THE PAST 7 DAYS, HOW WOULD YOU RATE YOUR FATIGUE ON AVERAGE [ON A SCALE FROM 1 (NONE) TO 5 (VERY SEVERE)]?: MILD
IN GENERAL, HOW WOULD YOU RATE YOUR PHYSICAL HEALTH [ON A SCALE OF 1 (POOR) TO 5 (EXCELLENT)]?: GOOD
WHO IS THE PERSON COMPLETING THE PROMIS V1.1 SURVEY?: SELF
SUM OF RESPONSES TO QUESTIONS 2, 4, 5, & 10: 14
IN GENERAL, WOULD YOU SAY YOUR QUALITY OF LIFE IS...[ON A SCALE OF 1 (POOR) TO 5 (EXCELLENT)]: VERY GOOD
TO WHAT EXTENT ARE YOU ABLE TO CARRY OUT YOUR EVERYDAY PHYSICAL ACTIVITIES SUCH AS WALKING, CLIMBING STAIRS, CARRYING GROCERIES, OR MOVING A CHAIR [ON A SCALE OF 1 (NOT AT ALL) TO 5 (COMPLETELY)]?: MODERATELY
IN GENERAL, WOULD YOU SAY YOUR HEALTH IS...[ON A SCALE OF 1 (POOR) TO 5 (EXCELLENT)]: VERY GOOD

## 2025-03-18 ASSESSMENT — KOOS JR
TWISING OR PIVOTING ON KNEE: MODERATE
RISING FROM SITTING: MODERATE
STANDING UPRIGHT: MODERATE
STRAIGHTENING KNEE FULLY: MILD
KOOS JR TOTAL INTERVAL SCORE: 57.14
BENDING TO THE FLOOR TO PICK UP OBJECT: MODERATE
GOING UP OR DOWN STAIRS: SEVERE

## 2025-03-18 ASSESSMENT — PAIN DESCRIPTION - ORIENTATION
ORIENTATION: LEFT
ORIENTATION: LEFT;ANTERIOR;INNER;OUTER

## 2025-03-18 ASSESSMENT — PAIN DESCRIPTION - DESCRIPTORS
DESCRIPTORS: ACHING;SHARP;STABBING
DESCRIPTORS: ACHING

## 2025-03-18 ASSESSMENT — PULMONARY FUNCTION TESTS
FEV1 (LITERS): 1.48
FEV1 (%PREDICTED): 103

## 2025-03-18 ASSESSMENT — PAIN DESCRIPTION - LOCATION
LOCATION: KNEE
LOCATION: KNEE

## 2025-03-18 ASSESSMENT — PAIN SCALES - GENERAL
PAINLEVEL_OUTOF10: 6
PAINLEVEL_OUTOF10: 6

## 2025-03-18 NOTE — PERIOP NOTE
PLEASE CONTINUE TAKING ALL PRESCRIPTION MEDICATIONS UP TO THE DAY OF SURGERY UNLESS OTHERWISE DIRECTED BELOW. You may take Tylenol, allergy, and/or indigestion medications.     TAKE ONLY THESE MEDICATIONS ON THE DAY OF SURGERY ON 4/9/25      NONE           DISCONTINUE all vitamins, herbals, and supplements 3 weeks prior to surgery. DISCONTINUE Non-Steroidal Anti-Inflammatory (NSAIDS) such as Advil, Ibuprofen, Motrin, Naproxen, and Aleve 5 days prior to surgery unless instructed to hold longer by surgeon.     Home Medications to Hold- please continue all other medications except these.            Comments      On the day before surgery (4/8/25) please take 2 Tylenol in the morning and then again before bed. You may use either regular or extra strength.      Bring: Photo ID, Insurance card, Jazmyne-hex soap, Incentive Spirometer        Please do not bring home medications with you on the day of surgery unless otherwise directed by your nurse.  If you are instructed to bring home medications, please give them to your nurse as they will be administered by the nursing staff.    If you have any questions, please call [facility] [phone].    A copy of this note was provided to the patient for reference.

## 2025-03-18 NOTE — PERIOP NOTE
CBC, CMP, PT/INR, A1C are within anesthesia guidelines except for elevated AST; anesthesia to review. Labs automatically routed to ordering provider via Epic documentation.

## 2025-03-18 NOTE — PERIOP NOTE
Patient verified name and .    Order for consent was found in EHR and does match case posting; patient verified.     Type 3 surgery, joint-camp assessment complete.    Labs per surgeon: CBC, CMP, A1C, PT/INR ; results pending.  Labs per anesthesia protocol: no additional labs needed.   EKG: Completed today and within anesthesia protocol.     MRSA/MSSA swab collected per policy. MD to consult pharmacy to dose Vanc if appropriate.     Hospital approved surgical skin cleanser and instructions to return bottle on DOS given per hospital policy.    Patient provided with handouts including Guide to Surgery, Pain Management, Preventing Surgical Site Infections, and [facility] Brochure.    Patient answered medical/surgical history questions at their best of ability. All prior to admission medications documented in Epic. Original medication prescription bottles was not visualized during patient appointment.     Patient instructed to hold all vitamins 3 weeks prior to surgery and NSAIDS 5 days prior to surgery.     Patient teach back successful and patient demonstrates knowledge of instruction.

## 2025-03-18 NOTE — PROGRESS NOTES
03/18/25 1000   Treatment   Treatment Type Bedside spirometry   Breath Sounds   Breath Sounds Bilateral Clear   Oxygen Therapy/Pulse Ox   O2 Therapy Room air   Pulse 59   SpO2 95 %   Pulse Oximeter Device Mode Intermittent   $Pulse Oximeter $Spot check (single)   Bedside Spirometry   FEV-1/Actual (Liters) 1.48 Liters   FEV-1/Predicted (Liters) 103 Liters     Initial respiratory Assessment completed with pt. Pt was interviewed and evaluated in Joint camp prior to surgery.  Patient ID:  Jenn Gomez  [id number]  79 y.o.  1945  Surgeon: Dr. Coffman  Date of Surgery: @[facility]5/7/2025  Procedure: Total Left Knee Arthroplasty  Primary Care Physician: Davis Salas [phone]  Specialists:    Pt taught proper COUGH technique  IS REVIEWED WITH PT AS WELL AS BENEFITS OF USING IS IN SEDENTARY PTS.  DIAPHRAGMATIC BREATHING EXERCISE INSTRUCTIONS GIVEN    History of smoking:   DENIES                 Quit date:         Secondhand smoke:DENIES    Past procedures with Oxygen desaturation or delayed awakening:DENIES     Respiratory history:DENIES SOB                            HX OF  MILD REACTIVE AIRWAY DISEASE 6/19/2019                                  Respiratory meds:  DENIES    FAMILY PRESENT:             NO     PAST SLEEP STUDY:                         DENIES  HX OF GURINDER:                                        DENIES  GURINDER assessment:     DANGERS OF UNTREATED GURINDER EXPLAINED TO PT.                                          SLEEPS ON SIDE       &         STOMACH  PHYSICAL EXAM   Body mass index is 39.39 kg/m².   Vitals:    03/18/25 1000   BP:    Pulse: (P) 59   Resp:    Temp:    SpO2: (P) 95%     Neck circumference:   36   cm    Loud snoring:                                                 YES             Witnessed apnea or wakening gasping or choking:        DENIES        Awakens with headaches:                                               DENIES  Morning or daytime tiredness/ sleepiness:

## 2025-03-18 NOTE — CARE COORDINATION
Joint Camp Case Management note:  Patient screened in Prehab for discharge planning needs. Patient scheduled for a future total joint replacement.  We discussed Home Health and equipment needed after surgery. List of Home Health providers offered.  Pt requesting [facility] -message left. Pt has a walker.

## 2025-04-03 ENCOUNTER — OFFICE VISIT (OUTPATIENT)
Dept: ORTHOPEDIC SURGERY | Age: 80
End: 2025-04-03

## 2025-04-03 DIAGNOSIS — M17.12 PRIMARY OSTEOARTHRITIS OF LEFT KNEE: Primary | ICD-10-CM

## 2025-04-03 PROCEDURE — PREOPEXAM PRE-OP EXAM: Performed by: PHYSICIAN ASSISTANT

## 2025-04-03 RX ORDER — METHOCARBAMOL 750 MG/1
750 TABLET, FILM COATED ORAL 3 TIMES DAILY PRN
Qty: 40 TABLET | Refills: 1 | Status: SHIPPED | OUTPATIENT
Start: 2025-04-07

## 2025-04-03 RX ORDER — PROMETHAZINE HYDROCHLORIDE 12.5 MG/1
12.5 TABLET ORAL 4 TIMES DAILY PRN
Qty: 20 TABLET | Refills: 2 | Status: SHIPPED | OUTPATIENT
Start: 2025-04-07

## 2025-04-03 RX ORDER — OXYCODONE HYDROCHLORIDE 5 MG/1
5-10 TABLET ORAL
Qty: 60 TABLET | Refills: 0 | Status: SHIPPED | OUTPATIENT
Start: 2025-04-07 | End: 2025-04-12

## 2025-04-03 RX ORDER — CELECOXIB 200 MG/1
200 CAPSULE ORAL 2 TIMES DAILY
Qty: 60 CAPSULE | Refills: 0 | Status: SHIPPED | OUTPATIENT
Start: 2025-04-07 | End: 2025-05-07

## 2025-04-03 RX ORDER — ASPIRIN 81 MG/1
81 TABLET ORAL 2 TIMES DAILY
Qty: 70 TABLET | Refills: 0 | Status: SHIPPED | OUTPATIENT
Start: 2025-04-07 | End: 2025-05-12

## 2025-04-03 NOTE — H&P (VIEW-ONLY)
% 03/18/2025 0.2  0.0 - 5.0 % Final    Neutrophils Absolute 03/18/2025 2.33  1.70 - 8.20 K/UL Final    Lymphocytes Absolute 03/18/2025 1.78  0.50 - 4.60 K/UL Final    Monocytes Absolute 03/18/2025 0.49  0.10 - 1.30 K/UL Final    Eosinophils Absolute 03/18/2025 0.18  0.00 - 0.80 K/UL Final    Basophils Absolute 03/18/2025 0.05  0.00 - 0.20 K/UL Final    Immature Granulocytes Absolute 03/18/2025 0.01  0.0 - 0.5 K/UL Final    MRSA by PCR 03/18/2025 Not detected  NOTD   Final    Comment: A positive test result does not necessarily indicate the presence of a viable organism. A positive result is indicative of the presence of SA or MRSA DNA.  The BD Max StaphSR assay is intended to aid in the prevention and control of MRSA and SA infections in healthcare settings.  It is not intended to diagnose MRSA or SA infections nor guide or monitor treatment for MRSA/SA infections. A negative result does not preclude nasal colonization.      SA by PCR 03/18/2025 Not detected  NOTD   Final    Ventricular Rate 03/18/2025 56  BPM Final    Atrial Rate 03/18/2025 56  BPM Final    P-R Interval 03/18/2025 166  ms Final    QRS Duration 03/18/2025 92  ms Final    Q-T Interval 03/18/2025 454  ms Final    QTc Calculation (Bazett) 03/18/2025 438  ms Final    P Axis 03/18/2025 25  degrees Final    R Axis 03/18/2025 -8  degrees Final    T Axis 03/18/2025 19  degrees Final    Diagnosis 03/18/2025    Final                    Value:Sinus bradycardia  Otherwise normal ECG  When compared with ECG of 19-FEB-2019 10:44,  No significant change was found  Confirmed by MD KAY (), MIGUEL ([id number]) on 3/18/2025 11:43:36 AM     Office Visit on 03/05/2025   Component Date Value Ref Range Status    Vit D, 25-Hydroxy 03/05/2025 43.4  30.0 - 100.0 ng/mL Final    Comment: Deficiency         <20.0 ng/mL  Insufficiency       20.0-29.9 ng/mL      Albumin Urine 03/05/2025 1.43  0.00 - 20.00 MG/DL Final    Creatinine, Ur 03/05/2025 231.00 (H)  28.00 - 217.00 mg/dL

## 2025-04-03 NOTE — PROGRESS NOTES
[facility]  Pre Operative History and Physical Exam    Patient ID:  Jenn Gomez  [id number]  79 y.o.  1945    Today: April 3, 2025           CC: Left knee pain    HPI:   The patient has end stage arthritis of the left knee. The patient was evaluated and examined during a consultation prior to this office visit.  There have been no changes to the patient's orthopedic condition since the initial consultation. The patient has failed previous conservative treatment for this condition including antiinflammatories , and lifestyle modifications. The necessity for joint replacement is present. The patient will be admitted the day of surgery for left knee replacement    Past Medical/Surgical History:  Past Medical History:   Diagnosis Date    Anxiety     Arthritis     OA    BMI 39.0-39.9,adult     Depression     Diabetes mellitus (HCC)     type 2 per PCP note;  no meds; reports does not check BS; A1c-5.2 on 3/18/25    Hypercholesterolemia     on med for control    Hypertension     on med for control    Morbid obesity     Personal history of colonic polyps     Stage 3 chronic kidney disease (HCC)     per PCP notes    Status post total knee replacement, right 3/6/2019    Vitamin D deficiency      Past Surgical History:   Procedure Laterality Date    APPENDECTOMY      BREAST BIOPSY Left     ?  (Stereo Bx - negative per pt)     SECTION      CHOLECYSTECTOMY, LAPAROSCOPIC      COLONOSCOPY  2012    polyp removed, given 3-4 year recall    CYST REMOVAL      neck    HYSTERECTOMY (CERVIX STATUS UNKNOWN)      TONSILLECTOMY      TOTAL KNEE ARTHROPLASTY Right 2019        Allergies: No Known Allergies     Physical Exam:   General: NAD, Alert, Oriented, Appears their stated age     HEENT: NC/AT    Skin: No rashes, lesions or wounds seen      Psych: normal affect      Heart: Regular Rate, Rhythm     Lungs: unlabored respirations, no wheezing    Abdomen: Soft and non-distended     Ortho:

## 2025-04-08 ENCOUNTER — ANESTHESIA EVENT (OUTPATIENT)
Dept: SURGERY | Age: 80
End: 2025-04-08
Payer: MEDICARE

## 2025-04-08 NOTE — DISCHARGE INSTRUCTIONS
[facility]   Patient Discharge Instructions    Jenn Calvin Jason / [id number] : 1945    Admitted (Not on file) Discharged: 2025     IF YOU HAVE ANY PROBLEMS ONCE YOU ARE AT HOME CALL THE FOLLOWING NUMBERS:   Nurse's line: [phone]  Main office number: [phone]      Medications    The medications you are to continue on are listed on the medication reconciliation sheet.   Narcotic pain medications as well as supplemental iron can cause constipation. If this occurs, try over the counter stool softeners or try stopping the narcotic pain medication and/or the iron.   It is important that you take the medication exactly as they are prescribed.  Medications which increase your risk of blood clots are listed to stop for 5 weeks after surgery as well as medications or supplements which increase your risk of bleeding complications.   Keep your medication in the bottles provided by the pharmacist and keep a list of the medication names, dosages, and times to be taken in your wallet.   Do not take other medications without consulting your doctor.  If you need a refill on your pain medication, please note our office is closed over the weekend. It is our office policy that on-call providers cannot refill narcotic pain medications over the weekend. If you will need a refill over the weekend, please call our office before 12pm on Friday or first thing Monday morning.        Important Information    Do NOT smoke as this will greatly increase your risk of infection!    Resume your prehospital diet. If you have excessive nausea or vomitting call your doctor's office     Leg swelling and warmth is normal for 6 months after surgery. If you experience swelling in your leg, elevate your leg while laying down with your toes above your heart. If you have sudden onset severe swelling with leg pain call our office. Use Roland Hose stockings until we see you in the office for your follow up

## 2025-04-09 ENCOUNTER — HOSPITAL ENCOUNTER (OUTPATIENT)
Age: 80
Discharge: HOME HEALTH | End: 2025-04-10
Attending: ORTHOPAEDIC SURGERY | Admitting: ORTHOPAEDIC SURGERY
Payer: MEDICARE

## 2025-04-09 ENCOUNTER — ANESTHESIA (OUTPATIENT)
Dept: SURGERY | Age: 80
End: 2025-04-09
Payer: MEDICARE

## 2025-04-09 PROBLEM — M17.12 PRIMARY OSTEOARTHRITIS OF LEFT KNEE: Status: ACTIVE | Noted: 2025-04-09

## 2025-04-09 PROBLEM — Z96.652 STATUS POST LEFT KNEE REPLACEMENT: Status: ACTIVE | Noted: 2025-04-09

## 2025-04-09 PROCEDURE — C1713 ANCHOR/SCREW BN/BN,TIS/BN: HCPCS | Performed by: ORTHOPAEDIC SURGERY

## 2025-04-09 PROCEDURE — 6360000002 HC RX W HCPCS: Performed by: NURSE ANESTHETIST, CERTIFIED REGISTERED

## 2025-04-09 PROCEDURE — 6360000002 HC RX W HCPCS: Performed by: SURGERY

## 2025-04-09 PROCEDURE — 6360000002 HC RX W HCPCS: Performed by: PHYSICIAN ASSISTANT

## 2025-04-09 PROCEDURE — 3700000001 HC ADD 15 MINUTES (ANESTHESIA): Performed by: ORTHOPAEDIC SURGERY

## 2025-04-09 PROCEDURE — 2709999900 HC NON-CHARGEABLE SUPPLY: Performed by: ORTHOPAEDIC SURGERY

## 2025-04-09 PROCEDURE — 2500000003 HC RX 250 WO HCPCS: Performed by: NURSE ANESTHETIST, CERTIFIED REGISTERED

## 2025-04-09 PROCEDURE — 97161 PT EVAL LOW COMPLEX 20 MIN: CPT

## 2025-04-09 PROCEDURE — 7100000000 HC PACU RECOVERY - FIRST 15 MIN: Performed by: ORTHOPAEDIC SURGERY

## 2025-04-09 PROCEDURE — 6370000000 HC RX 637 (ALT 250 FOR IP): Performed by: SURGERY

## 2025-04-09 PROCEDURE — 7100000001 HC PACU RECOVERY - ADDTL 15 MIN: Performed by: ORTHOPAEDIC SURGERY

## 2025-04-09 PROCEDURE — 2500000003 HC RX 250 WO HCPCS: Performed by: PHYSICIAN ASSISTANT

## 2025-04-09 PROCEDURE — 6370000000 HC RX 637 (ALT 250 FOR IP): Performed by: PHYSICIAN ASSISTANT

## 2025-04-09 PROCEDURE — 2580000003 HC RX 258: Performed by: NURSE ANESTHETIST, CERTIFIED REGISTERED

## 2025-04-09 PROCEDURE — 3600000015 HC SURGERY LEVEL 5 ADDTL 15MIN: Performed by: ORTHOPAEDIC SURGERY

## 2025-04-09 PROCEDURE — 97535 SELF CARE MNGMENT TRAINING: CPT

## 2025-04-09 PROCEDURE — 2580000003 HC RX 258: Performed by: SURGERY

## 2025-04-09 PROCEDURE — 97530 THERAPEUTIC ACTIVITIES: CPT

## 2025-04-09 PROCEDURE — 94761 N-INVAS EAR/PLS OXIMETRY MLT: CPT

## 2025-04-09 PROCEDURE — 97165 OT EVAL LOW COMPLEX 30 MIN: CPT

## 2025-04-09 PROCEDURE — C1776 JOINT DEVICE (IMPLANTABLE): HCPCS | Performed by: ORTHOPAEDIC SURGERY

## 2025-04-09 PROCEDURE — 6360000002 HC RX W HCPCS: Performed by: ORTHOPAEDIC SURGERY

## 2025-04-09 PROCEDURE — 94760 N-INVAS EAR/PLS OXIMETRY 1: CPT

## 2025-04-09 PROCEDURE — 2700000000 HC OXYGEN THERAPY PER DAY

## 2025-04-09 PROCEDURE — 3700000000 HC ANESTHESIA ATTENDED CARE: Performed by: ORTHOPAEDIC SURGERY

## 2025-04-09 PROCEDURE — 2720000010 HC SURG SUPPLY STERILE: Performed by: ORTHOPAEDIC SURGERY

## 2025-04-09 PROCEDURE — 64447 NJX AA&/STRD FEMORAL NRV IMG: CPT | Performed by: SURGERY

## 2025-04-09 PROCEDURE — 3600000005 HC SURGERY LEVEL 5 BASE: Performed by: ORTHOPAEDIC SURGERY

## 2025-04-09 DEVICE — DEPUY CMW 2 FAST SET BONE CEMENT 20G: Type: IMPLANTABLE DEVICE | Site: PATELLA | Status: FUNCTIONAL

## 2025-04-09 DEVICE — TIBIAL BEARING INSERT - CS
Type: IMPLANTABLE DEVICE | Site: KNEE | Status: FUNCTIONAL
Brand: TRIATHLON

## 2025-04-09 DEVICE — TIBIAL COMPONENT
Type: IMPLANTABLE DEVICE | Site: KNEE | Status: FUNCTIONAL
Brand: TRIATHLON

## 2025-04-09 DEVICE — CRUCIATE RETAINING FEMORAL
Type: IMPLANTABLE DEVICE | Site: KNEE | Status: FUNCTIONAL
Brand: TRIATHLON

## 2025-04-09 DEVICE — ASYMMETRIC PATELLA
Type: IMPLANTABLE DEVICE | Site: PATELLA | Status: FUNCTIONAL
Brand: TRIATHLON

## 2025-04-09 DEVICE — COMPONENT TOT KNEE CAPPED PRIMARY K2STRYKER] STRYKER CORP]: Type: IMPLANTABLE DEVICE | Status: FUNCTIONAL

## 2025-04-09 RX ORDER — SODIUM CHLORIDE 0.9 % (FLUSH) 0.9 %
5-40 SYRINGE (ML) INJECTION EVERY 12 HOURS SCHEDULED
Status: DISCONTINUED | OUTPATIENT
Start: 2025-04-09 | End: 2025-04-09 | Stop reason: HOSPADM

## 2025-04-09 RX ORDER — ROPIVACAINE HYDROCHLORIDE 2 MG/ML
INJECTION, SOLUTION EPIDURAL; INFILTRATION; PERINEURAL PRN
Status: DISCONTINUED | OUTPATIENT
Start: 2025-04-09 | End: 2025-04-09 | Stop reason: HOSPADM

## 2025-04-09 RX ORDER — HYDROMORPHONE HYDROCHLORIDE 1 MG/ML
1 INJECTION, SOLUTION INTRAMUSCULAR; INTRAVENOUS; SUBCUTANEOUS
Status: DISCONTINUED | OUTPATIENT
Start: 2025-04-09 | End: 2025-04-10 | Stop reason: HOSPADM

## 2025-04-09 RX ORDER — SODIUM CHLORIDE, SODIUM LACTATE, POTASSIUM CHLORIDE, CALCIUM CHLORIDE 600; 310; 30; 20 MG/100ML; MG/100ML; MG/100ML; MG/100ML
INJECTION, SOLUTION INTRAVENOUS
Status: DISCONTINUED | OUTPATIENT
Start: 2025-04-09 | End: 2025-04-09 | Stop reason: SDUPTHER

## 2025-04-09 RX ORDER — ONDANSETRON 2 MG/ML
4 INJECTION INTRAMUSCULAR; INTRAVENOUS EVERY 6 HOURS PRN
Status: DISCONTINUED | OUTPATIENT
Start: 2025-04-09 | End: 2025-04-10 | Stop reason: HOSPADM

## 2025-04-09 RX ORDER — LABETALOL HYDROCHLORIDE 5 MG/ML
10 INJECTION, SOLUTION INTRAVENOUS
Status: DISCONTINUED | OUTPATIENT
Start: 2025-04-09 | End: 2025-04-09 | Stop reason: HOSPADM

## 2025-04-09 RX ORDER — ACETAMINOPHEN 500 MG
1000 TABLET ORAL ONCE
Status: DISCONTINUED | OUTPATIENT
Start: 2025-04-09 | End: 2025-04-09 | Stop reason: SDUPTHER

## 2025-04-09 RX ORDER — METHOCARBAMOL 750 MG/1
750 TABLET, FILM COATED ORAL EVERY 8 HOURS PRN
Status: DISCONTINUED | OUTPATIENT
Start: 2025-04-09 | End: 2025-04-10 | Stop reason: HOSPADM

## 2025-04-09 RX ORDER — HALOPERIDOL 5 MG/ML
1 INJECTION INTRAMUSCULAR
Status: COMPLETED | OUTPATIENT
Start: 2025-04-09 | End: 2025-04-09

## 2025-04-09 RX ORDER — TRANEXAMIC ACID 100 MG/ML
INJECTION, SOLUTION INTRAVENOUS
Status: DISCONTINUED | OUTPATIENT
Start: 2025-04-09 | End: 2025-04-09 | Stop reason: SDUPTHER

## 2025-04-09 RX ORDER — SODIUM CHLORIDE 0.9 % (FLUSH) 0.9 %
5-40 SYRINGE (ML) INJECTION EVERY 12 HOURS SCHEDULED
Status: DISCONTINUED | OUTPATIENT
Start: 2025-04-09 | End: 2025-04-10 | Stop reason: HOSPADM

## 2025-04-09 RX ORDER — ACETAMINOPHEN 500 MG
1000 TABLET ORAL ONCE
Status: COMPLETED | OUTPATIENT
Start: 2025-04-09 | End: 2025-04-09

## 2025-04-09 RX ORDER — LIDOCAINE HYDROCHLORIDE 10 MG/ML
1 INJECTION, SOLUTION INFILTRATION; PERINEURAL
Status: DISCONTINUED | OUTPATIENT
Start: 2025-04-09 | End: 2025-04-09 | Stop reason: HOSPADM

## 2025-04-09 RX ORDER — DIPHENHYDRAMINE HYDROCHLORIDE 50 MG/ML
25 INJECTION, SOLUTION INTRAMUSCULAR; INTRAVENOUS EVERY 6 HOURS PRN
Status: DISCONTINUED | OUTPATIENT
Start: 2025-04-09 | End: 2025-04-10 | Stop reason: HOSPADM

## 2025-04-09 RX ORDER — ONDANSETRON 4 MG/1
4 TABLET, ORALLY DISINTEGRATING ORAL EVERY 8 HOURS PRN
Status: DISCONTINUED | OUTPATIENT
Start: 2025-04-09 | End: 2025-04-10 | Stop reason: HOSPADM

## 2025-04-09 RX ORDER — EPHEDRINE SULFATE 5 MG/ML
INJECTION INTRAVENOUS
Status: DISCONTINUED | OUTPATIENT
Start: 2025-04-09 | End: 2025-04-09 | Stop reason: SDUPTHER

## 2025-04-09 RX ORDER — SODIUM CHLORIDE 0.9 % (FLUSH) 0.9 %
5-40 SYRINGE (ML) INJECTION EVERY 12 HOURS SCHEDULED
Status: DISCONTINUED | OUTPATIENT
Start: 2025-04-09 | End: 2025-04-09 | Stop reason: SDUPTHER

## 2025-04-09 RX ORDER — ACETAMINOPHEN 500 MG
1000 TABLET ORAL EVERY 6 HOURS PRN
COMMUNITY

## 2025-04-09 RX ORDER — SODIUM CHLORIDE 0.9 % (FLUSH) 0.9 %
5-40 SYRINGE (ML) INJECTION PRN
Status: DISCONTINUED | OUTPATIENT
Start: 2025-04-09 | End: 2025-04-10 | Stop reason: HOSPADM

## 2025-04-09 RX ORDER — SODIUM CHLORIDE, SODIUM LACTATE, POTASSIUM CHLORIDE, CALCIUM CHLORIDE 600; 310; 30; 20 MG/100ML; MG/100ML; MG/100ML; MG/100ML
INJECTION, SOLUTION INTRAVENOUS CONTINUOUS
Status: DISCONTINUED | OUTPATIENT
Start: 2025-04-09 | End: 2025-04-09 | Stop reason: HOSPADM

## 2025-04-09 RX ORDER — SODIUM CHLORIDE 0.9 % (FLUSH) 0.9 %
5-40 SYRINGE (ML) INJECTION PRN
Status: DISCONTINUED | OUTPATIENT
Start: 2025-04-09 | End: 2025-04-09 | Stop reason: SDUPTHER

## 2025-04-09 RX ORDER — FENTANYL CITRATE 50 UG/ML
INJECTION, SOLUTION INTRAMUSCULAR; INTRAVENOUS
Status: DISCONTINUED | OUTPATIENT
Start: 2025-04-09 | End: 2025-04-09 | Stop reason: SDUPTHER

## 2025-04-09 RX ORDER — ASPIRIN 81 MG/1
81 TABLET ORAL 2 TIMES DAILY
Status: DISCONTINUED | OUTPATIENT
Start: 2025-04-09 | End: 2025-04-10 | Stop reason: HOSPADM

## 2025-04-09 RX ORDER — SENNA AND DOCUSATE SODIUM 50; 8.6 MG/1; MG/1
1 TABLET, FILM COATED ORAL 2 TIMES DAILY
Status: DISCONTINUED | OUTPATIENT
Start: 2025-04-09 | End: 2025-04-10 | Stop reason: HOSPADM

## 2025-04-09 RX ORDER — KETAMINE HCL IN NACL, ISO-OSM 20 MG/2 ML
SYRINGE (ML) INJECTION
Status: DISCONTINUED | OUTPATIENT
Start: 2025-04-09 | End: 2025-04-09 | Stop reason: SDUPTHER

## 2025-04-09 RX ORDER — PROPOFOL 10 MG/ML
INJECTION, EMULSION INTRAVENOUS
Status: DISCONTINUED | OUTPATIENT
Start: 2025-04-09 | End: 2025-04-09 | Stop reason: SDUPTHER

## 2025-04-09 RX ORDER — NALOXONE HYDROCHLORIDE 0.4 MG/ML
INJECTION, SOLUTION INTRAMUSCULAR; INTRAVENOUS; SUBCUTANEOUS PRN
Status: DISCONTINUED | OUTPATIENT
Start: 2025-04-09 | End: 2025-04-09 | Stop reason: HOSPADM

## 2025-04-09 RX ORDER — SODIUM CHLORIDE 9 MG/ML
INJECTION, SOLUTION INTRAVENOUS PRN
Status: DISCONTINUED | OUTPATIENT
Start: 2025-04-09 | End: 2025-04-09 | Stop reason: HOSPADM

## 2025-04-09 RX ORDER — OXYCODONE HYDROCHLORIDE 5 MG/1
5 TABLET ORAL EVERY 4 HOURS PRN
Status: DISCONTINUED | OUTPATIENT
Start: 2025-04-09 | End: 2025-04-10 | Stop reason: HOSPADM

## 2025-04-09 RX ORDER — SODIUM CHLORIDE 0.9 % (FLUSH) 0.9 %
5-40 SYRINGE (ML) INJECTION PRN
Status: DISCONTINUED | OUTPATIENT
Start: 2025-04-09 | End: 2025-04-09 | Stop reason: HOSPADM

## 2025-04-09 RX ORDER — MIDAZOLAM HYDROCHLORIDE 2 MG/2ML
2 INJECTION, SOLUTION INTRAMUSCULAR; INTRAVENOUS
Status: COMPLETED | OUTPATIENT
Start: 2025-04-09 | End: 2025-04-09

## 2025-04-09 RX ORDER — FENTANYL CITRATE 50 UG/ML
100 INJECTION, SOLUTION INTRAMUSCULAR; INTRAVENOUS
Status: COMPLETED | OUTPATIENT
Start: 2025-04-09 | End: 2025-04-09

## 2025-04-09 RX ORDER — KETOROLAC TROMETHAMINE 30 MG/ML
INJECTION, SOLUTION INTRAMUSCULAR; INTRAVENOUS PRN
Status: DISCONTINUED | OUTPATIENT
Start: 2025-04-09 | End: 2025-04-09 | Stop reason: HOSPADM

## 2025-04-09 RX ORDER — DIPHENHYDRAMINE HCL 25 MG
25 CAPSULE ORAL EVERY 6 HOURS PRN
Status: DISCONTINUED | OUTPATIENT
Start: 2025-04-09 | End: 2025-04-10 | Stop reason: HOSPADM

## 2025-04-09 RX ORDER — SODIUM CHLORIDE 9 MG/ML
INJECTION, SOLUTION INTRAVENOUS PRN
Status: DISCONTINUED | OUTPATIENT
Start: 2025-04-09 | End: 2025-04-10 | Stop reason: HOSPADM

## 2025-04-09 RX ORDER — SODIUM CHLORIDE 9 MG/ML
INJECTION, SOLUTION INTRAVENOUS PRN
Status: DISCONTINUED | OUTPATIENT
Start: 2025-04-09 | End: 2025-04-09 | Stop reason: SDUPTHER

## 2025-04-09 RX ORDER — OXYCODONE HYDROCHLORIDE 5 MG/1
5 TABLET ORAL
Status: DISCONTINUED | OUTPATIENT
Start: 2025-04-09 | End: 2025-04-09 | Stop reason: HOSPADM

## 2025-04-09 RX ORDER — VANCOMYCIN HYDROCHLORIDE 1 G/20ML
INJECTION, POWDER, LYOPHILIZED, FOR SOLUTION INTRAVENOUS PRN
Status: DISCONTINUED | OUTPATIENT
Start: 2025-04-09 | End: 2025-04-09 | Stop reason: HOSPADM

## 2025-04-09 RX ORDER — DEXAMETHASONE SODIUM PHOSPHATE 10 MG/ML
INJECTION, SOLUTION INTRAMUSCULAR; INTRAVENOUS
Status: COMPLETED | OUTPATIENT
Start: 2025-04-09 | End: 2025-04-09

## 2025-04-09 RX ORDER — ROPIVACAINE HYDROCHLORIDE 2 MG/ML
INJECTION, SOLUTION EPIDURAL; INFILTRATION; PERINEURAL
Status: COMPLETED | OUTPATIENT
Start: 2025-04-09 | End: 2025-04-09

## 2025-04-09 RX ORDER — LIDOCAINE HYDROCHLORIDE 20 MG/ML
INJECTION, SOLUTION EPIDURAL; INFILTRATION; INTRACAUDAL; PERINEURAL
Status: DISCONTINUED | OUTPATIENT
Start: 2025-04-09 | End: 2025-04-09 | Stop reason: SDUPTHER

## 2025-04-09 RX ORDER — GLYCOPYRROLATE 0.2 MG/ML
INJECTION INTRAMUSCULAR; INTRAVENOUS
Status: DISCONTINUED | OUTPATIENT
Start: 2025-04-09 | End: 2025-04-09 | Stop reason: SDUPTHER

## 2025-04-09 RX ORDER — ACETAMINOPHEN 500 MG
1000 TABLET ORAL EVERY 6 HOURS
Status: DISCONTINUED | OUTPATIENT
Start: 2025-04-09 | End: 2025-04-10 | Stop reason: HOSPADM

## 2025-04-09 RX ORDER — OXYCODONE HYDROCHLORIDE 5 MG/1
10 TABLET ORAL EVERY 4 HOURS PRN
Status: DISCONTINUED | OUTPATIENT
Start: 2025-04-09 | End: 2025-04-10 | Stop reason: HOSPADM

## 2025-04-09 RX ADMIN — TRANEXAMIC ACID 1000 MG: 100 INJECTION, SOLUTION INTRAVENOUS at 11:39

## 2025-04-09 RX ADMIN — SODIUM CHLORIDE, POTASSIUM CHLORIDE, SODIUM LACTATE AND CALCIUM CHLORIDE: 600; 310; 30; 20 INJECTION, SOLUTION INTRAVENOUS at 08:08

## 2025-04-09 RX ADMIN — DEXAMETHASONE SODIUM PHOSPHATE 4 MG: 10 INJECTION, SOLUTION INTRAMUSCULAR; INTRAVENOUS at 09:30

## 2025-04-09 RX ADMIN — FENTANYL CITRATE 100 MCG: 50 INJECTION, SOLUTION INTRAMUSCULAR; INTRAVENOUS at 12:18

## 2025-04-09 RX ADMIN — OXYCODONE 10 MG: 5 TABLET ORAL at 14:32

## 2025-04-09 RX ADMIN — PROPOFOL 50 MG: 10 INJECTION, EMULSION INTRAVENOUS at 10:23

## 2025-04-09 RX ADMIN — HALOPERIDOL LACTATE 1 MG: 5 INJECTION, SOLUTION INTRAMUSCULAR at 12:49

## 2025-04-09 RX ADMIN — PROPOFOL 50 MG: 10 INJECTION, EMULSION INTRAVENOUS at 11:04

## 2025-04-09 RX ADMIN — PHENYLEPHRINE HYDROCHLORIDE 100 MCG: 0.1 INJECTION, SOLUTION INTRAVENOUS at 11:39

## 2025-04-09 RX ADMIN — MIDAZOLAM HYDROCHLORIDE 1 MG: 1 INJECTION, SOLUTION INTRAMUSCULAR; INTRAVENOUS at 09:28

## 2025-04-09 RX ADMIN — PHENYLEPHRINE HYDROCHLORIDE 100 MCG: 0.1 INJECTION, SOLUTION INTRAVENOUS at 10:56

## 2025-04-09 RX ADMIN — SODIUM CHLORIDE, SODIUM LACTATE, POTASSIUM CHLORIDE, AND CALCIUM CHLORIDE: 600; 310; 30; 20 INJECTION, SOLUTION INTRAVENOUS at 10:36

## 2025-04-09 RX ADMIN — TRANEXAMIC ACID 1000 MG: 100 INJECTION, SOLUTION INTRAVENOUS at 10:12

## 2025-04-09 RX ADMIN — Medication 20 MG: at 10:23

## 2025-04-09 RX ADMIN — ASPIRIN 81 MG: 81 TABLET, COATED ORAL at 20:36

## 2025-04-09 RX ADMIN — ACETAMINOPHEN 1000 MG: 500 TABLET, FILM COATED ORAL at 08:08

## 2025-04-09 RX ADMIN — LIDOCAINE HYDROCHLORIDE 60 MG: 20 INJECTION, SOLUTION EPIDURAL; INFILTRATION; INTRACAUDAL; PERINEURAL at 10:23

## 2025-04-09 RX ADMIN — PROPOFOL 75 MCG/KG/MIN: 10 INJECTION, EMULSION INTRAVENOUS at 10:24

## 2025-04-09 RX ADMIN — ACETAMINOPHEN 1000 MG: 500 TABLET, FILM COATED ORAL at 17:41

## 2025-04-09 RX ADMIN — CEFAZOLIN 2000 MG: 10 INJECTION, POWDER, FOR SOLUTION INTRAVENOUS at 17:41

## 2025-04-09 RX ADMIN — HYDROMORPHONE HYDROCHLORIDE 0.5 MG: 1 INJECTION, SOLUTION INTRAMUSCULAR; INTRAVENOUS; SUBCUTANEOUS at 12:39

## 2025-04-09 RX ADMIN — EPHEDRINE SULFATE 10 MG: 5 INJECTION INTRAVENOUS at 11:32

## 2025-04-09 RX ADMIN — MEPIVACAINE HYDROCHLORIDE 55 MG: 20 INJECTION, SOLUTION EPIDURAL; INFILTRATION at 10:19

## 2025-04-09 RX ADMIN — GLYCOPYRROLATE 0.2 MG: 0.2 INJECTION INTRAMUSCULAR; INTRAVENOUS at 10:19

## 2025-04-09 RX ADMIN — HYDROMORPHONE HYDROCHLORIDE 0.5 MG: 1 INJECTION, SOLUTION INTRAMUSCULAR; INTRAVENOUS; SUBCUTANEOUS at 12:45

## 2025-04-09 RX ADMIN — SENNOSIDES, DOCUSATE SODIUM 1 TABLET: 50; 8.6 TABLET, FILM COATED ORAL at 20:36

## 2025-04-09 RX ADMIN — ROPIVACAINE HYDROCHLORIDE 20 ML: 2 INJECTION, SOLUTION EPIDURAL; INFILTRATION at 09:30

## 2025-04-09 RX ADMIN — DIPHENHYDRAMINE HYDROCHLORIDE 25 MG: 25 CAPSULE ORAL at 23:53

## 2025-04-09 RX ADMIN — FENTANYL CITRATE 50 MCG: 50 INJECTION INTRAMUSCULAR; INTRAVENOUS at 09:28

## 2025-04-09 RX ADMIN — PHENYLEPHRINE HYDROCHLORIDE 100 MCG: 0.1 INJECTION, SOLUTION INTRAVENOUS at 10:39

## 2025-04-09 RX ADMIN — PHENYLEPHRINE HYDROCHLORIDE 100 MCG: 0.1 INJECTION, SOLUTION INTRAVENOUS at 11:16

## 2025-04-09 RX ADMIN — Medication 2000 MG: at 10:12

## 2025-04-09 RX ADMIN — SODIUM CHLORIDE, SODIUM LACTATE, POTASSIUM CHLORIDE, AND CALCIUM CHLORIDE: 600; 310; 30; 20 INJECTION, SOLUTION INTRAVENOUS at 09:58

## 2025-04-09 RX ADMIN — EPHEDRINE SULFATE 10 MG: 5 INJECTION INTRAVENOUS at 11:37

## 2025-04-09 RX ADMIN — ACETAMINOPHEN 1000 MG: 500 TABLET, FILM COATED ORAL at 23:52

## 2025-04-09 ASSESSMENT — PAIN DESCRIPTION - LOCATION
LOCATION: KNEE

## 2025-04-09 ASSESSMENT — PAIN SCALES - GENERAL
PAINLEVEL_OUTOF10: 8
PAINLEVEL_OUTOF10: 4
PAINLEVEL_OUTOF10: 7
PAINLEVEL_OUTOF10: 8

## 2025-04-09 ASSESSMENT — PAIN DESCRIPTION - ORIENTATION
ORIENTATION: LEFT

## 2025-04-09 ASSESSMENT — PAIN DESCRIPTION - DESCRIPTORS
DESCRIPTORS: DISCOMFORT;BURNING
DESCRIPTORS: BURNING
DESCRIPTORS: BURNING
DESCRIPTORS: ACHING

## 2025-04-09 ASSESSMENT — KOOS JR
KOOS JR TOTAL INTERVAL SCORE: 52.465
BENDING TO THE FLOOR TO PICK UP OBJECT: MODERATE
TWISING OR PIVOTING ON KNEE: MODERATE
STANDING UPRIGHT: MODERATE
GOING UP OR DOWN STAIRS: MODERATE
RISING FROM SITTING: MODERATE
STRAIGHTENING KNEE FULLY: MODERATE
HOW SEVERE IS YOUR KNEE STIFFNESS AFTER FIRST WAKING IN MORNING: MODERATE

## 2025-04-09 ASSESSMENT — PAIN DESCRIPTION - ONSET
ONSET: ON-GOING
ONSET: ON-GOING

## 2025-04-09 ASSESSMENT — PAIN DESCRIPTION - PAIN TYPE
TYPE: SURGICAL PAIN
TYPE: SURGICAL PAIN

## 2025-04-09 ASSESSMENT — PAIN DESCRIPTION - FREQUENCY
FREQUENCY: CONTINUOUS
FREQUENCY: CONTINUOUS

## 2025-04-09 ASSESSMENT — PAIN - FUNCTIONAL ASSESSMENT
PAIN_FUNCTIONAL_ASSESSMENT: ACTIVITIES ARE NOT PREVENTED
PAIN_FUNCTIONAL_ASSESSMENT: 0-10

## 2025-04-09 NOTE — CARE COORDINATION
Patient is a 79 y.o. year old female admitted for Left TKA . Patient plans to return home on discharge. Order received to arrange home health. [facility]. Referral sent to [facility] and accepted.   Patient denies any equipment needs as patient has a walker. Will follow until discharge.      04/09/25 1453   Service Assessment   Patient Orientation Alert and Oriented   Cognition Alert   History Provided By Patient   Primary Caregiver Self   Services At/After Discharge   Transition of Care Consult (CM Consult) Home Health   Internal Home Health No   Reason Outside Agency Chosen Script used patient chose alternate agency   Services At/After Discharge Home Health;PT   Mode of Transport at Discharge Self   Confirm Follow Up Transport Self   Condition of Participation: Discharge Planning   The Plan for Transition of Care is related to the following treatment goals: improve mobility   The Patient and/or Patient Representative was provided with a Choice of Provider? Patient   The Patient and/Or Patient Representative agree with the Discharge Plan? Yes   Freedom of Choice list was provided with basic dialogue that supports the patient's individualized plan of care/goals, treatment preferences, and shares the quality data associated with the providers?  Yes

## 2025-04-09 NOTE — ANESTHESIA PROCEDURE NOTES
Expiration Date (Month Year): 5-2025
Spinal Block    Patient location during procedure: OR  End time: 4/9/2025 10:19 AM  Reason for block: primary anesthetic  Staffing  Performed: anesthesiologist   Anesthesiologist: Cortez Lopez MD  Performed by: Cortez Lopez MD  Authorized by: Cortez Lopez MD    Spinal Block  Patient position: sitting  Prep: ChloraPrep  Patient monitoring: cardiac monitor, continuous pulse ox and frequent blood pressure checks  Approach: midline  Location: L3/L4  Provider prep: mask and sterile gloves  Local infiltration: lidocaine  Needle  Needle type: pencil-tip   Needle gauge: 25 G  Needle length: 3.5 in  Assessment  Events: SAB placement uncomplicated.  No paresthesia.  Swirl obtained: Yes  CSF: clear  Attempts: 1  Hemodynamics: stable  Preanesthetic Checklist  Completed: patient identified, IV checked, risks and benefits discussed, equipment checked, pre-op evaluation, timeout performed, anesthesia consent given, oxygen available and monitors applied/VS acknowledged          
Other   4 mg - 4/9/2025 9:30:00 AM  ropivacaine (NAROPIN) injection 0.2% - Perineural   20 mL - 4/9/2025 9:30:00 AM

## 2025-04-09 NOTE — ANESTHESIA PRE PROCEDURE
diabetic chronic kidney disease (E11.22) E11.22    Body mass index [BMI] 38.0-38.9, adult (Z68.38) Z68.38       Past Medical History:        Diagnosis Date    Anxiety     Arthritis     OA    BMI 39.0-39.9,adult     Depression     Diabetes mellitus (HCC)     type 2 per PCP note;  no meds; reports does not check BS; A1c-5.2 on 3/18/25    Hypercholesterolemia     on med for control    Hypertension     on med for control    Morbid obesity     Personal history of colonic polyps     Stage 3 chronic kidney disease (HCC)     per PCP notes    Status post total knee replacement, right 3/6/2019    Vitamin D deficiency        Past Surgical History:        Procedure Laterality Date    APPENDECTOMY      BREAST BIOPSY Left     ?  (Stereo Bx - negative per pt)     SECTION      CHOLECYSTECTOMY, LAPAROSCOPIC      COLONOSCOPY  2012    polyp removed, given 3-4 year recall    CYST REMOVAL      neck    HYSTERECTOMY (CERVIX STATUS UNKNOWN)      TONSILLECTOMY      TOTAL KNEE ARTHROPLASTY Right 2019       Social History:    Social History     Tobacco Use    Smoking status: Never     Passive exposure: Never    Smokeless tobacco: Never   Substance Use Topics    Alcohol use: No                                Counseling given: Not Answered      Vital Signs (Current): There were no vitals filed for this visit.                                           BP Readings from Last 3 Encounters:   25 (!) 153/70   25 116/64   24 106/62       NPO Status:                                                                                 BMI:   Wt Readings from Last 3 Encounters:   25 88.5 kg (195 lb)   25 88.5 kg (195 lb)   25 87.5 kg (193 lb)     There is no height or weight on file to calculate BMI.    CBC:   Lab Results   Component Value Date/Time    WBC 4.8 2025 09:06 AM    RBC 4.16 2025 09:06 AM    HGB 13.0 2025 09:06 AM    HCT 39.9 2025 09:06 AM    MCV 95.9 2025 09:06

## 2025-04-09 NOTE — INTERVAL H&P NOTE
Update History & Physical    The patient's History and Physical of April 3, 2025 was reviewed with the patient and I examined the patient. There was no change. The surgical site was confirmed by the patient and me.     Plan: The risks, benefits, expected outcome, and alternative to the recommended procedure have been discussed with the patient. Patient understands and wants to proceed with the procedure.     Electronically signed by SWAPNA ASTORGA JR, MD on 4/9/2025 at 8:52 AM

## 2025-04-09 NOTE — ANESTHESIA POSTPROCEDURE EVALUATION
Department of Anesthesiology  Postprocedure Note    Patient: Jenn Gomez  MRN: [id number]  YOB: 1945  Date of evaluation: 4/9/2025    Procedure Summary       Date: 04/09/25 Room / Location: [facility] MAIN OR 08 / [facility] MAIN OR    Anesthesia Start: 0958 Anesthesia Stop: 1238    Procedure: lt KNEE TOTAL ARTHROPLASTY ROBOTIC SDD (Left: Knee) Diagnosis:       Primary osteoarthritis of left knee      (Primary osteoarthritis of left knee [M17.12])    Surgeons: Hardik Coffman Jr., MD Responsible Provider: Cortez Lopez MD    Anesthesia Type: Spinal ASA Status: 3            Anesthesia Type: Spinal    Primitivo Phase I: Primitivo Score: 8    Primitivo Phase II:      Anesthesia Post Evaluation    Patient location during evaluation: PACU  Patient participation: complete - patient participated  Level of consciousness: awake and alert  Airway patency: patent  Nausea & Vomiting: no nausea and no vomiting  Cardiovascular status: hemodynamically stable  Respiratory status: acceptable, nonlabored ventilation and spontaneous ventilation  Hydration status: euvolemic  Comments: BP (!) 142/78   Pulse 76   Temp 98.6 °F (37 °C) (Temporal)   Resp 13   Ht 1.499 m (4' 11\")   Wt 88.1 kg (194 lb 4.8 oz)   SpO2 98%   BMI 39.24 kg/m²     Multimodal analgesia pain management approach  Pain management: adequate and satisfactory to patient    No notable events documented.

## 2025-04-10 VITALS
OXYGEN SATURATION: 94 % | WEIGHT: 194.3 LBS | HEART RATE: 59 BPM | RESPIRATION RATE: 18 BRPM | HEIGHT: 59 IN | TEMPERATURE: 98.2 F | SYSTOLIC BLOOD PRESSURE: 133 MMHG | BODY MASS INDEX: 39.17 KG/M2 | DIASTOLIC BLOOD PRESSURE: 67 MMHG

## 2025-04-10 LAB
HCT VFR BLD AUTO: 34.8 % (ref 35.8–46.3)
HGB BLD-MCNC: 11.5 G/DL (ref 11.7–15.4)

## 2025-04-10 PROCEDURE — 36415 COLL VENOUS BLD VENIPUNCTURE: CPT

## 2025-04-10 PROCEDURE — 97535 SELF CARE MNGMENT TRAINING: CPT

## 2025-04-10 PROCEDURE — 6360000002 HC RX W HCPCS: Performed by: PHYSICIAN ASSISTANT

## 2025-04-10 PROCEDURE — 2500000003 HC RX 250 WO HCPCS: Performed by: PHYSICIAN ASSISTANT

## 2025-04-10 PROCEDURE — 94760 N-INVAS EAR/PLS OXIMETRY 1: CPT

## 2025-04-10 PROCEDURE — 85014 HEMATOCRIT: CPT

## 2025-04-10 PROCEDURE — 97530 THERAPEUTIC ACTIVITIES: CPT

## 2025-04-10 PROCEDURE — 6370000000 HC RX 637 (ALT 250 FOR IP): Performed by: PHYSICIAN ASSISTANT

## 2025-04-10 PROCEDURE — 85018 HEMOGLOBIN: CPT

## 2025-04-10 RX ORDER — LOSARTAN POTASSIUM 50 MG/1
50 TABLET ORAL DAILY
Status: DISCONTINUED | OUTPATIENT
Start: 2025-04-10 | End: 2025-04-10 | Stop reason: HOSPADM

## 2025-04-10 RX ORDER — BUSPIRONE HYDROCHLORIDE 5 MG/1
15 TABLET ORAL 2 TIMES DAILY
Status: DISCONTINUED | OUTPATIENT
Start: 2025-04-10 | End: 2025-04-10 | Stop reason: HOSPADM

## 2025-04-10 RX ORDER — CITALOPRAM HYDROBROMIDE 20 MG/1
20 TABLET ORAL NIGHTLY
Status: DISCONTINUED | OUTPATIENT
Start: 2025-04-10 | End: 2025-04-10 | Stop reason: HOSPADM

## 2025-04-10 RX ORDER — SENNA AND DOCUSATE SODIUM 50; 8.6 MG/1; MG/1
2 TABLET, FILM COATED ORAL 2 TIMES DAILY PRN
Status: DISCONTINUED | OUTPATIENT
Start: 2025-04-10 | End: 2025-04-10 | Stop reason: HOSPADM

## 2025-04-10 RX ORDER — PROMETHAZINE HYDROCHLORIDE 25 MG/ML
25 INJECTION, SOLUTION INTRAMUSCULAR; INTRAVENOUS EVERY 6 HOURS PRN
Status: DISCONTINUED | OUTPATIENT
Start: 2025-04-10 | End: 2025-04-10 | Stop reason: HOSPADM

## 2025-04-10 RX ORDER — PANTOPRAZOLE SODIUM 40 MG/1
40 TABLET, DELAYED RELEASE ORAL DAILY PRN
Status: DISCONTINUED | OUTPATIENT
Start: 2025-04-10 | End: 2025-04-10 | Stop reason: HOSPADM

## 2025-04-10 RX ORDER — BISACODYL 5 MG/1
5 TABLET, DELAYED RELEASE ORAL DAILY PRN
Status: DISCONTINUED | OUTPATIENT
Start: 2025-04-10 | End: 2025-04-10 | Stop reason: HOSPADM

## 2025-04-10 RX ORDER — GABAPENTIN 100 MG/1
100 CAPSULE ORAL 3 TIMES DAILY PRN
Status: DISCONTINUED | OUTPATIENT
Start: 2025-04-10 | End: 2025-04-10 | Stop reason: HOSPADM

## 2025-04-10 RX ADMIN — LOSARTAN POTASSIUM 50 MG: 50 TABLET, FILM COATED ORAL at 09:00

## 2025-04-10 RX ADMIN — ASPIRIN 81 MG: 81 TABLET, COATED ORAL at 08:59

## 2025-04-10 RX ADMIN — CEFAZOLIN 2000 MG: 10 INJECTION, POWDER, FOR SOLUTION INTRAVENOUS at 01:48

## 2025-04-10 RX ADMIN — SENNOSIDES, DOCUSATE SODIUM 1 TABLET: 50; 8.6 TABLET, FILM COATED ORAL at 08:59

## 2025-04-10 RX ADMIN — ACETAMINOPHEN 1000 MG: 500 TABLET, FILM COATED ORAL at 05:24

## 2025-04-10 RX ADMIN — BUSPIRONE HYDROCHLORIDE 15 MG: 5 TABLET ORAL at 01:44

## 2025-04-10 RX ADMIN — CITALOPRAM HYDROBROMIDE 20 MG: 20 TABLET ORAL at 01:44

## 2025-04-10 RX ADMIN — Medication 3 MG: at 01:47

## 2025-04-10 RX ADMIN — BUSPIRONE HYDROCHLORIDE 15 MG: 5 TABLET ORAL at 09:00

## 2025-04-10 NOTE — PROGRESS NOTES
04/09/25 1836   Treatment   Treatment Type IS   Oxygen Therapy/Pulse Ox   O2 Therapy Oxygen   $Oxygen $Daily Charge   O2 Device Nasal cannula   O2 Flow Rate (L/min) 1 L/min   Pulse 78   Respirations 16   SpO2 95 %   $Pulse Oximeter $Spot check (single)       
ACUTE PHYSICAL THERAPY GOALS:   (Developed with and agreed upon by patient and/or caregiver.)  GOALS (1-4 days):  (1.)Ms. Gomez will move from supine to sit and sit to supine  in bed with INDEPENDENT.  (2.)Ms. Gomez will transfer from bed to chair and chair to bed with SUPERVISION using the least restrictive device.  (3.)Ms. Gomez will ambulate with SUPERVISION for 200 feet with the least restrictive device.  (4.)Ms. Gomez will ambulate up/down 2 steps with right railing with STAND BY ASSIST.  (5.)Ms. Gomez will increase left knee ROM to 0-90°.  ________________________________________________________________________________________________            PHYSICAL THERAPY: TOTAL KNEE ARTHROPLASTY Initial Assessment  (Link to Caseload Tracking: PT Visit Days : 1  Acknowledge Orders  Time In/Out  PT Charge Capture  Rehab Caseload Tracker  Episode   Jenn Gomez is a 79 y.o. female   PRIMARY DIAGNOSIS: Status post left knee replacement  Primary osteoarthritis of left knee [M17.12]  Procedure(s) (LRB):  lt KNEE TOTAL ARTHROPLASTY ROBOTIC SDD (Left)  * Day of Surgery *  Reason for Referral: Pain in Left Knee (M25.562)  Stiffness of Left Knee, Not elsewhere classified (M25.662)  Difficulty in walking, Not elsewhere classified (R26.2)  Outpatient in a bed: Payor: MEDICARE / Plan: MEDICARE PART A AND B / Product Type: *No Product type* /     REHAB RECOMMENDATIONS:   Recommendation to date pending progress:  Setting:  Home Health Therapy    Equipment:    None     RANGE OF MOTION:   Left Knee Flexion:    Left Knee Extension:       GAIT: I Mod I S SBA CGA Min Mod Max Total  NT x2 Comments:   Level of Assistance [] [] [] [x] [x] [] [] [] [] [] []            Weightbearing Status  Left Lower Extremity Weight Bearing: Weight Bearing As Tolerated    Distance  120 feet    Gait Quality Decreased sai , Decreased step length, and Decreased stance    DME Gait Belt and Rolling Walker     Stairs Stairs/Curb  Stairs?: 
Had therapy. Has medications for home already. Home therapy arranged. Given extra aquacel for home. Has follow up appt scheduled. Discharge instructions given. Going to car via wheelchair.   
TRANSFER - IN REPORT:    Verbal report received from Minnie Rodriguez RN on Jenn Gomez  being received from PACU for routine post-op      Report consisted of patient's Situation, Background, Assessment and   Recommendations(SBAR).     Information from the following report(s) Nurse Handoff Report was reviewed with the receiving nurse.    Opportunity for questions and clarification was provided.      Assessment completed upon patient's arrival to unit and care assumed.   Oriented to room and bed controls. L knee with aquacel and iceman. Yellow gripper socks to feet. Family member at bedside.   
ondansetron (ZOFRAN-ODT) disintegrating tablet 4 mg  4 mg Oral Q8H PRN    Or    ondansetron (ZOFRAN) injection 4 mg  4 mg IntraVENous Q6H PRN    sennosides-docusate sodium (SENOKOT-S) 8.6-50 MG tablet 1 tablet  1 tablet Oral BID    aspirin EC tablet 81 mg  81 mg Oral BID    diphenhydrAMINE (BENADRYL) capsule 25 mg  25 mg Oral Q6H PRN    Or    diphenhydrAMINE (BENADRYL) injection 25 mg  25 mg IntraVENous Q6H PRN        LAB:    Lab Results   Component Value Date/Time    INR 1.1 03/18/2025 09:06 AM     Lab Results   Component Value Date/Time    HGB 11.5 04/10/2025 04:11 AM    HGB 13.0 03/18/2025 09:06 AM    HGB 14.6 03/05/2025 12:28 PM              Physical Exam:  Vital Signs are Stable, No Acute Distress, Alert,  Dressing is Dry,  Neurovascular exam is normal.      Assessment:      Principal Problem:    Status post left knee replacement  Active Problems:    Osteoarthritis    Primary osteoarthritis of left knee  Resolved Problems:    * No resolved hospital problems. *       Plan:     Patient looks very good.   Continue PT/OT/Rehab  Anticipated discharge home today if stable.  Prescriptions e-prescribed to pharmacy.    I have reviewed the patient’s controlled substance prescription history, as maintained in the [place] prescription monitoring program, so that the prescription(s) for a  controlled substance can be given.                  
hospital stay or until stated goals are met, whichever comes first.    ACUTE OCCUPATIONAL THERAPY GOALS:   (Developed with and agreed upon by patient and/or caregiver.)    GOALS:   DISCHARGE GOALS (in preparation for going home/rehab):  3 days  1.  Ms. Gomez will perform lower body dressing activity with minimal assist required to demonstrate improved functional mobility and safety.   -GOAL MET 4/9/25     2.  Ms. Gomez will perform bathing activity with minimal assist required to demonstrate improved functional mobility and safety.  3.  Ms. Gomez will perform toileting activity with  contact guard assist to demonstrate improved functional mobility and safety.  4.  Ms. Gomez will perform all functional transfers transfer with contact guard assist to demonstrate improved functional mobility and safety. PROGRESSING 4/9/25       PROBLEM LIST:   (Skilled intervention is medically necessary to address:)  Decreased ADL/Functional Activities  Decreased Activity Tolerance  Decreased AROM/PROM  Decreased Balance  Decreased Coordination  Decreased Gait Ability  Decreased Strength  Decreased Transfer Abilities  Decreased Sensation  Increased Pain   INTERVENTIONS PLANNED:   (Benefits and precautions of occupational therapy have been discussed with the patient.)  Self Care Training  Therapeutic Activity  Education         TREATMENT:     EVALUATION: LOW COMPLEXITY: (Untimed Charge)    TREATMENT:   SELF CARE: (25 minutes):   Procedure(s) (per grid) utilized to improve and/or restore self-care/home management as related to dressing, bathing, toileting, grooming, self feeding, and functional mobility  . Required minimal visual, verbal, manual, and tactile cueing to facilitate activities of daily living skills, compensatory activities, and OT educated Patient and spouse on OT plan of care, discharge planning, adl task performance, post op showering, resting joint position, ice machine, walker use and home safety.  .      AFTER 
minimal visual, verbal, manual, and tactile cueing to facilitate activities of daily living skills, compensatory activities, and OT educated Patient and spouse on OT plan of care, discharge planning, adl task performance, post op showering, resting joint position, ice machine, walker use and home safety.  .      AFTER TREATMENT PRECAUTIONS: Bed/Chair Locked, Call light within reach, Chair, Needs within reach, RN notified, and Visitors at bedside    INTERDISCIPLINARY COLLABORATION:  RN/ PCT and OT/ MEEK    Interdisciplinary Patient Education  (Reference education tab)    [x] Safe And Effective Hygiene  [x] Fall Precautions  [x] Precautions  [x] D/C Instruction Review [x] Self Care Training and Home Safety  [x] Walker Management/Safety  [x] Adaptive Equipment as Needed  [x] Therapeutic Resting Position of Joint     TOTAL TREATMENT DURATION AND TIME:  Time In: 0815  Time Out: 0900  Minutes: 45    Ebonie Cohen, OT             
patient and/or family/caregiver on the following: Cryocuff/Icepacks, D/C Instruction Review, Fall Precautions, Home Exercises, No Pillow Under Knee (TKA), and Walker Management/Safety    Interdisciplinary Patient Education   (Reference education tab)    AFTER TREATMENT PRECAUTIONS: Bed/Chair Locked, Call light within reach, Chair, Needs within reach, RN notified, and Visitors at bedside    INTERDISCIPLINARY COLLABORATION:  RN/ PCT    COMPLIANCE WITH PROGRAM/EXERCISE: Will assess as treatment progresses.    RECOMMENDATIONS/INTENT FOR NEXT TREATMENT SESSION: Treatment next visit will focus on increasing Ms. Jason's independence with bed mobility, transfers, gait training, strength/ROM exercises, modalities for pain, and patient education.     TIME IN/OUT:  Time In: 0925  Time Out: 0953  Minutes: 28    Roseanne Galeano, PT

## 2025-04-14 ENCOUNTER — TRANSCRIBE ORDERS (OUTPATIENT)
Dept: SCHEDULING | Age: 80
End: 2025-04-14

## 2025-04-14 DIAGNOSIS — Z12.31 VISIT FOR SCREENING MAMMOGRAM: Primary | ICD-10-CM

## 2025-04-22 ENCOUNTER — TELEMEDICINE (OUTPATIENT)
Dept: FAMILY MEDICINE CLINIC | Facility: CLINIC | Age: 80
End: 2025-04-22
Payer: MEDICARE

## 2025-04-22 DIAGNOSIS — E11.9 TYPE 2 DIABETES MELLITUS WITHOUT COMPLICATION, WITHOUT LONG-TERM CURRENT USE OF INSULIN (HCC): Primary | ICD-10-CM

## 2025-04-22 DIAGNOSIS — E78.00 PURE HYPERCHOLESTEROLEMIA: ICD-10-CM

## 2025-04-22 DIAGNOSIS — I10 PRIMARY HYPERTENSION: ICD-10-CM

## 2025-04-22 DIAGNOSIS — E78.1 PURE HYPERGLYCERIDEMIA: ICD-10-CM

## 2025-04-22 DIAGNOSIS — E55.9 VITAMIN D DEFICIENCY: ICD-10-CM

## 2025-04-22 DIAGNOSIS — M19.90 OSTEOARTHRITIS, UNSPECIFIED OSTEOARTHRITIS TYPE, UNSPECIFIED SITE: ICD-10-CM

## 2025-04-22 DIAGNOSIS — F33.9 RECURRENT DEPRESSION: ICD-10-CM

## 2025-04-22 PROCEDURE — 1159F MED LIST DOCD IN RCRD: CPT | Performed by: FAMILY MEDICINE

## 2025-04-22 PROCEDURE — 1036F TOBACCO NON-USER: CPT | Performed by: FAMILY MEDICINE

## 2025-04-22 PROCEDURE — 1123F ACP DISCUSS/DSCN MKR DOCD: CPT | Performed by: FAMILY MEDICINE

## 2025-04-22 PROCEDURE — G8427 DOCREV CUR MEDS BY ELIG CLIN: HCPCS | Performed by: FAMILY MEDICINE

## 2025-04-22 PROCEDURE — G8417 CALC BMI ABV UP PARAM F/U: HCPCS | Performed by: FAMILY MEDICINE

## 2025-04-22 PROCEDURE — G8399 PT W/DXA RESULTS DOCUMENT: HCPCS | Performed by: FAMILY MEDICINE

## 2025-04-22 PROCEDURE — 99213 OFFICE O/P EST LOW 20 MIN: CPT | Performed by: FAMILY MEDICINE

## 2025-04-22 PROCEDURE — 3044F HG A1C LEVEL LT 7.0%: CPT | Performed by: FAMILY MEDICINE

## 2025-04-22 PROCEDURE — 1090F PRES/ABSN URINE INCON ASSESS: CPT | Performed by: FAMILY MEDICINE

## 2025-04-22 ASSESSMENT — PATIENT HEALTH QUESTIONNAIRE - PHQ9
2. FEELING DOWN, DEPRESSED OR HOPELESS: NOT AT ALL
SUM OF ALL RESPONSES TO PHQ QUESTIONS 1-9: 0
1. LITTLE INTEREST OR PLEASURE IN DOING THINGS: NOT AT ALL
SUM OF ALL RESPONSES TO PHQ QUESTIONS 1-9: 0

## 2025-04-22 NOTE — PROGRESS NOTES
SUBJECTIVE:   Jenn Gomez is a 79 y.o. female who has a past medical history significant for hypertension, diabetes, high cholesterol, high triglycerides, vitamin D deficiency, anxiety, allergic rhinitis and recurrent depression.  Who presents today for virtual visit via telephone encounter.  [employer] messaging is deferred secondary to technical challenges. Review of systems reveals no complaints of chest pain, shortness of breath, orthopnea or PND.  GI and  review of systems is unremarkable.  Current medications are listed in the EMR and reviewed today.    Patient reports that 2 weeks ago she underwent knee replacement.  She is recuperating well and has no complaints today.    Patient's vital signs were not performed as encounter was performed virtually.  Location of virtual visit was patient's home.      HPI  See above    Past Medical History, Past Surgical History, Family history, Social History, and Medications were all reviewed with the patient today and updated as necessary.       Current Outpatient Medications   Medication Sig Dispense Refill    acetaminophen (TYLENOL) 500 MG tablet Take 2 tablets by mouth every 6 hours as needed for Pain      aspirin (ASPIRIN 81) 81 MG EC tablet Take 1 tablet by mouth in the morning and at bedtime 70 tablet 0    celecoxib (CELEBREX) 200 MG capsule Take 1 capsule by mouth 2 times daily 60 capsule 0    promethazine (PHENERGAN) 12.5 MG tablet Take 1 tablet by mouth 4 times daily as needed for Nausea 20 tablet 2    methocarbamol (ROBAXIN-750) 750 MG tablet Take 1 tablet by mouth 3 times daily as needed (muscle spasm or cramps) 40 tablet 1    olmesartan (BENICAR) 20 MG tablet Take 1 tablet by mouth daily 90 tablet 3    atorvastatin (LIPITOR) 10 MG tablet Take 1 tablet by mouth daily 90 tablet 3    busPIRone (BUSPAR) 15 MG tablet Take 15 mg by mouth 2 times daily 180 tablet 3    citalopram (CELEXA) 20 MG tablet Take 1 tablet by mouth daily 90 tablet 3    fenofibrate

## 2025-04-22 NOTE — PROGRESS NOTES
\"Have you been to the ER, urgent care clinic since your last visit?  Hospitalized since your last visit?\"    YES - When: approximately 2  weeks ago.  Where and Why: [facility] knee replacement.    “Have you seen or consulted any other health care providers outside our system since your last visit?”    NO

## 2025-05-10 DIAGNOSIS — M17.12 PRIMARY OSTEOARTHRITIS OF LEFT KNEE: ICD-10-CM

## 2025-05-12 RX ORDER — CELECOXIB 200 MG/1
200 CAPSULE ORAL 2 TIMES DAILY
Qty: 60 CAPSULE | Refills: 0 | OUTPATIENT
Start: 2025-05-12

## 2025-05-13 ENCOUNTER — OFFICE VISIT (OUTPATIENT)
Dept: ORTHOPEDIC SURGERY | Age: 80
End: 2025-05-13

## 2025-05-13 DIAGNOSIS — Z96.652 STATUS POST LEFT KNEE REPLACEMENT: Primary | ICD-10-CM

## 2025-05-13 NOTE — PROGRESS NOTES
Post-op TKA Visit      05/13/25     No Known Allergies  Current Outpatient Medications on File Prior to Visit   Medication Sig Dispense Refill    acetaminophen (TYLENOL) 500 MG tablet Take 2 tablets by mouth every 6 hours as needed for Pain      aspirin (ASPIRIN 81) 81 MG EC tablet Take 1 tablet by mouth in the morning and at bedtime 70 tablet 0    celecoxib (CELEBREX) 200 MG capsule Take 1 capsule by mouth 2 times daily 60 capsule 0    promethazine (PHENERGAN) 12.5 MG tablet Take 1 tablet by mouth 4 times daily as needed for Nausea 20 tablet 2    methocarbamol (ROBAXIN-750) 750 MG tablet Take 1 tablet by mouth 3 times daily as needed (muscle spasm or cramps) 40 tablet 1    olmesartan (BENICAR) 20 MG tablet Take 1 tablet by mouth daily 90 tablet 3    atorvastatin (LIPITOR) 10 MG tablet Take 1 tablet by mouth daily 90 tablet 3    busPIRone (BUSPAR) 15 MG tablet Take 15 mg by mouth 2 times daily 180 tablet 3    citalopram (CELEXA) 20 MG tablet Take 1 tablet by mouth daily 90 tablet 3    fenofibrate 160 MG tablet Take 1 tablet by mouth daily 90 tablet 3    Cholecalciferol 50 MCG (2000 UT) TABS Take 1 tablet by mouth at bedtime       No current facility-administered medications on file prior to visit.        5 weeks Status Post left TKA     History: The patient returns today for post-op visit following left TKA.   Their pain is improving. They are ambulating with a cane as a  walking aid.  She is completing her physical therapy at home.  They are pleased with their results thus far. Denies any new complaints today.     Physical Exam:  The patient's incision is well healed. There is moderate swelling and moderate increased warmth. ROM is 0 to 116 degrees. There is no M/L or A/P instability. The calf is soft and non-tender. Neurologic and vascular exams are intact.    X-Rays: AP, Lateral, and sunrise views of the left knee reveals a cementless TKA, [place] prosthesis. There is a cemented patella arthroplasty. There is

## 2025-06-23 ENCOUNTER — HOSPITAL ENCOUNTER (OUTPATIENT)
Dept: MAMMOGRAPHY | Age: 80
Discharge: HOME | End: 2025-06-26
Attending: FAMILY MEDICINE
Payer: MEDICARE

## 2025-06-23 VITALS — WEIGHT: 185 LBS | BODY MASS INDEX: 37.29 KG/M2 | HEIGHT: 59 IN

## 2025-06-23 DIAGNOSIS — Z12.31 VISIT FOR SCREENING MAMMOGRAM: ICD-10-CM

## 2025-06-23 PROCEDURE — 77063 BREAST TOMOSYNTHESIS BI: CPT

## 2025-07-21 ENCOUNTER — APPOINTMENT (OUTPATIENT)
Dept: URBAN - METROPOLITAN AREA CLINIC 329 | Facility: CLINIC | Age: 80
Setting detail: DERMATOLOGY
End: 2025-07-21

## 2025-07-21 DIAGNOSIS — D22 MELANOCYTIC NEVI: ICD-10-CM

## 2025-07-21 DIAGNOSIS — Z12.83 ENCOUNTER FOR SCREENING FOR MALIGNANT NEOPLASM OF SKIN: ICD-10-CM

## 2025-07-21 DIAGNOSIS — L82.1 OTHER SEBORRHEIC KERATOSIS: ICD-10-CM

## 2025-07-21 DIAGNOSIS — L57.0 ACTINIC KERATOSIS: ICD-10-CM

## 2025-07-21 DIAGNOSIS — L81.4 OTHER MELANIN HYPERPIGMENTATION: ICD-10-CM

## 2025-07-21 DIAGNOSIS — D18.0 HEMANGIOMA: ICD-10-CM

## 2025-07-21 DIAGNOSIS — Z85.828 PERSONAL HISTORY OF OTHER MALIGNANT NEOPLASM OF SKIN: ICD-10-CM

## 2025-07-21 DIAGNOSIS — L57.8 OTHER SKIN CHANGES DUE TO CHRONIC EXPOSURE TO NONIONIZING RADIATION: ICD-10-CM

## 2025-07-21 PROBLEM — D18.01 HEMANGIOMA OF SKIN AND SUBCUTANEOUS TISSUE: Status: ACTIVE | Noted: 2025-07-21

## 2025-07-21 PROBLEM — D22.61 MELANOCYTIC NEVI OF RIGHT UPPER LIMB, INCLUDING SHOULDER: Status: ACTIVE | Noted: 2025-07-21

## 2025-07-21 PROBLEM — D22.5 MELANOCYTIC NEVI OF TRUNK: Status: ACTIVE | Noted: 2025-07-21

## 2025-07-21 PROBLEM — D22.72 MELANOCYTIC NEVI OF LEFT LOWER LIMB, INCLUDING HIP: Status: ACTIVE | Noted: 2025-07-21

## 2025-07-21 PROBLEM — D22.71 MELANOCYTIC NEVI OF RIGHT LOWER LIMB, INCLUDING HIP: Status: ACTIVE | Noted: 2025-07-21

## 2025-07-21 PROCEDURE — ? TREATMENT REGIMEN

## 2025-07-21 PROCEDURE — ? LIQUID NITROGEN

## 2025-07-21 PROCEDURE — ? COUNSELING

## 2025-07-21 PROCEDURE — ? FULL BODY SKIN EXAM

## 2025-07-21 ASSESSMENT — LOCATION DETAILED DESCRIPTION DERM
LOCATION DETAILED: LEFT CENTRAL MALAR CHEEK
LOCATION DETAILED: LEFT PROXIMAL POSTERIOR THIGH
LOCATION DETAILED: LEFT DISTAL POSTERIOR THIGH
LOCATION DETAILED: LEFT DISTAL DORSAL FOREARM
LOCATION DETAILED: UPPER STERNUM
LOCATION DETAILED: RIGHT ANTERIOR DISTAL THIGH
LOCATION DETAILED: LEFT DORSAL MIDDLE METACARPOPHALANGEAL JOINT
LOCATION DETAILED: RIGHT VENTRAL DISTAL FOREARM
LOCATION DETAILED: LEFT DISTAL DORSAL FOREARM
LOCATION DETAILED: RIGHT CENTRAL EYEBROW
LOCATION DETAILED: SUPERIOR THORACIC SPINE
LOCATION DETAILED: MID-FRONTAL SCALP
LOCATION DETAILED: RIGHT SUPERIOR MEDIAL UPPER BACK
LOCATION DETAILED: RIGHT ANTERIOR DISTAL UPPER ARM
LOCATION DETAILED: LEFT ANTERIOR DISTAL UPPER ARM
LOCATION DETAILED: LEFT PROXIMAL DORSAL FOREARM
LOCATION DETAILED: RIGHT PROXIMAL DORSAL FOREARM
LOCATION DETAILED: RIGHT LATERAL EYEBROW
LOCATION DETAILED: RIGHT CENTRAL EYEBROW
LOCATION DETAILED: LEFT DISTAL CALF
LOCATION DETAILED: LEFT RADIAL DORSAL HAND
LOCATION DETAILED: EPIGASTRIC SKIN
LOCATION DETAILED: RIGHT CLAVICULAR SKIN
LOCATION DETAILED: LEFT INFERIOR UPPER BACK

## 2025-07-21 ASSESSMENT — LOCATION SIMPLE DESCRIPTION DERM
LOCATION SIMPLE: RIGHT CLAVICULAR SKIN
LOCATION SIMPLE: ANTERIOR SCALP
LOCATION SIMPLE: LEFT UPPER ARM
LOCATION SIMPLE: RIGHT UPPER BACK
LOCATION SIMPLE: CHEST
LOCATION SIMPLE: LEFT FOREARM
LOCATION SIMPLE: RIGHT THIGH
LOCATION SIMPLE: RIGHT UPPER ARM
LOCATION SIMPLE: UPPER BACK
LOCATION SIMPLE: LEFT FOREARM
LOCATION SIMPLE: RIGHT EYEBROW
LOCATION SIMPLE: ABDOMEN
LOCATION SIMPLE: LEFT HAND
LOCATION SIMPLE: LEFT UPPER BACK
LOCATION SIMPLE: RIGHT FOREARM
LOCATION SIMPLE: LEFT CHEEK
LOCATION SIMPLE: LEFT CALF
LOCATION SIMPLE: LEFT POSTERIOR THIGH
LOCATION SIMPLE: RIGHT EYEBROW
LOCATION SIMPLE: LEFT HAND

## 2025-07-21 ASSESSMENT — LOCATION ZONE DERM
LOCATION ZONE: FACE
LOCATION ZONE: ARM
LOCATION ZONE: ARM
LOCATION ZONE: TRUNK
LOCATION ZONE: HAND
LOCATION ZONE: FACE
LOCATION ZONE: LEG
LOCATION ZONE: SCALP
LOCATION ZONE: HAND

## 2025-07-21 NOTE — PROCEDURE: LIQUID NITROGEN
-continue home oxygen at this time at 4 L resting with 8 L with activity.  Patient's oxygen saturation decreased to 87 88% while in office given that his oxygen was empty.  A new tank was provided the patient and patient's oxygen saturation went back up to 91%  -strongly advised patient maintain his appointment with pulmonology later this month.  Patient also to call to schedule biopsy.  
Consent: The patient's consent was obtained including but not limited to risks of crusting, scabbing, blistering, scarring, darker or lighter pigmentary change, recurrence, incomplete removal and infection.
Render Post-Care Instructions In Note?: no
Detail Level: Detailed
Show Applicator Variable?: Yes
Post-Care Instructions: I reviewed with the patient in detail post-care instructions. Patient is to wear sunprotection, and avoid picking at any of the treated lesions. Pt may apply Vaseline to crusted or scabbing areas.
Duration Of Freeze Thaw-Cycle (Seconds): 0

## (undated) DEVICE — KIT PREP FEM CRUCE SZ 4

## (undated) DEVICE — BLADE RMR L41MM PAT PILOT H

## (undated) DEVICE — SOLUTION IRRIG 1000ML STRL H2O USP PLAS POUR BTL

## (undated) DEVICE — HANDPIECE SET WITH COAXIAL HIGH FLOW TIP AND SUCTION TUBE: Brand: INTERPULSE

## (undated) DEVICE — SOLUTION IV DEXTROSE/SALINE 5%-0.9% 500ML - 500ML

## (undated) DEVICE — SYRINGE MED 30ML STD CLR PLAS LUERLOCK TIP N CTRL DISP

## (undated) DEVICE — TRAY CATH 16F DRN BG LTX -- CONVERT TO ITEM 363158

## (undated) DEVICE — SUT ETHBND 2 30IN LR DA GRN --

## (undated) DEVICE — SYRINGE CATH TIP 50ML

## (undated) DEVICE — Z DISCONTINUED USE 2744636  DRESSING AQUACEL 14 IN ALG W3.5XL14IN POLYUR FLM CVR W/ HYDRCOLL

## (undated) DEVICE — SUTURE MONOCRYL STRATAFIX SPRL + SZ 2-0 L18IN ABSRB UD CT-1 SXMP1B413

## (undated) DEVICE — HOOD: Brand: T7PLUS

## (undated) DEVICE — T4 HOOD

## (undated) DEVICE — GLOVE SURG SZ 65 THK91MIL LTX FREE SYN POLYISOPRENE

## (undated) DEVICE — SUTURE PDS II SZ 1 L96IN ABSRB VLT TP-1 L65MM 1/2 CIR Z880G

## (undated) DEVICE — 2000CC GUARDIAN II: Brand: GUARDIAN

## (undated) DEVICE — X-LARGE COTTON GLOVE: Brand: DEROYAL

## (undated) DEVICE — BIPOLAR SEALER 23-112-1 AQM 6.0: Brand: AQUAMANTYS ®

## (undated) DEVICE — SUTURE VCRL SZ 1 L27IN ABSRB UD L36MM CP-1 1/2 CIR REV CUT J268H

## (undated) DEVICE — BLADE SAW PAT RMR PILT H 41MM --

## (undated) DEVICE — DRAPE TWL SURG 16X26IN BLU ORB04] ALLCARE INC]

## (undated) DEVICE — SOLUTION IV 250ML 0.9% SOD CHL PH 5 INJ USP VIAFLX PLAS

## (undated) DEVICE — SUTURE ETHIBOND EXCEL SZ 2 L30IN NONABSORBABLE GRN L40MM V-37 MX69G

## (undated) DEVICE — PACK PROCEDURE SURG TOT KNEE

## (undated) DEVICE — SUTURE STRATAFIX SYMMETRIC PDS + SZ 2-0 L18IN ABSRB VLT SXPP1A403

## (undated) DEVICE — SLIM BODY SKIN STAPLER: Brand: APPOSE ULC

## (undated) DEVICE — SYR 50ML LR LCK 1ML GRAD NSAF --

## (undated) DEVICE — Device

## (undated) DEVICE — SYR 10ML LUER LOK 1/5ML GRAD --

## (undated) DEVICE — SUTURE ABS ANTIBACT 1-0 CTX 24IN STRATAFIX PDS+ SXPP1A445

## (undated) DEVICE — KIT INT FIX FEM TIB CKPT MAKOPLASTY

## (undated) DEVICE — PIN BNE FIX TEMP L140MM DIA4MM MAKO

## (undated) DEVICE — SOLUTION IRRIG 3000ML 0.9% SOD CHL USP UROMATIC PLAS CONT

## (undated) DEVICE — STERILE PRESSURE PROTECTOR PAD® FOR DE MAYO UNIVERSAL DISTRACTOR® (10/CASE): Brand: DE MAYO UNIVERSAL DISTRACTOR®

## (undated) DEVICE — GLOVE SURG SZ 7 L11.33IN FNGR THK9.8MIL STRW LTX POLYMER

## (undated) DEVICE — TRAY PREP DRY W/ PREM GLV 2 APPL 6 SPNG 2 UNDPD 1 OVERWRAP

## (undated) DEVICE — MEDI-VAC YANKAUER SUCTION HANDLE W/BULBOUS TIP: Brand: CARDINAL HEALTH

## (undated) DEVICE — SYR LR LCK 1ML GRAD NSAF 30ML --

## (undated) DEVICE — KIT DRP FOR RIO ROBOTIC ARM ASST SYS

## (undated) DEVICE — GLOVE ORANGE PI 8   MSG9080

## (undated) DEVICE — SOLUTION IRRIG 1000ML 0.9% SOD CHL USP POUR PLAS BTL

## (undated) DEVICE — 3M™ STERI-DRAPE™ INSTRUMENT POUCH 1018: Brand: STERI-DRAPE™

## (undated) DEVICE — SOLUTION IV 1000ML 0.9% SOD CHL

## (undated) DEVICE — TOTAL KNEE: Brand: MEDLINE INDUSTRIES, INC.

## (undated) DEVICE — (D)PREP SKN CHLRAPRP APPL 26ML -- CONVERT TO ITEM 371833

## (undated) DEVICE — 3M™ STERI-DRAPE™ INCISE DRAPE 1050 (60CM X 45CM): Brand: STERI-DRAPE™

## (undated) DEVICE — SOLUTION WND IRRIGATION 450 ML 0.5 PVP-I 0.9 NACL

## (undated) DEVICE — SUTURE ABSRB X-1 REV CUT 1/2 CIR 22MM UD BRAID 27IN SZ 3-0 J458H

## (undated) DEVICE — BANDAGE COMPR SELF ADH 5 YDX4 IN TAN STRL PREMIERPRO LF

## (undated) DEVICE — DRAPE,TOP,102X53,STERILE: Brand: MEDLINE

## (undated) DEVICE — GLOVE SURG SZ 75 L12IN FNGR THK79MIL GRN LTX FREE

## (undated) DEVICE — SUTURE VICRYL SZ 1 L36IN ABSRB UD L36MM CT-1 1/2 CIR J947H

## (undated) DEVICE — OSCILLATING TIP SAW CARTRIDGE: Brand: STRYKER PRECISION

## (undated) DEVICE — PIN BNE FIX TEMP L110MM DIA4MM MAKO

## (undated) DEVICE — 3000CC GUARDIAN II: Brand: GUARDIAN

## (undated) DEVICE — BUTTON SWITCH PENCIL BLADE ELECTRODE, HOLSTER: Brand: EDGE

## (undated) DEVICE — REM POLYHESIVE ADULT PATIENT RETURN ELECTRODE: Brand: VALLEYLAB

## (undated) DEVICE — UTILITY MARKER,BLACK WITH LABELS: Brand: DEVON

## (undated) DEVICE — KIT TRK KNEE PROC VIZADISC

## (undated) DEVICE — GLOVE SURG SZ 8 L12IN FNGR THK79MIL GRN LTX FREE

## (undated) DEVICE — GLOVE SURG SZ 7 CRM LTX FREE POLYISOPRENE POLYMER BEAD ANTI

## (undated) DEVICE — SOLUTION IRRIG 3000ML 0.9% SOD CHL FLX CONT 0797208] ICU MEDICAL INC]